# Patient Record
Sex: FEMALE | Race: BLACK OR AFRICAN AMERICAN | NOT HISPANIC OR LATINO | Employment: OTHER | URBAN - METROPOLITAN AREA
[De-identification: names, ages, dates, MRNs, and addresses within clinical notes are randomized per-mention and may not be internally consistent; named-entity substitution may affect disease eponyms.]

---

## 2023-02-23 LAB
ALANINE AMINOTRANSFERASE (SGPT) (U/L) IN SER/PLAS: 8 U/L (ref 7–45)
ALBUMIN (G/DL) IN SER/PLAS: 4 G/DL (ref 3.4–5)
ALKALINE PHOSPHATASE (U/L) IN SER/PLAS: 53 U/L (ref 33–136)
ANION GAP IN SER/PLAS: 14 MMOL/L (ref 10–20)
ASPARTATE AMINOTRANSFERASE (SGOT) (U/L) IN SER/PLAS: 11 U/L (ref 9–39)
BILIRUBIN TOTAL (MG/DL) IN SER/PLAS: 0.5 MG/DL (ref 0–1.2)
CALCIUM (MG/DL) IN SER/PLAS: 9.5 MG/DL (ref 8.6–10.6)
CARBON DIOXIDE, TOTAL (MMOL/L) IN SER/PLAS: 26 MMOL/L (ref 21–32)
CHLORIDE (MMOL/L) IN SER/PLAS: 105 MMOL/L (ref 98–107)
CREATININE (MG/DL) IN SER/PLAS: 0.97 MG/DL (ref 0.5–1.05)
ERYTHROCYTE DISTRIBUTION WIDTH (RATIO) BY AUTOMATED COUNT: 17.2 % (ref 11.5–14.5)
ERYTHROCYTE MEAN CORPUSCULAR HEMOGLOBIN CONCENTRATION (G/DL) BY AUTOMATED: 30.5 G/DL (ref 32–36)
ERYTHROCYTE MEAN CORPUSCULAR VOLUME (FL) BY AUTOMATED COUNT: 95 FL (ref 80–100)
ERYTHROCYTES (10*6/UL) IN BLOOD BY AUTOMATED COUNT: 3.13 X10E12/L (ref 4–5.2)
GFR FEMALE: 57 ML/MIN/1.73M2
GLUCOSE (MG/DL) IN SER/PLAS: 110 MG/DL (ref 74–99)
HEMATOCRIT (%) IN BLOOD BY AUTOMATED COUNT: 29.8 % (ref 36–46)
HEMOGLOBIN (G/DL) IN BLOOD: 9.1 G/DL (ref 12–16)
LEUKOCYTES (10*3/UL) IN BLOOD BY AUTOMATED COUNT: 5.1 X10E9/L (ref 4.4–11.3)
LIPASE (U/L) IN SER/PLAS: 6 U/L (ref 9–82)
NRBC (PER 100 WBCS) BY AUTOMATED COUNT: 0 /100 WBC (ref 0–0)
PLATELETS (10*3/UL) IN BLOOD AUTOMATED COUNT: 218 X10E9/L (ref 150–450)
POTASSIUM (MMOL/L) IN SER/PLAS: 4 MMOL/L (ref 3.5–5.3)
PROTEIN TOTAL: 6.3 G/DL (ref 6.4–8.2)
SODIUM (MMOL/L) IN SER/PLAS: 141 MMOL/L (ref 136–145)
UREA NITROGEN (MG/DL) IN SER/PLAS: 16 MG/DL (ref 6–23)

## 2023-04-20 ENCOUNTER — TELEPHONE (OUTPATIENT)
Dept: PRIMARY CARE | Facility: CLINIC | Age: 88
End: 2023-04-20
Payer: MEDICARE

## 2023-04-24 ENCOUNTER — OFFICE VISIT (OUTPATIENT)
Dept: PRIMARY CARE | Facility: CLINIC | Age: 88
End: 2023-04-24
Payer: MEDICARE

## 2023-04-24 VITALS
HEART RATE: 67 BPM | WEIGHT: 180 LBS | DIASTOLIC BLOOD PRESSURE: 70 MMHG | TEMPERATURE: 97.2 F | BODY MASS INDEX: 28.19 KG/M2 | SYSTOLIC BLOOD PRESSURE: 162 MMHG

## 2023-04-24 DIAGNOSIS — D64.9 ANEMIA, UNSPECIFIED TYPE: ICD-10-CM

## 2023-04-24 DIAGNOSIS — I34.0 MITRAL VALVE INSUFFICIENCY, UNSPECIFIED ETIOLOGY: ICD-10-CM

## 2023-04-24 DIAGNOSIS — R06.02 SOB (SHORTNESS OF BREATH): ICD-10-CM

## 2023-04-24 DIAGNOSIS — R60.0 LEG EDEMA, LEFT: ICD-10-CM

## 2023-04-24 DIAGNOSIS — I10 ESSENTIAL (PRIMARY) HYPERTENSION: ICD-10-CM

## 2023-04-24 DIAGNOSIS — M25.572 ACUTE LEFT ANKLE PAIN: ICD-10-CM

## 2023-04-24 DIAGNOSIS — J81.0 FLASH PULMONARY EDEMA (MULTI): ICD-10-CM

## 2023-04-24 DIAGNOSIS — N17.9 ACUTE KIDNEY INJURY (CMS-HCC): ICD-10-CM

## 2023-04-24 DIAGNOSIS — I35.0 AORTIC VALVE STENOSIS, ETIOLOGY OF CARDIAC VALVE DISEASE UNSPECIFIED: ICD-10-CM

## 2023-04-24 DIAGNOSIS — Z12.11 SCREEN FOR COLON CANCER: Primary | ICD-10-CM

## 2023-04-24 DIAGNOSIS — I07.1 TRICUSPID VALVE INSUFFICIENCY, UNSPECIFIED ETIOLOGY: ICD-10-CM

## 2023-04-24 PROCEDURE — 3077F SYST BP >= 140 MM HG: CPT | Performed by: FAMILY MEDICINE

## 2023-04-24 PROCEDURE — 99214 OFFICE O/P EST MOD 30 MIN: CPT | Performed by: FAMILY MEDICINE

## 2023-04-24 PROCEDURE — 99495 TRANSJ CARE MGMT MOD F2F 14D: CPT | Performed by: FAMILY MEDICINE

## 2023-04-24 PROCEDURE — 1036F TOBACCO NON-USER: CPT | Performed by: FAMILY MEDICINE

## 2023-04-24 PROCEDURE — 1159F MED LIST DOCD IN RCRD: CPT | Performed by: FAMILY MEDICINE

## 2023-04-24 PROCEDURE — 3078F DIAST BP <80 MM HG: CPT | Performed by: FAMILY MEDICINE

## 2023-04-24 RX ORDER — ACETAMINOPHEN 325 MG/1
TABLET ORAL EVERY 6 HOURS
COMMUNITY
Start: 2017-03-31 | End: 2024-01-18 | Stop reason: ALTCHOICE

## 2023-04-24 RX ORDER — BUDESONIDE AND FORMOTEROL FUMARATE DIHYDRATE 80; 4.5 UG/1; UG/1
AEROSOL RESPIRATORY (INHALATION)
COMMUNITY
Start: 2019-08-15 | End: 2023-11-02 | Stop reason: SDUPTHER

## 2023-04-24 RX ORDER — POTASSIUM CHLORIDE 20 MEQ/1
1 TABLET, EXTENDED RELEASE ORAL
COMMUNITY
Start: 2022-07-11 | End: 2023-11-02 | Stop reason: ALTCHOICE

## 2023-04-24 RX ORDER — DULOXETIN HYDROCHLORIDE 30 MG/1
90 CAPSULE, DELAYED RELEASE ORAL
COMMUNITY
End: 2023-06-08 | Stop reason: SDUPTHER

## 2023-04-24 RX ORDER — PREGABALIN 100 MG/1
CAPSULE ORAL
COMMUNITY
End: 2023-11-02 | Stop reason: ALTCHOICE

## 2023-04-24 RX ORDER — AMLODIPINE BESYLATE 10 MG/1
TABLET ORAL
COMMUNITY
Start: 2022-06-19 | End: 2023-11-02 | Stop reason: ALTCHOICE

## 2023-04-24 RX ORDER — SPIRONOLACTONE 25 MG/1
25 TABLET ORAL 2 TIMES DAILY
Qty: 180 TABLET | Refills: 1 | Status: SHIPPED | OUTPATIENT
Start: 2023-04-24 | End: 2023-08-01 | Stop reason: SDUPTHER

## 2023-04-24 RX ORDER — CARVEDILOL 25 MG/1
25 TABLET ORAL
COMMUNITY
Start: 2015-07-21 | End: 2023-06-08 | Stop reason: SDUPTHER

## 2023-04-24 RX ORDER — SPIRONOLACTONE 25 MG/1
25 TABLET ORAL
COMMUNITY
Start: 2022-07-19 | End: 2023-04-24 | Stop reason: SDUPTHER

## 2023-04-24 RX ORDER — SIMVASTATIN 10 MG/1
10 TABLET, FILM COATED ORAL
COMMUNITY
End: 2023-05-09

## 2023-04-24 RX ORDER — OMEPRAZOLE 40 MG/1
40 CAPSULE, DELAYED RELEASE ORAL
COMMUNITY
Start: 2023-02-23 | End: 2023-11-02 | Stop reason: ALTCHOICE

## 2023-04-24 RX ORDER — ALBUTEROL SULFATE 90 UG/1
AEROSOL, METERED RESPIRATORY (INHALATION)
COMMUNITY
End: 2023-04-24 | Stop reason: SDUPTHER

## 2023-04-24 RX ORDER — NAPROXEN SODIUM 220 MG/1
81 TABLET, FILM COATED ORAL
COMMUNITY
Start: 2022-06-23 | End: 2024-02-06 | Stop reason: ALTCHOICE

## 2023-04-24 RX ORDER — PANTOPRAZOLE SODIUM 40 MG/1
1 TABLET, DELAYED RELEASE ORAL DAILY
COMMUNITY
Start: 2016-10-14 | End: 2023-08-21 | Stop reason: SDUPTHER

## 2023-04-24 RX ORDER — ALBUTEROL SULFATE 90 UG/1
2 AEROSOL, METERED RESPIRATORY (INHALATION) EVERY 6 HOURS PRN
Qty: 18 G | Refills: 1 | Status: SHIPPED | OUTPATIENT
Start: 2023-04-24 | End: 2023-07-08

## 2023-04-24 RX ORDER — IBUPROFEN 200 MG
CAPSULE ORAL 2 TIMES DAILY
COMMUNITY
Start: 2022-07-11 | End: 2024-01-18 | Stop reason: WASHOUT

## 2023-04-24 RX ORDER — LOSARTAN POTASSIUM 100 MG/1
1 TABLET ORAL DAILY
COMMUNITY
Start: 2022-07-17 | End: 2023-11-02 | Stop reason: ALTCHOICE

## 2023-04-24 RX ORDER — BLOOD SUGAR DIAGNOSTIC
STRIP MISCELLANEOUS
COMMUNITY

## 2023-04-24 RX ORDER — METFORMIN HYDROCHLORIDE 500 MG/1
500 TABLET ORAL
COMMUNITY
Start: 2016-11-15 | End: 2023-05-26

## 2023-04-24 RX ORDER — EMPAGLIFLOZIN 10 MG/1
TABLET, FILM COATED ORAL
COMMUNITY
Start: 2022-07-19 | End: 2023-11-02 | Stop reason: ALTCHOICE

## 2023-04-24 RX ORDER — LYSINE HCL 500 MG
1 TABLET ORAL 2 TIMES DAILY
COMMUNITY
Start: 2017-11-16

## 2023-04-24 ASSESSMENT — ENCOUNTER SYMPTOMS
LOSS OF SENSATION IN FEET: 1
DEPRESSION: 1
OCCASIONAL FEELINGS OF UNSTEADINESS: 1

## 2023-04-24 NOTE — PROGRESS NOTES
Subjective   Patient ID: Raegan Ramos is a 87 y.o. female who presents for Follow-up.  HPI  The patient is here for  a post hospitalization follow-up visit.     DOA: 4/12/2023  DOD: 4/14/2023.   Diagnosis at admission: Acute SOB and edema of the extremities.  Diagnosis at discharge:   -Flash pulmonary edema in the setting of CHF and recent medication changes that resulted in non adherence.  -Acute hypoxic respiratory failure.  -acute anemia Hb: 9.2, decreased to 7.6.  -Aortic stenosis.  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  She responded well to diuresis and as discharged home on:  -PTA Coreg, Hydralazine, Imdur, Entresto,Spironolactone.  -ASA 81 mg every day   -Repeat TTE recommended in few months. started.  -repeat BMP in a week.    Since she was discharged home, her SOB is not resolved.   A review of system was completed.  All systems were reviewed and were normal, except for the ones that are listed in the HPI.    Objective   Physical Exam  Constitutional:       Appearance: Normal appearance.   HENT:      Head: Normocephalic and atraumatic.      Right Ear: Tympanic membrane, ear canal and external ear normal.      Left Ear: Tympanic membrane, ear canal and external ear normal.      Nose: Nose normal.      Mouth/Throat:      Mouth: Mucous membranes are moist.      Pharynx: Oropharynx is clear.   Eyes:      Extraocular Movements: Extraocular movements intact.      Conjunctiva/sclera: Conjunctivae normal.      Pupils: Pupils are equal, round, and reactive to light.   Cardiovascular:      Rate and Rhythm: Normal rate and regular rhythm.      Pulses: Normal pulses.   Pulmonary:      Effort: Pulmonary effort is normal.      Breath sounds: Normal breath sounds.   Abdominal:      General: Abdomen is flat. Bowel sounds are normal.      Palpations: Abdomen is soft.   Musculoskeletal:         General: Normal range of motion.      Cervical back: Normal range of motion and  neck supple.      Comments: Left calf edema.   Skin:     General: Skin is warm.   Neurological:      General: No focal deficit present.      Mental Status: She is alert and oriented to person, place, and time. Mental status is at baseline.   Psychiatric:         Mood and Affect: Mood normal.         Behavior: Behavior normal.         Thought Content: Thought content normal.         Judgment: Judgment normal.         Assessment/Plan   Problem List Items Addressed This Visit          Respiratory    Flash pulmonary edema (CMS/HCC)    SOB (shortness of breath)    Relevant Medications    albuterol 90 mcg/actuation inhaler       Circulatory    Aortic valve stenosis    Relevant Medications    amLODIPine (Norvasc) 10 mg tablet    carvedilol (Coreg) 25 mg tablet    Tricuspid valve insufficiency     -Consider TTE per hospital discharge recommendation. To discuss with cardiologist.           Relevant Medications    amLODIPine (Norvasc) 10 mg tablet    carvedilol (Coreg) 25 mg tablet    Mitral valve insufficiency    Relevant Medications    amLODIPine (Norvasc) 10 mg tablet    carvedilol (Coreg) 25 mg tablet    Essential (primary) hypertension     - increased spironolactone to 50 mg every day started today.          Relevant Medications    spironolactone (Aldactone) 25 mg tablet    Other Relevant Orders    Comprehensive Metabolic Panel       Genitourinary    Acute kidney injury (CMS/HCC)     -repeat blood test.         Relevant Orders    Comprehensive Metabolic Panel       Musculoskeletal    Acute left ankle pain     -Improving slowly.  Continue to monitor for now.   -Tylenol PRN.         Leg edema, left     -Venous US STAT ordered today.          Relevant Orders    Vascular US lower extremity venous duplex bilateral       Hematologic    Anemia     -Repeat blood test.         Relevant Orders    CBC       Other    Screen for colon cancer - Primary    Relevant Orders    Fecal Occult Blood Immunoassy

## 2023-05-03 ENCOUNTER — TELEMEDICINE (OUTPATIENT)
Dept: PRIMARY CARE | Facility: CLINIC | Age: 88
End: 2023-05-03
Payer: MEDICARE

## 2023-05-03 DIAGNOSIS — R60.0 LEG EDEMA, LEFT: Primary | ICD-10-CM

## 2023-05-03 DIAGNOSIS — R06.02 SOB (SHORTNESS OF BREATH): ICD-10-CM

## 2023-05-03 DIAGNOSIS — I82.432 ACUTE DEEP VEIN THROMBOSIS (DVT) OF POPLITEAL VEIN OF LEFT LOWER EXTREMITY (MULTI): ICD-10-CM

## 2023-05-03 DIAGNOSIS — J81.0 FLASH PULMONARY EDEMA (MULTI): ICD-10-CM

## 2023-05-03 PROCEDURE — 99214 OFFICE O/P EST MOD 30 MIN: CPT | Performed by: FAMILY MEDICINE

## 2023-05-03 RX ORDER — APIXABAN 5 MG (74)
KIT ORAL
Qty: 1 EACH | Refills: 0 | Status: SHIPPED | OUTPATIENT
Start: 2023-05-03 | End: 2023-08-01 | Stop reason: ALTCHOICE

## 2023-05-03 NOTE — PROGRESS NOTES
Patient is due for.  She Subjective   Patient ID: Raegan Ramos is a 87 y.o. female who presents for ER visit follow-up.  HPI  The patient is here for an ER visit follow-up.  She was treated for flash pulmonary edema in a hospital in [place] couple weeks ago.  During her office visit from April 24, 2020 made to discuss his hospitalization discharge instructions, the patient was diagnosed with a left leg edema.  An acute DVT of the left leg was diagnosed the same day, confirmed by a venous Doppler ultrasound of the affected leg.  Jomar same day, patient was recommended to go to the ER for a CT angio of the chest, in order to rule out a pulmonary embolism.  She was discharged home on Eliquis and has been unable to stop her prescription.  She is requesting it to be sent to a different pharmacy.      I had the pleasure the patient A review of system was completed.  All systems were reviewed and were normal, except for the ones that are listed in the HPI.    Objective   Physical Exam    Assessment/Plan   Problem List Items Addressed This Visit          Respiratory    Flash pulmonary edema (CMS/HCC)    Relevant Medications    apixaban (Eliquis DVT-PE Treat 30D Start) 5 mg (74 tabs) tablets,dose pack    Other Relevant Orders    CT angio chest for pulmonary embolism    Referral to Benign Hematology    SOB (shortness of breath)    Relevant Medications    apixaban (Eliquis DVT-PE Treat 30D Start) 5 mg (74 tabs) tablets,dose pack    Other Relevant Orders    CT angio chest for pulmonary embolism    Referral to Benign Hematology       Musculoskeletal    Leg edema, left - Primary    Relevant Medications    apixaban (Eliquis DVT-PE Treat 30D Start) 5 mg (74 tabs) tablets,dose pack    Other Relevant Orders    CT angio chest for pulmonary embolism    Referral to Benign Hematology     Other Visit Diagnoses       Acute deep vein thrombosis (DVT) of popliteal vein of left lower extremity (CMS/HCC)        Relevant Medications     apixaban (Eliquis DVT-PE Treat 30D Start) 5 mg (74 tabs) tablets,dose pack    Other Relevant Orders    CT angio chest for pulmonary embolism    Referral to Benign Hematology

## 2023-05-17 ENCOUNTER — LAB (OUTPATIENT)
Dept: LAB | Facility: LAB | Age: 88
End: 2023-05-17
Payer: MEDICARE

## 2023-05-17 DIAGNOSIS — N17.9 ACUTE KIDNEY INJURY (CMS-HCC): ICD-10-CM

## 2023-05-17 DIAGNOSIS — I10 ESSENTIAL (PRIMARY) HYPERTENSION: ICD-10-CM

## 2023-05-17 DIAGNOSIS — D64.9 ANEMIA, UNSPECIFIED TYPE: ICD-10-CM

## 2023-05-17 LAB
ALANINE AMINOTRANSFERASE (SGPT) (U/L) IN SER/PLAS: 9 U/L (ref 7–45)
ALBUMIN (G/DL) IN SER/PLAS: 3.7 G/DL (ref 3.4–5)
ALBUMIN (G/DL) IN SER/PLAS: 3.8 G/DL (ref 3.4–5)
ALKALINE PHOSPHATASE (U/L) IN SER/PLAS: 50 U/L (ref 33–136)
ANION GAP IN SER/PLAS: 13 MMOL/L (ref 10–20)
ANION GAP IN SER/PLAS: 8 MMOL/L (ref 10–20)
ASPARTATE AMINOTRANSFERASE (SGOT) (U/L) IN SER/PLAS: 11 U/L (ref 9–39)
BILIRUBIN TOTAL (MG/DL) IN SER/PLAS: 0.5 MG/DL (ref 0–1.2)
CALCIUM (MG/DL) IN SER/PLAS: 9.6 MG/DL (ref 8.6–10.6)
CALCIUM (MG/DL) IN SER/PLAS: 9.6 MG/DL (ref 8.6–10.6)
CARBON DIOXIDE, TOTAL (MMOL/L) IN SER/PLAS: 27 MMOL/L (ref 21–32)
CARBON DIOXIDE, TOTAL (MMOL/L) IN SER/PLAS: 31 MMOL/L (ref 21–32)
CHLORIDE (MMOL/L) IN SER/PLAS: 105 MMOL/L (ref 98–107)
CHLORIDE (MMOL/L) IN SER/PLAS: 105 MMOL/L (ref 98–107)
COBALAMIN (VITAMIN B12) (PG/ML) IN SER/PLAS: 747 PG/ML (ref 211–911)
CREATININE (MG/DL) IN SER/PLAS: 1 MG/DL (ref 0.5–1.05)
CREATININE (MG/DL) IN SER/PLAS: 1 MG/DL (ref 0.5–1.05)
ERYTHROCYTE DISTRIBUTION WIDTH (RATIO) BY AUTOMATED COUNT: 16.2 % (ref 11.5–14.5)
ERYTHROCYTE DISTRIBUTION WIDTH (RATIO) BY AUTOMATED COUNT: NORMAL
ERYTHROCYTE MEAN CORPUSCULAR HEMOGLOBIN CONCENTRATION (G/DL) BY AUTOMATED: 30.8 G/DL (ref 32–36)
ERYTHROCYTE MEAN CORPUSCULAR HEMOGLOBIN CONCENTRATION (G/DL) BY AUTOMATED: NORMAL
ERYTHROCYTE MEAN CORPUSCULAR VOLUME (FL) BY AUTOMATED COUNT: 91 FL (ref 80–100)
ERYTHROCYTE MEAN CORPUSCULAR VOLUME (FL) BY AUTOMATED COUNT: NORMAL
ERYTHROCYTES (10*6/UL) IN BLOOD BY AUTOMATED COUNT: 3.29 X10E12/L (ref 4–5.2)
ERYTHROCYTES (10*6/UL) IN BLOOD BY AUTOMATED COUNT: NORMAL
FERRITIN (UG/LL) IN SER/PLAS: 30 UG/L (ref 8–150)
FOLATE (NG/ML) IN SER/PLAS: 13.3 NG/ML
GFR FEMALE: 55 ML/MIN/1.73M2
GFR FEMALE: 55 ML/MIN/1.73M2
GLUCOSE (MG/DL) IN SER/PLAS: 139 MG/DL (ref 74–99)
GLUCOSE (MG/DL) IN SER/PLAS: 142 MG/DL (ref 74–99)
HEMATOCRIT (%) IN BLOOD BY AUTOMATED COUNT: 29.9 % (ref 36–46)
HEMATOCRIT (%) IN BLOOD BY AUTOMATED COUNT: NORMAL
HEMOGLOBIN (G/DL) IN BLOOD: 9.2 G/DL (ref 12–16)
HEMOGLOBIN (G/DL) IN BLOOD: NORMAL
IRON (UG/DL) IN SER/PLAS: 58 UG/DL (ref 35–150)
IRON BINDING CAPACITY (UG/DL) IN SER/PLAS: 373 UG/DL (ref 240–445)
IRON SATURATION (%) IN SER/PLAS: 16 % (ref 25–45)
LEUKOCYTES (10*3/UL) IN BLOOD BY AUTOMATED COUNT: 3.9 X10E9/L (ref 4.4–11.3)
LEUKOCYTES (10*3/UL) IN BLOOD BY AUTOMATED COUNT: NORMAL
NATRIURETIC PEPTIDE B (PG/ML) IN SER/PLAS: 1087 PG/ML (ref 0–99)
NRBC (PER 100 WBCS) BY AUTOMATED COUNT: 0 /100 WBC (ref 0–0)
NRBC (PER 100 WBCS) BY AUTOMATED COUNT: NORMAL
PHOSPHATE (MG/DL) IN SER/PLAS: 3.7 MG/DL (ref 2.5–4.9)
PLATELETS (10*3/UL) IN BLOOD AUTOMATED COUNT: 227 X10E9/L (ref 150–450)
PLATELETS (10*3/UL) IN BLOOD AUTOMATED COUNT: NORMAL
POTASSIUM (MMOL/L) IN SER/PLAS: 4.1 MMOL/L (ref 3.5–5.3)
POTASSIUM (MMOL/L) IN SER/PLAS: 4.1 MMOL/L (ref 3.5–5.3)
PROTEIN TOTAL: 6 G/DL (ref 6.4–8.2)
SODIUM (MMOL/L) IN SER/PLAS: 140 MMOL/L (ref 136–145)
SODIUM (MMOL/L) IN SER/PLAS: 141 MMOL/L (ref 136–145)
THYROTROPIN (MIU/L) IN SER/PLAS BY DETECTION LIMIT <= 0.05 MIU/L: 2.39 MIU/L (ref 0.44–3.98)
UREA NITROGEN (MG/DL) IN SER/PLAS: 19 MG/DL (ref 6–23)
UREA NITROGEN (MG/DL) IN SER/PLAS: 20 MG/DL (ref 6–23)

## 2023-05-17 PROCEDURE — 36415 COLL VENOUS BLD VENIPUNCTURE: CPT

## 2023-05-17 PROCEDURE — 80053 COMPREHEN METABOLIC PANEL: CPT

## 2023-05-17 PROCEDURE — 85027 COMPLETE CBC AUTOMATED: CPT

## 2023-05-22 LAB
ALANINE AMINOTRANSFERASE (SGPT) (U/L) IN SER/PLAS: 6 U/L (ref 7–45)
ALBUMIN (G/DL) IN SER/PLAS: 4 G/DL (ref 3.4–5)
ALKALINE PHOSPHATASE (U/L) IN SER/PLAS: 53 U/L (ref 33–136)
AMYLASE (U/L) IN SER/PLAS: 40 U/L (ref 29–103)
ANION GAP IN SER/PLAS: 11 MMOL/L (ref 10–20)
ASPARTATE AMINOTRANSFERASE (SGOT) (U/L) IN SER/PLAS: 11 U/L (ref 9–39)
BILIRUBIN TOTAL (MG/DL) IN SER/PLAS: 0.5 MG/DL (ref 0–1.2)
CALCIUM (MG/DL) IN SER/PLAS: 10.1 MG/DL (ref 8.6–10.6)
CARBON DIOXIDE, TOTAL (MMOL/L) IN SER/PLAS: 29 MMOL/L (ref 21–32)
CHLORIDE (MMOL/L) IN SER/PLAS: 106 MMOL/L (ref 98–107)
CREATININE (MG/DL) IN SER/PLAS: 1.13 MG/DL (ref 0.5–1.05)
ERYTHROCYTE DISTRIBUTION WIDTH (RATIO) BY AUTOMATED COUNT: 16.8 % (ref 11.5–14.5)
ERYTHROCYTE MEAN CORPUSCULAR HEMOGLOBIN CONCENTRATION (G/DL) BY AUTOMATED: 30.2 G/DL (ref 32–36)
ERYTHROCYTE MEAN CORPUSCULAR VOLUME (FL) BY AUTOMATED COUNT: 93 FL (ref 80–100)
ERYTHROCYTES (10*6/UL) IN BLOOD BY AUTOMATED COUNT: 3.33 X10E12/L (ref 4–5.2)
GFR FEMALE: 48 ML/MIN/1.73M2
GLUCOSE (MG/DL) IN SER/PLAS: 143 MG/DL (ref 74–99)
HEMATOCRIT (%) IN BLOOD BY AUTOMATED COUNT: 31.1 % (ref 36–46)
HEMOGLOBIN (G/DL) IN BLOOD: 9.4 G/DL (ref 12–16)
LEUKOCYTES (10*3/UL) IN BLOOD BY AUTOMATED COUNT: 5 X10E9/L (ref 4.4–11.3)
LIPASE (U/L) IN SER/PLAS: 9 U/L (ref 9–82)
NATRIURETIC PEPTIDE B (PG/ML) IN SER/PLAS: 1355 PG/ML (ref 0–99)
NRBC (PER 100 WBCS) BY AUTOMATED COUNT: 0 /100 WBC (ref 0–0)
PLATELETS (10*3/UL) IN BLOOD AUTOMATED COUNT: 226 X10E9/L (ref 150–450)
POTASSIUM (MMOL/L) IN SER/PLAS: 4.4 MMOL/L (ref 3.5–5.3)
PROTEIN TOTAL: 6.5 G/DL (ref 6.4–8.2)
SODIUM (MMOL/L) IN SER/PLAS: 142 MMOL/L (ref 136–145)
THYROTROPIN (MIU/L) IN SER/PLAS BY DETECTION LIMIT <= 0.05 MIU/L: 3.24 MIU/L (ref 0.44–3.98)
UREA NITROGEN (MG/DL) IN SER/PLAS: 21 MG/DL (ref 6–23)

## 2023-05-24 ENCOUNTER — APPOINTMENT (OUTPATIENT)
Dept: PRIMARY CARE | Facility: CLINIC | Age: 88
End: 2023-05-24
Payer: MEDICARE

## 2023-06-02 ENCOUNTER — TELEMEDICINE (OUTPATIENT)
Dept: PRIMARY CARE | Facility: CLINIC | Age: 88
End: 2023-06-02
Payer: MEDICARE

## 2023-06-02 DIAGNOSIS — R63.4 ABNORMAL WEIGHT LOSS: Primary | ICD-10-CM

## 2023-06-02 NOTE — PROGRESS NOTES
Change from 1 to when morning today okay let me look to see because today is June 2 Subjective   Patient ID: Raegan Ramos is a 87 y.o. female who presents for No chief complaint on file..  HPI    The patient was NOT SEEN TODAY.  She stated that she was reschedule for June 8 at 8:45 AM.      A review of system was completed.  All systems were reviewed and were normal, except for the ones that are listed in the HPI.    Objective   Physical Exam    Assessment/Plan   Problem List Items Addressed This Visit    None

## 2023-06-04 LAB — FECAL OCCULT BLD IMMUNOASSAY: NEGATIVE

## 2023-06-06 ENCOUNTER — TELEPHONE (OUTPATIENT)
Dept: PRIMARY CARE | Facility: CLINIC | Age: 88
End: 2023-06-06
Payer: MEDICARE

## 2023-06-06 DIAGNOSIS — R06.02 SOB (SHORTNESS OF BREATH): Primary | ICD-10-CM

## 2023-06-06 NOTE — TELEPHONE ENCOUNTER
----- Message from An Jones MD sent at 6/4/2023 10:27 AM EDT -----  Patient's blood test showed signs of an anemia that has slightly improved.  Her white cell count is decreased.  We recommend a referral to a hematologist or blood specialist.  Referral order was already placed in the system.  Please notify the patient if she has not seen one yet.

## 2023-06-06 NOTE — TELEPHONE ENCOUNTER
----- Message from An Jones MD sent at 6/5/2023  4:51 AM EDT -----  Patient CT scan of the chest showed signs of low blood counts.  The heart size was slightly increased with some signs of mild water buildup.  The patient should discuss her results with the heart doctor since she is already seeing.  She should make sure to take her heart pills as prescribed.

## 2023-06-08 ENCOUNTER — OFFICE VISIT (OUTPATIENT)
Dept: PRIMARY CARE | Facility: CLINIC | Age: 88
End: 2023-06-08
Payer: MEDICARE

## 2023-06-08 VITALS
TEMPERATURE: 97.3 F | WEIGHT: 164.4 LBS | HEART RATE: 60 BPM | BODY MASS INDEX: 25.75 KG/M2 | SYSTOLIC BLOOD PRESSURE: 179 MMHG | DIASTOLIC BLOOD PRESSURE: 68 MMHG

## 2023-06-08 DIAGNOSIS — M54.40 CHRONIC LOW BACK PAIN WITH SCIATICA, SCIATICA LATERALITY UNSPECIFIED, UNSPECIFIED BACK PAIN LATERALITY: ICD-10-CM

## 2023-06-08 DIAGNOSIS — R06.02 SOB (SHORTNESS OF BREATH): ICD-10-CM

## 2023-06-08 DIAGNOSIS — R63.4 ABNORMAL WEIGHT LOSS: Primary | ICD-10-CM

## 2023-06-08 DIAGNOSIS — D64.9 ANEMIA, UNSPECIFIED TYPE: ICD-10-CM

## 2023-06-08 DIAGNOSIS — I82.432 ACUTE DEEP VEIN THROMBOSIS (DVT) OF POPLITEAL VEIN OF LEFT LOWER EXTREMITY (MULTI): ICD-10-CM

## 2023-06-08 DIAGNOSIS — G89.29 CHRONIC LOW BACK PAIN WITH SCIATICA, SCIATICA LATERALITY UNSPECIFIED, UNSPECIFIED BACK PAIN LATERALITY: ICD-10-CM

## 2023-06-08 DIAGNOSIS — I50.9 CHRONIC CONGESTIVE HEART FAILURE, UNSPECIFIED HEART FAILURE TYPE (MULTI): ICD-10-CM

## 2023-06-08 DIAGNOSIS — R60.0 LEG EDEMA, LEFT: ICD-10-CM

## 2023-06-08 DIAGNOSIS — J81.0 FLASH PULMONARY EDEMA (MULTI): ICD-10-CM

## 2023-06-08 DIAGNOSIS — Z12.31 VISIT FOR SCREENING MAMMOGRAM: ICD-10-CM

## 2023-06-08 PROCEDURE — 1036F TOBACCO NON-USER: CPT | Performed by: FAMILY MEDICINE

## 2023-06-08 PROCEDURE — 3078F DIAST BP <80 MM HG: CPT | Performed by: FAMILY MEDICINE

## 2023-06-08 PROCEDURE — 3077F SYST BP >= 140 MM HG: CPT | Performed by: FAMILY MEDICINE

## 2023-06-08 PROCEDURE — 1159F MED LIST DOCD IN RCRD: CPT | Performed by: FAMILY MEDICINE

## 2023-06-08 PROCEDURE — 1125F AMNT PAIN NOTED PAIN PRSNT: CPT | Performed by: FAMILY MEDICINE

## 2023-06-08 PROCEDURE — 99215 OFFICE O/P EST HI 40 MIN: CPT | Performed by: FAMILY MEDICINE

## 2023-06-08 RX ORDER — DULOXETIN HYDROCHLORIDE 30 MG/1
90 CAPSULE, DELAYED RELEASE ORAL DAILY
Qty: 270 CAPSULE | Refills: 1 | Status: SHIPPED | OUTPATIENT
Start: 2023-06-08 | End: 2023-10-29

## 2023-06-08 RX ORDER — CARVEDILOL 25 MG/1
25 TABLET ORAL
Qty: 180 TABLET | Refills: 1 | Status: SHIPPED | OUTPATIENT
Start: 2023-06-08 | End: 2023-08-19

## 2023-06-08 NOTE — ASSESSMENT & PLAN NOTE
-Iron supplement 325 mg every day started today.  -follow-up with hematologist.  -GI follow-up for EGD and colonoscopy if indicated with her age.  - screening mammogram ordered.  -CT abdo/pelvis ordered.   -last FIT 5/2023: negative.    **  -CT chest non contributory 5/2023.  -EGD: chemical gastritis 2021.  -colonoscopy 2017: wnl.  -mammogram : never since 2016. Declined.

## 2023-06-08 NOTE — PROGRESS NOTES
Subjective   Patient ID: Raegan Ramos is a 87 y.o. female who presents for Follow-up.  HPI    The patient is here for:  1-  a post hospitalization follow-up visit for her newly diagnosed left calf DVT.  She is due for a refill of her Eliquis.  2- Abnormal weight loss for 20 lbs over the past year.  She has a chronic anemia that worsen in February of 2023.      A review of system was completed.  All systems were reviewed and were normal, except for the ones that are listed in the HPI.    Objective   Physical Exam  Constitutional:       Appearance: Normal appearance.   HENT:      Head: Normocephalic and atraumatic.      Right Ear: Tympanic membrane, ear canal and external ear normal.      Left Ear: Tympanic membrane, ear canal and external ear normal.      Nose: Nose normal.      Mouth/Throat:      Mouth: Mucous membranes are moist.      Pharynx: Oropharynx is clear.   Eyes:      Extraocular Movements: Extraocular movements intact.      Conjunctiva/sclera: Conjunctivae normal.      Pupils: Pupils are equal, round, and reactive to light.   Cardiovascular:      Rate and Rhythm: Normal rate and regular rhythm.      Pulses: Normal pulses.   Pulmonary:      Effort: Pulmonary effort is normal.      Breath sounds: Normal breath sounds.   Abdominal:      General: Abdomen is flat. Bowel sounds are normal.      Palpations: Abdomen is soft.   Musculoskeletal:         General: Normal range of motion.      Cervical back: Normal range of motion and neck supple.   Skin:     General: Skin is warm.   Neurological:      General: No focal deficit present.      Mental Status: She is alert and oriented to person, place, and time. Mental status is at baseline.   Psychiatric:         Mood and Affect: Mood normal.         Behavior: Behavior normal.         Thought Content: Thought content normal.         Judgment: Judgment normal.         Assessment/Plan   Problem List Items Addressed This Visit          Respiratory    Flash pulmonary  edema (CMS/HCC)    SOB (shortness of breath)       Circulatory    Acute deep vein thrombosis (DVT) of popliteal vein of left lower extremity (CMS/HCC)    Relevant Medications    apixaban (Eliquis) 5 mg tablet       Musculoskeletal    Leg edema, left       Endocrine/Metabolic    Abnormal weight loss - Primary     -Iron supplement 325 mg every day started today.  -follow-up with hematologist.  -GI follow-up for EGD and colonoscopy if indicated with her age.  -last FIT 5/2023: negative.           Relevant Orders    CT abdomen pelvis w IV contrast       Hematologic    Anemia     -Iron supplement 325 mg every day started today.  -follow-up with hematologist.  -GI follow-up for EGD and colonoscopy if indicated with her age.  -last FIT 5/2023: negative.            Other    Visit for screening mammogram    Relevant Orders    BI mammo bilateral screening tomosynthesis     Other Visit Diagnoses       Chronic low back pain with sciatica, sciatica laterality unspecified, unspecified back pain laterality        Relevant Medications    DULoxetine (Cymbalta) 30 mg DR capsule    Chronic congestive heart failure, unspecified heart failure type (CMS/HCC)        Relevant Medications    carvedilol (Coreg) 25 mg tablet

## 2023-06-08 NOTE — ASSESSMENT & PLAN NOTE
-Iron supplement 325 mg every day started today.  -follow-up with hematologist.  -GI follow-up for EGD and colonoscopy if indicated with her age.  -last FIT 5/2023: negative.

## 2023-06-30 ENCOUNTER — LAB (OUTPATIENT)
Dept: LAB | Facility: LAB | Age: 88
End: 2023-06-30
Payer: MEDICARE

## 2023-06-30 DIAGNOSIS — R06.02 SOB (SHORTNESS OF BREATH): ICD-10-CM

## 2023-06-30 LAB
ANION GAP IN SER/PLAS: 12 MMOL/L (ref 10–20)
CALCIUM (MG/DL) IN SER/PLAS: 9.5 MG/DL (ref 8.6–10.6)
CARBON DIOXIDE, TOTAL (MMOL/L) IN SER/PLAS: 26 MMOL/L (ref 21–32)
CHLORIDE (MMOL/L) IN SER/PLAS: 108 MMOL/L (ref 98–107)
CREATININE (MG/DL) IN SER/PLAS: 1.22 MG/DL (ref 0.5–1.05)
GFR FEMALE: 43 ML/MIN/1.73M2
GLUCOSE (MG/DL) IN SER/PLAS: 123 MG/DL (ref 74–99)
POTASSIUM (MMOL/L) IN SER/PLAS: 4.2 MMOL/L (ref 3.5–5.3)
SODIUM (MMOL/L) IN SER/PLAS: 142 MMOL/L (ref 136–145)
UREA NITROGEN (MG/DL) IN SER/PLAS: 24 MG/DL (ref 6–23)

## 2023-06-30 PROCEDURE — 36415 COLL VENOUS BLD VENIPUNCTURE: CPT

## 2023-06-30 PROCEDURE — 80048 BASIC METABOLIC PNL TOTAL CA: CPT

## 2023-07-30 DIAGNOSIS — E11.9 DIABETES MELLITUS TYPE 2 IN NONOBESE (MULTI): ICD-10-CM

## 2023-08-01 DIAGNOSIS — I82.432 ACUTE DEEP VEIN THROMBOSIS (DVT) OF POPLITEAL VEIN OF LEFT LOWER EXTREMITY (MULTI): ICD-10-CM

## 2023-08-01 DIAGNOSIS — E11.9 DIABETES MELLITUS TYPE 2 IN NONOBESE (MULTI): ICD-10-CM

## 2023-08-01 DIAGNOSIS — I10 ESSENTIAL (PRIMARY) HYPERTENSION: ICD-10-CM

## 2023-08-01 RX ORDER — SPIRONOLACTONE 25 MG/1
25 TABLET ORAL DAILY
Qty: 90 TABLET | Refills: 0 | Status: SHIPPED | OUTPATIENT
Start: 2023-08-01 | End: 2023-08-21 | Stop reason: ALTCHOICE

## 2023-08-01 RX ORDER — SPIRONOLACTONE 25 MG/1
25 TABLET ORAL 2 TIMES DAILY
Qty: 180 TABLET | Refills: 0 | Status: SHIPPED | OUTPATIENT
Start: 2023-08-01 | End: 2023-08-21 | Stop reason: SDUPTHER

## 2023-08-01 RX ORDER — METFORMIN HYDROCHLORIDE 500 MG/1
TABLET ORAL
Qty: 180 TABLET | Refills: 1 | Status: SHIPPED | OUTPATIENT
Start: 2023-08-01 | End: 2023-08-01 | Stop reason: SDUPTHER

## 2023-08-01 RX ORDER — METFORMIN HYDROCHLORIDE 500 MG/1
500 TABLET ORAL 2 TIMES DAILY
Qty: 180 TABLET | Refills: 1 | Status: SHIPPED | OUTPATIENT
Start: 2023-08-01 | End: 2023-08-21 | Stop reason: SDUPTHER

## 2023-08-08 ENCOUNTER — PATIENT OUTREACH (OUTPATIENT)
Dept: CARE COORDINATION | Facility: CLINIC | Age: 88
End: 2023-08-08
Payer: MEDICARE

## 2023-08-08 NOTE — PROGRESS NOTES
Discharge Facility:[facility]  Discharge Diagnosis: chest pain  Admission Date:08/05/23  Discharge Date: 08/07/23    PCP Appointment Date:08/21/23  Specialist Appointment Date:   Hospital Encounter and Summary: Linked  See discharge assessment below for further detals  Engagement  Call Start Time: 0854 (8/8/2023  8:54 AM)    Medications  Medications reviewed with patient/caregiver?: Yes (no new meds) (8/8/2023  8:54 AM)  Is the patient having any side effects they believe may be caused by any medication additions or changes?: No (8/8/2023  8:54 AM)  Does the patient have all medications ordered at discharge?: Not applicable (8/8/2023  8:54 AM)  Is the patient taking all medications as directed (includes completed medication regime)?: Yes (8/8/2023  8:54 AM)    Appointments  Does the patient have a primary care provider?: Yes (8/8/2023  8:54 AM)    Self Management  Has home health visited the patient within 72 hours of discharge?: Not applicable (8/8/2023  8:54 AM)  Has all Durable Medical Equipment (DME) been delivered?: No (8/8/2023  8:54 AM)    Patient Teaching  Does the patient have access to their discharge instructions?: Yes (8/8/2023  8:54 AM)  Care Management Interventions: Reviewed instructions with patient (8/8/2023  8:54 AM)  What is the patient's perception of their health status since discharge?: Improving (8/8/2023  8:54 AM)    Wrap Up  Call End Time: 0900 (8/8/2023  8:54 AM)

## 2023-08-17 DIAGNOSIS — I11.0 HYPERTENSIVE HEART DISEASE WITH HEART FAILURE (MULTI): Primary | ICD-10-CM

## 2023-08-21 ENCOUNTER — OFFICE VISIT (OUTPATIENT)
Dept: PRIMARY CARE | Facility: CLINIC | Age: 88
End: 2023-08-21
Payer: MEDICARE

## 2023-08-21 VITALS
TEMPERATURE: 98 F | SYSTOLIC BLOOD PRESSURE: 123 MMHG | WEIGHT: 158.6 LBS | HEART RATE: 100 BPM | DIASTOLIC BLOOD PRESSURE: 51 MMHG | BODY MASS INDEX: 24.84 KG/M2

## 2023-08-21 DIAGNOSIS — R07.9 CHEST PAIN, UNSPECIFIED TYPE: ICD-10-CM

## 2023-08-21 DIAGNOSIS — I10 ESSENTIAL (PRIMARY) HYPERTENSION: ICD-10-CM

## 2023-08-21 DIAGNOSIS — I48.20 CHRONIC ATRIAL FIBRILLATION (MULTI): ICD-10-CM

## 2023-08-21 DIAGNOSIS — K21.9 GASTROESOPHAGEAL REFLUX DISEASE WITHOUT ESOPHAGITIS: ICD-10-CM

## 2023-08-21 DIAGNOSIS — C50.912: ICD-10-CM

## 2023-08-21 DIAGNOSIS — Z09 HOSPITAL DISCHARGE FOLLOW-UP: Primary | ICD-10-CM

## 2023-08-21 DIAGNOSIS — E11.9 DIABETES MELLITUS TYPE 2 IN NONOBESE (MULTI): ICD-10-CM

## 2023-08-21 DIAGNOSIS — N20.0 LEFT NEPHROLITHIASIS: ICD-10-CM

## 2023-08-21 DIAGNOSIS — R63.4 ABNORMAL WEIGHT LOSS: ICD-10-CM

## 2023-08-21 DIAGNOSIS — I82.432 ACUTE DEEP VEIN THROMBOSIS (DVT) OF POPLITEAL VEIN OF LEFT LOWER EXTREMITY (MULTI): ICD-10-CM

## 2023-08-21 DIAGNOSIS — N94.9 ADNEXAL CYST: ICD-10-CM

## 2023-08-21 PROCEDURE — 99495 TRANSJ CARE MGMT MOD F2F 14D: CPT | Performed by: FAMILY MEDICINE

## 2023-08-21 PROCEDURE — 1036F TOBACCO NON-USER: CPT | Performed by: FAMILY MEDICINE

## 2023-08-21 PROCEDURE — 3078F DIAST BP <80 MM HG: CPT | Performed by: FAMILY MEDICINE

## 2023-08-21 PROCEDURE — 3074F SYST BP LT 130 MM HG: CPT | Performed by: FAMILY MEDICINE

## 2023-08-21 PROCEDURE — 1125F AMNT PAIN NOTED PAIN PRSNT: CPT | Performed by: FAMILY MEDICINE

## 2023-08-21 PROCEDURE — 1159F MED LIST DOCD IN RCRD: CPT | Performed by: FAMILY MEDICINE

## 2023-08-21 RX ORDER — SPIRONOLACTONE 25 MG/1
25 TABLET ORAL 2 TIMES DAILY
Qty: 180 TABLET | Refills: 1 | Status: SHIPPED | OUTPATIENT
Start: 2023-08-21 | End: 2023-11-10

## 2023-08-21 RX ORDER — PANTOPRAZOLE SODIUM 40 MG/1
40 TABLET, DELAYED RELEASE ORAL DAILY
Qty: 90 TABLET | Refills: 1 | Status: SHIPPED | OUTPATIENT
Start: 2023-08-21 | End: 2023-11-10

## 2023-08-21 RX ORDER — METFORMIN HYDROCHLORIDE 500 MG/1
500 TABLET ORAL 2 TIMES DAILY
Qty: 180 TABLET | Refills: 1 | Status: SHIPPED | OUTPATIENT
Start: 2023-08-21 | End: 2023-10-29

## 2023-08-21 NOTE — ASSESSMENT & PLAN NOTE
-secondary to her right-sided invasive ductal carcinoma, clinical stage IA (cT1c cN0 M0). The patient's breast cancer was diagnosed on July 7, 2023, and is estrogen receptor positive at >95%. Medically managed- no surgery.

## 2023-08-21 NOTE — ASSESSMENT & PLAN NOTE
-known CAD and abnormal CT angio coronary arteries.  -follow-up with cardiologist as scheduled.  -Cont ASA and statin.

## 2023-08-21 NOTE — ASSESSMENT & PLAN NOTE
-right-sided invasive ductal carcinoma, clinical stage IA (cT1c cN0 M0). The patient's breast cancer was diagnosed on July 7, 2023, and is estrogen receptor positive at >95%. Medically managed- no surgery.

## 2023-08-21 NOTE — PROGRESS NOTES
Subjective   Patient ID: Raegan Ramos is a 87 y.o. female who presents for Hospital Follow-up.  HPI  The patient is here today for:    1- [place] [facility] [place]  She went in for a chest pain workup.  She was assessed for:    *Chest pain, concern for angina   Elevated trop   HFpEF (EF 45-50%)   -euvolemic on exam. significant TTP over left chest area of concern   -HEART score 7/8   -EKG with nonspecific repolarization abnormalities, appears similar to   previous, not clearly ischemic   -trop 17, 16, 16   -, improved from previous   -continuous cardiac monitoring   -continue asa statin   -nitro PRN   -trial lidocaine patch over are of concern   -repeat EKG   -trend trop stable, 17->16-> 16   -cards consult appreciated: CTA coronaries today   -f/u with Dr. Garcia in 2-3 weeks       *A.fib on eliquis   -in NSR at this time   -continue home eliquis   -telemetry       *MATEUSZ   -creatinine trend reviewed, appears labile. Has been 1.4 -> 1.1   -bolus 250 cc's 1/hour with caution iso of HF   -stop nephrotoxic meds   -trend BMP       *HTN   -continue carvedilol, hydralazine, entresto , spironlactone       2-weight loss follow-up visit.; She was diagnosed with a right-sided invasive ductal carcinoma, clinical stage IA (cT1c cN0 M0). The patient's breast cancer was diagnosed on July 7, 2023, and is estrogen receptor positive at >95%    3-  test result follow-up visit.  -CT angio coronary test result.  -CT abdo/ pelvis result: left nephrolithiasis and right adnexal cyst or mass for which short term monitoring is recommended.     A review of system was completed.  All systems were reviewed and were normal, except for the ones that are listed in the HPI.    Objective   Physical Exam  Constitutional:       Appearance: Normal appearance.   HENT:      Head: Normocephalic and atraumatic.      Right Ear: Tympanic membrane, ear canal and external ear normal.      Left Ear: Tympanic  membrane, ear canal and external ear normal.      Nose: Nose normal.      Mouth/Throat:      Mouth: Mucous membranes are moist.      Pharynx: Oropharynx is clear.   Eyes:      Extraocular Movements: Extraocular movements intact.      Conjunctiva/sclera: Conjunctivae normal.      Pupils: Pupils are equal, round, and reactive to light.   Cardiovascular:      Rate and Rhythm: Normal rate and regular rhythm.      Pulses: Normal pulses.   Pulmonary:      Effort: Pulmonary effort is normal.      Breath sounds: Normal breath sounds.   Abdominal:      General: Abdomen is flat. Bowel sounds are normal.      Palpations: Abdomen is soft.   Musculoskeletal:         General: Normal range of motion.      Cervical back: Normal range of motion and neck supple.   Skin:     General: Skin is warm.   Neurological:      General: No focal deficit present.      Mental Status: She is alert and oriented to person, place, and time. Mental status is at baseline.   Psychiatric:         Mood and Affect: Mood normal.         Behavior: Behavior normal.         Thought Content: Thought content normal.         Judgment: Judgment normal.         Assessment/Plan   Problem List Items Addressed This Visit       Essential (primary) hypertension     -continue carvedilol, hydralazine, entresto , spironlactone.         Relevant Medications    spironolactone (Aldactone) 25 mg tablet    Acute deep vein thrombosis (DVT) of popliteal vein of left lower extremity (CMS/HCC)    Relevant Medications    apixaban (Eliquis) 5 mg tablet    Abnormal weight loss     -secondary to her right-sided invasive ductal carcinoma, clinical stage IA (cT1c cN0 M0). The patient's breast cancer was diagnosed on July 7, 2023, and is estrogen receptor positive at >95%. Medically managed- no surgery.         Hospital discharge follow-up - Primary    Chest pain     -known CAD and abnormal CT angio coronary arteries.  -follow-up with cardiologist as scheduled.  -Cont ASA and statin.          Adnexal cyst     -GYN referral done today for close monitoring.         Relevant Orders    Referral to Gynecology    Left nephrolithiasis     -non obstructing.         Invasive ductal carcinoma of breast, stage 1, left (CMS/HCC)     -right-sided invasive ductal carcinoma, clinical stage IA (cT1c cN0 M0). The patient's breast cancer was diagnosed on July 7, 2023, and is estrogen receptor positive at >95%. Medically managed- no surgery.         Chronic atrial fibrillation (CMS/HCC)     -in NSR at this time   -continue home eliquis          Diabetes mellitus type 2 in nonobese (CMS/HCC)    Relevant Medications    metFORMIN (Glucophage) 500 mg tablet    Gastroesophageal reflux disease without esophagitis    Relevant Medications    pantoprazole (ProtoNix) 40 mg EC tablet    Patient to return to office 3 months.

## 2023-08-22 ENCOUNTER — PATIENT OUTREACH (OUTPATIENT)
Dept: CARE COORDINATION | Facility: CLINIC | Age: 88
End: 2023-08-22
Payer: MEDICARE

## 2023-08-22 NOTE — PROGRESS NOTES
Unable to reach patient for call back after patient's follow up appointment with PCP.   DANYM with call back number for patient to call if needed   If no voicemail available call attempts x 2 were made to contact the patient to assist with any questions or concerns patient may have.

## 2023-09-05 ENCOUNTER — PATIENT OUTREACH (OUTPATIENT)
Dept: CARE COORDINATION | Facility: CLINIC | Age: 88
End: 2023-09-05
Payer: MEDICARE

## 2023-09-05 NOTE — PROGRESS NOTES
Call placed regarding one month post discharge follow up call.  At time of outreach call the patient feels as if their condition has (improved since initial visit with PCP or specialist.  Questions or concerns regarding recovery period addressed at this time. (Individualize as needed if questions arise)  Reviewed any PCP or specialists progress notes/labs/radiology reports if applicable and addressed any questions or concerns.

## 2023-09-14 ENCOUNTER — APPOINTMENT (OUTPATIENT)
Dept: PRIMARY CARE | Facility: CLINIC | Age: 88
End: 2023-09-14
Payer: MEDICARE

## 2023-09-29 DIAGNOSIS — N18.9 ANEMIA OF RENAL DISEASE: Primary | ICD-10-CM

## 2023-09-29 DIAGNOSIS — D63.1 ANEMIA OF RENAL DISEASE: Primary | ICD-10-CM

## 2023-09-29 DIAGNOSIS — D50.0 IRON DEFICIENCY ANEMIA DUE TO CHRONIC BLOOD LOSS: ICD-10-CM

## 2023-09-29 DIAGNOSIS — I50.32 CHRONIC DIASTOLIC CONGESTIVE HEART FAILURE (MULTI): ICD-10-CM

## 2023-09-29 RX ORDER — ALBUTEROL SULFATE 0.83 MG/ML
3 SOLUTION RESPIRATORY (INHALATION) AS NEEDED
Status: CANCELLED | OUTPATIENT
Start: 2023-10-12

## 2023-09-29 RX ORDER — FAMOTIDINE 10 MG/ML
20 INJECTION INTRAVENOUS ONCE AS NEEDED
Status: CANCELLED | OUTPATIENT
Start: 2023-10-12

## 2023-09-29 RX ORDER — ACETAMINOPHEN 325 MG/1
650 TABLET ORAL ONCE
Status: CANCELLED
Start: 2023-10-12 | End: 2023-10-04

## 2023-09-29 RX ORDER — DIPHENHYDRAMINE HYDROCHLORIDE 50 MG/ML
50 INJECTION INTRAMUSCULAR; INTRAVENOUS AS NEEDED
Status: CANCELLED | OUTPATIENT
Start: 2023-10-12

## 2023-09-29 RX ORDER — METHYLPREDNISOLONE SODIUM SUCCINATE 40 MG/ML
40 INJECTION INTRAMUSCULAR; INTRAVENOUS AS NEEDED
Status: CANCELLED | OUTPATIENT
Start: 2023-10-12

## 2023-09-29 RX ORDER — EPINEPHRINE 0.3 MG/.3ML
0.3 INJECTION SUBCUTANEOUS EVERY 5 MIN PRN
Status: CANCELLED | OUTPATIENT
Start: 2023-10-12

## 2023-09-29 RX ORDER — HEPARIN 100 UNIT/ML
500 SYRINGE INTRAVENOUS AS NEEDED
Status: CANCELLED | OUTPATIENT
Start: 2023-10-04

## 2023-09-29 RX ORDER — HEPARIN SODIUM,PORCINE/PF 10 UNIT/ML
50 SYRINGE (ML) INTRAVENOUS AS NEEDED
Status: CANCELLED | OUTPATIENT
Start: 2023-10-04

## 2023-10-02 DIAGNOSIS — D50.0 IRON DEFICIENCY ANEMIA DUE TO CHRONIC BLOOD LOSS: Primary | ICD-10-CM

## 2023-10-03 DIAGNOSIS — D50.0 IRON DEFICIENCY ANEMIA DUE TO CHRONIC BLOOD LOSS: Primary | ICD-10-CM

## 2023-10-11 PROBLEM — D64.9 CHRONIC ANEMIA: Status: ACTIVE | Noted: 2023-10-11

## 2023-10-11 PROBLEM — R92.8 ABNORMAL MAMMOGRAM: Status: ACTIVE | Noted: 2023-10-11

## 2023-10-11 PROBLEM — E61.1 IRON DEFICIENCY: Status: ACTIVE | Noted: 2023-10-11

## 2023-10-11 PROBLEM — M19.90 ARTHRITIS: Status: ACTIVE | Noted: 2023-10-11

## 2023-10-11 PROBLEM — F32.A DEPRESSION: Status: ACTIVE | Noted: 2023-10-11

## 2023-10-11 PROBLEM — I50.9 CONGESTIVE HEART FAILURE (MULTI): Status: ACTIVE | Noted: 2023-10-11

## 2023-10-11 PROBLEM — Z96.89 S/P INSERTION OF SPINAL CORD STIMULATOR: Status: ACTIVE | Noted: 2023-10-11

## 2023-10-11 PROBLEM — R35.0 INCREASED FREQUENCY OF URINATION: Status: ACTIVE | Noted: 2023-10-11

## 2023-10-11 PROBLEM — H43.813 PVD (POSTERIOR VITREOUS DETACHMENT), BOTH EYES: Status: ACTIVE | Noted: 2023-10-11

## 2023-10-11 PROBLEM — M85.80 OSTEOPENIA: Status: ACTIVE | Noted: 2023-10-11

## 2023-10-11 PROBLEM — G89.29 CHRONIC HEADACHES: Status: ACTIVE | Noted: 2023-10-11

## 2023-10-11 PROBLEM — H81.10 BENIGN PAROXYSMAL POSITIONAL VERTIGO: Status: ACTIVE | Noted: 2023-10-11

## 2023-10-11 PROBLEM — R20.2 PARESTHESIA OF BOTH FEET: Status: ACTIVE | Noted: 2023-10-11

## 2023-10-11 PROBLEM — R92.30 DENSE BREAST TISSUE: Status: ACTIVE | Noted: 2023-10-11

## 2023-10-11 PROBLEM — H52.01 HYPERMETROPIA OF RIGHT EYE: Status: ACTIVE | Noted: 2023-10-11

## 2023-10-11 PROBLEM — E78.2 MIXED HYPERLIPIDEMIA: Status: ACTIVE | Noted: 2023-10-11

## 2023-10-11 PROBLEM — H25.813 COMBINED FORM OF AGE-RELATED CATARACT, BOTH EYES: Status: ACTIVE | Noted: 2023-10-11

## 2023-10-11 PROBLEM — R94.31 ABNORMAL ECG: Status: ACTIVE | Noted: 2023-10-11

## 2023-10-11 PROBLEM — G56.01 CARPAL TUNNEL SYNDROME OF RIGHT WRIST: Status: ACTIVE | Noted: 2023-10-11

## 2023-10-11 PROBLEM — M96.1 POSTLAMINECTOMY SYNDROME OF LUMBAR REGION: Status: ACTIVE | Noted: 2023-10-11

## 2023-10-11 PROBLEM — C50.919 BREAST CANCER (MULTI): Status: ACTIVE | Noted: 2023-10-11

## 2023-10-11 PROBLEM — K11.8 PAROTID MASS: Status: ACTIVE | Noted: 2023-10-11

## 2023-10-11 PROBLEM — R07.89 ATYPICAL CHEST PAIN: Status: ACTIVE | Noted: 2023-10-11

## 2023-10-11 PROBLEM — R13.10 DYSPHAGIA: Status: ACTIVE | Noted: 2023-10-11

## 2023-10-11 PROBLEM — R01.1 HEART MURMUR: Status: ACTIVE | Noted: 2023-10-11

## 2023-10-11 PROBLEM — F45.42 PAIN DISORDER ASSOCIATED WITH PSYCHOLOGICAL AND PHYSICAL FACTORS: Status: ACTIVE | Noted: 2023-10-11

## 2023-10-11 PROBLEM — R42 DYSEQUILIBRIUM: Status: ACTIVE | Noted: 2023-10-11

## 2023-10-11 PROBLEM — N63.10 BREAST MASS, RIGHT: Status: ACTIVE | Noted: 2023-10-11

## 2023-10-11 PROBLEM — J45.901 MILD ASTHMA WITH ACUTE EXACERBATION (HHS-HCC): Status: ACTIVE | Noted: 2023-10-11

## 2023-10-11 PROBLEM — M25.50 ARTHRALGIA OF MULTIPLE JOINTS: Status: ACTIVE | Noted: 2023-10-11

## 2023-10-11 PROBLEM — E11.3292 TYPE 2 DIABETES MELLITUS WITH MILD NONPROLIFERATIVE DIABETIC RETINOPATHY WITHOUT MACULAR EDEMA, LEFT EYE (MULTI): Status: ACTIVE | Noted: 2023-10-11

## 2023-10-11 PROBLEM — R29.898 WEAKNESS OF UPPER EXTREMITY: Status: ACTIVE | Noted: 2023-10-11

## 2023-10-11 PROBLEM — C50.911: Status: ACTIVE | Noted: 2023-10-11

## 2023-10-11 PROBLEM — H52.203 ASTIGMATISM OF BOTH EYES: Status: ACTIVE | Noted: 2023-10-11

## 2023-10-11 PROBLEM — R26.81 UNSTEADINESS ON FEET: Status: ACTIVE | Noted: 2023-10-11

## 2023-10-11 PROBLEM — K11.6 PAROTID CYST: Status: ACTIVE | Noted: 2023-10-11

## 2023-10-11 PROBLEM — R79.89 LOW VITAMIN D LEVEL: Status: ACTIVE | Noted: 2023-10-11

## 2023-10-11 PROBLEM — I50.30 (HFPEF) HEART FAILURE WITH PRESERVED EJECTION FRACTION (MULTI): Status: ACTIVE | Noted: 2023-10-11

## 2023-10-11 PROBLEM — G44.009 CLUSTER HEADACHES: Status: ACTIVE | Noted: 2023-10-11

## 2023-10-11 PROBLEM — R51.9 CHRONIC HEADACHES: Status: ACTIVE | Noted: 2023-10-11

## 2023-10-11 PROBLEM — M54.16 LUMBAR RADICULOPATHY: Status: ACTIVE | Noted: 2023-10-11

## 2023-10-11 RX ORDER — CAPSAICIN 0 G/G
CREAM TOPICAL 2 TIMES DAILY PRN
COMMUNITY
End: 2023-11-02 | Stop reason: ALTCHOICE

## 2023-10-11 RX ORDER — ASPIRIN 325 MG
1 TABLET ORAL DAILY PRN
COMMUNITY
End: 2023-11-02 | Stop reason: ALTCHOICE

## 2023-10-11 RX ORDER — LOSARTAN POTASSIUM AND HYDROCHLOROTHIAZIDE 25; 100 MG/1; MG/1
1 TABLET ORAL DAILY
COMMUNITY
Start: 2016-05-21 | End: 2023-11-02 | Stop reason: ALTCHOICE

## 2023-10-11 RX ORDER — SACUBITRIL AND VALSARTAN 97; 103 MG/1; MG/1
1 TABLET, FILM COATED ORAL 2 TIMES DAILY
COMMUNITY
Start: 2022-10-11 | End: 2023-11-02 | Stop reason: SDUPTHER

## 2023-10-11 RX ORDER — HYDRALAZINE HYDROCHLORIDE 50 MG/1
1 TABLET, FILM COATED ORAL 2 TIMES DAILY
COMMUNITY
Start: 2022-06-24 | End: 2023-11-02 | Stop reason: ALTCHOICE

## 2023-10-11 RX ORDER — PRAVASTATIN SODIUM 40 MG/1
1 TABLET ORAL DAILY
COMMUNITY
Start: 2016-05-21 | End: 2024-02-06 | Stop reason: SDUPTHER

## 2023-10-11 RX ORDER — POTASSIUM CHLORIDE 20 MEQ/1
1 TABLET, EXTENDED RELEASE ORAL 2 TIMES DAILY
COMMUNITY
Start: 2016-05-21 | End: 2023-11-02 | Stop reason: ALTCHOICE

## 2023-10-11 RX ORDER — SACUBITRIL AND VALSARTAN 49; 51 MG/1; MG/1
1 TABLET, FILM COATED ORAL 2 TIMES DAILY
COMMUNITY
Start: 2022-09-13 | End: 2023-11-02 | Stop reason: DRUGHIGH

## 2023-10-11 RX ORDER — TORSEMIDE 10 MG/1
3 TABLET ORAL DAILY
COMMUNITY
Start: 2023-05-04 | End: 2023-11-02 | Stop reason: ALTCHOICE

## 2023-10-11 RX ORDER — BUDESONIDE AND FORMOTEROL FUMARATE DIHYDRATE 80; 4.5 UG/1; UG/1
2 AEROSOL RESPIRATORY (INHALATION) 2 TIMES DAILY
COMMUNITY
Start: 2023-04-28 | End: 2024-05-23 | Stop reason: ENTERED-IN-ERROR

## 2023-10-11 RX ORDER — FLUTICASONE PROPIONATE 50 MCG
1 SPRAY, SUSPENSION (ML) NASAL NIGHTLY
COMMUNITY
Start: 2016-05-21 | End: 2023-11-02 | Stop reason: ALTCHOICE

## 2023-10-11 RX ORDER — TOPIRAMATE SPINKLE 25 MG/1
CAPSULE ORAL
COMMUNITY
Start: 2022-11-18 | End: 2023-11-02 | Stop reason: ALTCHOICE

## 2023-10-11 RX ORDER — PAROXETINE HYDROCHLORIDE 40 MG/1
40 TABLET, FILM COATED ORAL DAILY
COMMUNITY
Start: 2016-05-21 | End: 2023-11-02 | Stop reason: ALTCHOICE

## 2023-10-11 RX ORDER — ASCORBIC ACID 500 MG
1 TABLET,CHEWABLE ORAL DAILY
COMMUNITY
Start: 2013-10-11 | End: 2023-11-02 | Stop reason: ALTCHOICE

## 2023-10-11 RX ORDER — IPRATROPIUM BROMIDE AND ALBUTEROL SULFATE 2.5; .5 MG/3ML; MG/3ML
3 SOLUTION RESPIRATORY (INHALATION) EVERY 4 HOURS PRN
COMMUNITY
Start: 2016-05-21 | End: 2023-11-02 | Stop reason: ALTCHOICE

## 2023-10-11 RX ORDER — ALBUTEROL SULFATE 90 UG/1
1-2 AEROSOL, METERED RESPIRATORY (INHALATION) EVERY 4 HOURS PRN
COMMUNITY
Start: 2016-05-21 | End: 2023-11-02 | Stop reason: ALTCHOICE

## 2023-10-11 RX ORDER — ANASTROZOLE 1 MG/1
TABLET ORAL
COMMUNITY
Start: 2023-09-08 | End: 2024-02-22 | Stop reason: SDUPTHER

## 2023-10-11 RX ORDER — DEXTROMETHORPHAN HYDROBROMIDE, GUAIFENESIN 5; 100 MG/5ML; MG/5ML
1300 LIQUID ORAL EVERY 8 HOURS PRN
COMMUNITY

## 2023-10-11 RX ORDER — HYDRALAZINE HYDROCHLORIDE 10 MG/1
1 TABLET, FILM COATED ORAL 3 TIMES DAILY
COMMUNITY
Start: 2023-02-06 | End: 2024-01-03

## 2023-10-11 RX ORDER — DULOXETIN HYDROCHLORIDE 60 MG/1
2 CAPSULE, DELAYED RELEASE ORAL DAILY
COMMUNITY
Start: 2017-08-29 | End: 2023-11-02 | Stop reason: SDUPTHER

## 2023-10-11 RX ORDER — SENNOSIDES 8.6 MG/1
2 TABLET ORAL DAILY
COMMUNITY
Start: 2022-06-23 | End: 2023-11-02 | Stop reason: ALTCHOICE

## 2023-10-11 RX ORDER — ISOSORBIDE MONONITRATE 30 MG/1
1 TABLET, EXTENDED RELEASE ORAL DAILY
COMMUNITY
Start: 2023-02-06 | End: 2023-10-17

## 2023-10-11 RX ORDER — DOCUSATE SODIUM 100 MG/1
1 CAPSULE, LIQUID FILLED ORAL 2 TIMES DAILY
COMMUNITY
Start: 2022-06-23 | End: 2024-01-18 | Stop reason: WASHOUT

## 2023-10-11 RX ORDER — AMLODIPINE BESYLATE 10 MG/1
10 TABLET ORAL DAILY
COMMUNITY
Start: 2016-05-21 | End: 2023-11-02 | Stop reason: ALTCHOICE

## 2023-10-12 ENCOUNTER — INFUSION (OUTPATIENT)
Dept: HEMATOLOGY/ONCOLOGY | Facility: CLINIC | Age: 88
End: 2023-10-12
Payer: MEDICARE

## 2023-10-12 VITALS
RESPIRATION RATE: 18 BRPM | WEIGHT: 163.58 LBS | HEART RATE: 56 BPM | SYSTOLIC BLOOD PRESSURE: 160 MMHG | OXYGEN SATURATION: 95 % | BODY MASS INDEX: 27.22 KG/M2 | TEMPERATURE: 98.6 F | DIASTOLIC BLOOD PRESSURE: 78 MMHG

## 2023-10-12 DIAGNOSIS — N18.9 ANEMIA OF RENAL DISEASE: ICD-10-CM

## 2023-10-12 DIAGNOSIS — D63.1 ANEMIA OF RENAL DISEASE: ICD-10-CM

## 2023-10-12 DIAGNOSIS — I50.32 CHRONIC DIASTOLIC CONGESTIVE HEART FAILURE (MULTI): ICD-10-CM

## 2023-10-12 DIAGNOSIS — D50.0 IRON DEFICIENCY ANEMIA DUE TO CHRONIC BLOOD LOSS: ICD-10-CM

## 2023-10-12 LAB
ERYTHROCYTE [DISTWIDTH] IN BLOOD BY AUTOMATED COUNT: 15.8 % (ref 11.5–14.5)
HCT VFR BLD AUTO: 32.2 % (ref 36–46)
HGB BLD-MCNC: 9.5 G/DL (ref 12–16)
MCH RBC QN AUTO: 30.4 PG (ref 26–34)
MCHC RBC AUTO-ENTMCNC: 29.5 G/DL (ref 32–36)
MCV RBC AUTO: 103 FL (ref 80–100)
NRBC BLD-RTO: ABNORMAL /100{WBCS}
PLATELET # BLD AUTO: 180 X10*3/UL (ref 150–450)
PMV BLD AUTO: 10.6 FL (ref 7.5–11.5)
RBC # BLD AUTO: 3.13 X10*6/UL (ref 4–5.2)
WBC # BLD AUTO: 5 X10*3/UL (ref 4.4–11.3)

## 2023-10-12 PROCEDURE — 2500000004 HC RX 250 GENERAL PHARMACY W/ HCPCS (ALT 636 FOR OP/ED): Mod: JZ,JG | Performed by: STUDENT IN AN ORGANIZED HEALTH CARE EDUCATION/TRAINING PROGRAM

## 2023-10-12 PROCEDURE — 36415 COLL VENOUS BLD VENIPUNCTURE: CPT

## 2023-10-12 PROCEDURE — 96365 THER/PROPH/DIAG IV INF INIT: CPT

## 2023-10-12 PROCEDURE — 85027 COMPLETE CBC AUTOMATED: CPT

## 2023-10-12 RX ORDER — EPINEPHRINE 0.3 MG/.3ML
0.3 INJECTION SUBCUTANEOUS EVERY 5 MIN PRN
Status: CANCELLED | OUTPATIENT
Start: 2023-10-12

## 2023-10-12 RX ORDER — DIPHENHYDRAMINE HYDROCHLORIDE 50 MG/ML
50 INJECTION INTRAMUSCULAR; INTRAVENOUS AS NEEDED
Status: CANCELLED | OUTPATIENT
Start: 2023-10-12

## 2023-10-12 RX ORDER — FAMOTIDINE 10 MG/ML
20 INJECTION INTRAVENOUS ONCE AS NEEDED
Status: CANCELLED | OUTPATIENT
Start: 2023-10-12

## 2023-10-12 RX ORDER — ALBUTEROL SULFATE 0.83 MG/ML
3 SOLUTION RESPIRATORY (INHALATION) AS NEEDED
Status: CANCELLED | OUTPATIENT
Start: 2023-10-12

## 2023-10-12 RX ORDER — ACETAMINOPHEN 325 MG/1
650 TABLET ORAL ONCE
Status: DISCONTINUED | OUTPATIENT
Start: 2023-10-12 | End: 2023-10-12 | Stop reason: HOSPADM

## 2023-10-12 RX ORDER — ACETAMINOPHEN 325 MG/1
650 TABLET ORAL ONCE
Status: CANCELLED
Start: 2023-10-12 | End: 2023-10-12

## 2023-10-12 RX ADMIN — FERUMOXYTOL 510 MG: 510 INJECTION INTRAVENOUS at 08:15

## 2023-10-12 ASSESSMENT — PAIN SCALES - GENERAL: PAINLEVEL: 6

## 2023-10-12 NOTE — PROGRESS NOTES
Patient was identified as a fall risk. Risk prevention instructions provided.Patient was identified as a fall risk. Risk prevention instructions provided.

## 2023-10-12 NOTE — PATIENT INSTRUCTIONS

## 2023-10-16 DIAGNOSIS — I50.32 CHRONIC DIASTOLIC CONGESTIVE HEART FAILURE (MULTI): Primary | ICD-10-CM

## 2023-10-17 RX ORDER — ISOSORBIDE MONONITRATE 30 MG/1
30 TABLET, EXTENDED RELEASE ORAL DAILY
Qty: 90 TABLET | Refills: 1 | Status: SHIPPED | OUTPATIENT
Start: 2023-10-17 | End: 2024-01-02

## 2023-10-29 DIAGNOSIS — I82.432 ACUTE DEEP VEIN THROMBOSIS (DVT) OF POPLITEAL VEIN OF LEFT LOWER EXTREMITY (MULTI): ICD-10-CM

## 2023-10-29 DIAGNOSIS — I10 ESSENTIAL (PRIMARY) HYPERTENSION: ICD-10-CM

## 2023-10-29 DIAGNOSIS — K21.9 GASTROESOPHAGEAL REFLUX DISEASE WITHOUT ESOPHAGITIS: ICD-10-CM

## 2023-10-29 DIAGNOSIS — I50.9 CHRONIC CONGESTIVE HEART FAILURE, UNSPECIFIED HEART FAILURE TYPE (MULTI): ICD-10-CM

## 2023-11-02 ENCOUNTER — OFFICE VISIT (OUTPATIENT)
Dept: CARDIOLOGY | Facility: CLINIC | Age: 88
End: 2023-11-02
Payer: MEDICARE

## 2023-11-02 VITALS
HEART RATE: 70 BPM | OXYGEN SATURATION: 98 % | DIASTOLIC BLOOD PRESSURE: 60 MMHG | WEIGHT: 150 LBS | BODY MASS INDEX: 24.99 KG/M2 | SYSTOLIC BLOOD PRESSURE: 150 MMHG | HEIGHT: 65 IN

## 2023-11-02 DIAGNOSIS — I50.30 HEART FAILURE WITH PRESERVED EJECTION FRACTION, UNSPECIFIED HF CHRONICITY (MULTI): Primary | ICD-10-CM

## 2023-11-02 PROCEDURE — 3078F DIAST BP <80 MM HG: CPT | Performed by: STUDENT IN AN ORGANIZED HEALTH CARE EDUCATION/TRAINING PROGRAM

## 2023-11-02 PROCEDURE — 3077F SYST BP >= 140 MM HG: CPT | Performed by: STUDENT IN AN ORGANIZED HEALTH CARE EDUCATION/TRAINING PROGRAM

## 2023-11-02 PROCEDURE — 1125F AMNT PAIN NOTED PAIN PRSNT: CPT | Performed by: STUDENT IN AN ORGANIZED HEALTH CARE EDUCATION/TRAINING PROGRAM

## 2023-11-02 PROCEDURE — 99215 OFFICE O/P EST HI 40 MIN: CPT | Performed by: STUDENT IN AN ORGANIZED HEALTH CARE EDUCATION/TRAINING PROGRAM

## 2023-11-02 PROCEDURE — 1159F MED LIST DOCD IN RCRD: CPT | Performed by: STUDENT IN AN ORGANIZED HEALTH CARE EDUCATION/TRAINING PROGRAM

## 2023-11-02 PROCEDURE — 1036F TOBACCO NON-USER: CPT | Performed by: STUDENT IN AN ORGANIZED HEALTH CARE EDUCATION/TRAINING PROGRAM

## 2023-11-02 RX ORDER — SACUBITRIL AND VALSARTAN 97; 103 MG/1; MG/1
1 TABLET, FILM COATED ORAL 2 TIMES DAILY
Qty: 60 TABLET | Refills: 11 | Status: SHIPPED | OUTPATIENT
Start: 2023-11-02 | End: 2024-02-06 | Stop reason: SDUPTHER

## 2023-11-02 RX ORDER — DAPAGLIFLOZIN 10 MG/1
10 TABLET, FILM COATED ORAL DAILY
Qty: 30 TABLET | Refills: 11 | Status: SHIPPED | OUTPATIENT
Start: 2023-11-02 | End: 2024-02-06 | Stop reason: SDUPTHER

## 2023-11-02 ASSESSMENT — ENCOUNTER SYMPTOMS
OCCASIONAL FEELINGS OF UNSTEADINESS: 0
DEPRESSION: 0
LOSS OF SENSATION IN FEET: 0

## 2023-11-02 ASSESSMENT — PAIN SCALES - GENERAL: PAINLEVEL: 3

## 2023-11-02 NOTE — PROGRESS NOTES
Chief Complaint    Ambulatory heart failure care      History of Present Illness    Referring Clinician: Dr. 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 She has a past medical history significant for  ductal breast cancer not for surgical intervention, hypertension, history of headaches,DM, carpal tunnel syndrome, spinal cord stimulator, adult onset asthma, moderate aortic stenosis (?LFLG) and on TTE 6/2020 2P/M 32/18 with DI 0.30.  On TTE 6/2022 LVEF 45-50% with left ventricular thickening LVIDD 4.9 cm, minor RWMA, normal right ventricular systolic function. She was hospitalized 6/2022 with symptoms concerning for stroke, stroke was ruled out. 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 She was initiated on some heart failure pharmacotherapy. Since her last visit she has completed nuclear pharmacological stress evaluation 8/2022 which was NEGATIVE for inducible ischemia.Technetium pyrophosphate scan 2/2023 was NEGATIVE for cardiac amyloidosis and amyloidosis labs are wnl.  She was admitted 8/2023 with chest pain and CTA cors showed CAC 1099.5 with NOCAD which is being medically managed.  She is unable to get cardiac MRI due to the presence of a neurostimulator in her back.    Ms. Ramos tells me that she has decided against chemotherapy, and was told that she is high risk for breast cancer surgery.    Surgically she has some mild dyspnea on weekend that rapidly improves.  She continues to have a 1 pillow orthopnea but denies any PND.  Her leg swelling is substantially less than before per her report.  She has not had syncope, or presyncope.    Has been out of ARNi- needs prescription refill.    Surgical Hx:  -Back surgery  -Spinal cord stimulator  -Knee surgery ( left)     Past Obstetric Hx:  - P 3  - No cardiac complications of pregnancy    Social Hx:  - Smoking- never   - ETOH- never   - Illicit drugs- never   -  "Lives with son, feels safe   -     Family Hx:  Specifically, there is no family history of ICD, PPM, LVAD, OHT, arrhythmias, CVA, or sudden cardiac death.    Mother - CHF, CVA   Sister and brother- heart disease ? diagnoses   Brother - MI  Daughter - heart disease ? diagnosis     Medication reconciliation completed, see below.     Investigations:    The electronic medical record has been reviewed by me for salient history. All cardiovascular imaging and testing available in the electronic medical record, and [employer] has been reviewed.      Review of Systems    ROS   Constitutional: feeling tired and recent weight gain, but not feeling poorly, no fever and no chills.   Eyes: no eyesight problems.   ENT: no hearing loss.   Cardiovascular: as noted in HPI.   Respiratory: as noted in HPI.   Gastrointestinal: no blood in stools.   Genitourinary: no hematuria.   Neurological: no frequent falls, no dizziness and no fainting.   Endocrine: no thyroid disorder and no diabetes mellitus.   Hematologic/Lymphatic: does not bleed easily and does not bruise easily.      Vitals    Visit Vitals  /60   Pulse 70   Ht 1.651 m (5' 5\")   Wt 68 kg (150 lb)   LMP  (LMP Unknown)   SpO2 98%   BMI 24.96 kg/m²   OB Status Postmenopausal   Smoking Status Never   BSA 1.77 m²      Vital Signs    Recorded: [id number] 10:11AM   Heart Rate 54   Systolic 128   Diastolic 50   Height 5 ft 7 in   Weight 160 lb    BMI Calculated 25.06 kg/m2   BSA Calculated 1.84   O2 Saturation 96     Physical Exam  On examination:    Very pleasant elderly AA woman in no apparent CP or painful distress. Looks younger than stated age   Immaculately groomed   Neck: No JVD or HJR  CVS: HS 1,2. Gd 3 harsh ESM to carotids, PSM at LLSE accentuated with expiration  Resp: CTA bilaterally. Percussion note resonant   Abdomen: SNT, BS wnl  Extremities: No pedal oedema today (previously 2-3+ earlier in May 2023)  Skin: warm and dry  CNS: AO x 4, no gross deficits       "   Results/Data  Comprehensive Metabolic Panel [id number] 11:10AM Sarahi Garcia     Test Name Result Flag Reference   Glucose, Serum 143 mg/dL H 74 - 99   Sodium, Serum 142 mmol/L  136 - 145   POTASSIUM 4.4 mmol/L  3.5 - 5.3   Chloride, Serum 106 mmol/L  98 - 107   Bicarbonate, Serum 29 mmol/L  21 - 32   Anion Gap, Serum 11 mmol/L  10 - 20   Blood Urea Nitrogen, Serum 21 mg/dL  6 - 23   CREATININE 1.13 mg/dL H See Below   Reference Range: 0.50 - 1.05   Calcium, Serum 10.1 mg/dL  8.6 - 10.6   Albumin, Serum 4.0 g/dL  3.4 - 5.0   ALKALINE PHOSPHATASE 53 U/L  33 - 136   Protein, Total Serum 6.5 g/dL  6.4 - 8.2   Bilirubin, Serum Total 0.5 mg/dL  0.0 - 1.2   ALT (SGPT), Serum 6 U/L L 7 - 45   Patients treated with Sulfasalazine may generate    falsely decreased results for ALT.   GFR FEMALE 48 mL/min/1.73m2 A >90   CALCULATIONS OF ESTIMATED GFR ARE PERFORMED   USING THE 2021 CKD-EPI STUDY REFIT EQUATION   WITHOUT THE RACE VARIABLE FOR THE IDMS-TRACEABLE   CREATININE METHODS.    https://jasn.asnjournals.org/content/early/2021/09/22/ASN.[email or web address]   AST 11 U/L  9 - 39   Glucose, Serum 143 mg/dL H 74 - 99   Sodium, Serum 142 mmol/L  136 - 145   POTASSIUM 4.4 mmol/L  3.5 - 5.3   Chloride, Serum 106 mmol/L  98 - 107   Bicarbonate, Serum 29 mmol/L  21 - 32   Anion Gap, Serum 11 mmol/L  10 - 20   Blood Urea Nitrogen, Serum 21 mg/dL  6 - 23   CREATININE 1.13 mg/dL H See Below   Reference Range: 0.50 - 1.05   Calcium, Serum 10.1 mg/dL  8.6 - 10.6   Albumin, Serum 4.0 g/dL  3.4 - 5.0   ALKALINE PHOSPHATASE 53 U/L  33 - 136   Protein, Total Serum 6.5 g/dL  6.4 - 8.2   Bilirubin, Serum Total 0.5 mg/dL  0.0 - 1.2   ALT (SGPT), Serum 6 U/L L 7 - 45   Patients treated with Sulfasalazine may generate    falsely decreased results for ALT.   GFR FEMALE 48 mL/min/1.73m2 A >90   CALCULATIONS OF ESTIMATED GFR ARE PERFORMED   USING THE 2021 CKD-EPI STUDY REFIT EQUATION   WITHOUT THE RACE VARIABLE FOR THE IDMS-TRACEABLE   CREATININE  METHODS.    https://jasn.asnjournals.org/content/early/2021/09/22/ASN.[email or web address]   AST 11 U/L  9 - 39           Brain Natriuretic Peptide BNP 22May2023 11:10AM Sarahi Garcia     Test Name Result Flag Reference   Brain Natriuretic Peptide 1355 pg/mL H 0 - 99   .  <100 pg/mL - Heart failure unlikely  100-299 pg/mL - Intermediate probability of acute heart  .               failure exacerbation. Correlate with clinical  .               context and patient history.    >=300 pg/mL - Heart Failure likely. Correlate with clinical  .               context and patient history.   Biotin interference may cause falsely decreased results.   Patients taking a Biotin dose of up to 5 mg/day should   refrain from taking Biotin for 24 hours before sample   collection. Providers may contact their local laboratory   for further information.                       TSH WITH REFLEX TO FREE T4 IF ABNORMAL 22May2023 11:10AM Sarahi Garcia     Test Name Result Flag Reference   Thyroid Stimulating Hormone, Serum 3.24 mIU/L  See Below   Reference Range: 0.44 - 3.98   TSH testing is performed using different testing    methodology at [facility] than at other    [facility]. Direct result comparisons should    only be made within the same method.                       Complete Blood Count 22May2023 11:10AM Sarahi Garcia     Test Name Result Flag Reference   White Blood Cell Count 5.0 x10E9/L  4.4 - 11.3   Red Blood Cell Count 3.33 x10E12/L L See Below   Reference Range: 4.00 - 5.20   Nucleated Erythrocyte Count 0.0 /100 WBC  0.0-0.0   Hemoglobin 9.4 g/dL L See Below   Reference Range: 12.0 - 16.0   HCT 31.1 % L See Below   Reference Range: 36.0 - 46.0   MCV 93 fL  80 - 100   MCHC 30.2 g/dL L See Below   Reference Range: 32.0 - 36.0   Platelet Count 226 x10E9/L  150 - 450   RDW-CV 16.8 % H See Below   Reference Range: 11.5 - 14.5   White Blood Cell Count 5.0 x10E9/L  4.4 - 11.3   Red Blood Cell Count 3.33 x10E12/L L  See Below   Reference Range: 4.00 - 5.20   Nucleated Erythrocyte Count 0.0 /100 WBC  0.0-0.0   Hemoglobin 9.4 g/dL L See Below   Reference Range: 12.0 - 16.0   HCT 31.1 % L See Below   Reference Range: 36.0 - 46.0   MCV 93 fL  80 - 100   MCHC 30.2 g/dL L See Below   Reference Range: 32.0 - 36.0   Platelet Count 226 x10E9/L  150 - 450   RDW-CV 16.8 % H See Below   Reference Range: 11.5 - 14.5           Amylase, Serum [id number] 11:10AM Sarahi Garcia     Test Name Result Flag Reference   Amylase, Serum 40 U/L  29 - 103                 Lipase, Serum [id number] 11:10AM Sarahi Garcia     Test Name Result Flag Reference   Lipase, Serum 9 U/L  9 - 82   Venipuncture immediately after or during the    administration of Metamizole may lead to falsely   low results. Testing should be performed immediately   prior to Metamizole dosing.                         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 She has a past medical history significant for ductal breast cancer diagnosed 2023 not for surgical intervention and she has declined chemotherapy, hypertension, history of headaches,DM, iron deficiency anemia, carpal tunnel syndrome, spinal cord stimulator, adult onset asthma, moderate aortic stenosis (?LFLG) and on TTE 6/2020 2P/M 32/18 with DI 0.30.  On TTE 6/2022 LVEF 45-50% with left ventricular thickening LVIDD 4.9 cm, minor RWMA, normal right ventricular systolic function. She was hospitalized 6/2022 with symptoms concerning for stroke, stroke was ruled out. 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 She was initiated on some heart failure pharmacotherapy. On nuclear pharmacological stress evaluation 8/2022 which was NEGATIVE for inducible ischemia.Technetium pyrophosphate scan 2/2023 was NEGATIVE for cardiac amyloidosis and amyloidosis labs are wnl.  She has been seen by the structural heart team no chronic ntervention indicated  for aortic stenosis.  Previously she was initiated on spironolactone, and low-dose empagliflozin were initiated. She is no longer taking empagliflozin which caused hematuria. Fortunately the hematuria completely resolved with the cessation of SGLT2 inhibition. She will not be rechallenged.  Previously, amlodipine was also discontinued and with the cessation of this drug, less leg swelling.  She has been initiated on ARNi and this was very well-tolerated.   She was admitted 8/2023 with chest pain and CTA cors showed CAC 1099.5 with NOCAD which is being medically managed.  The presence of a neurostimulator in her back precludes her being able to get cardiac MRI    NYHA Functional Class: 3  ACC/AHA Stage C heart failure  Volume status: Euvolemic  Perfusion status: warm to touch  Aetiology: ? valvular TBD    PLAN:    #HFmrEF    -Heart failure pharmacotherapy will be modified as below  -Labs: RFP, BNP before next visit  -Heart failure lifestyle modifications discussed and Qs answered.     -Medication optimisation, hypertensive today:  BB: Continue carvedilol 25 mg twice daily  RAASi: Continue sacubitril/valsartan 97/103 mg twice daily  AA: Continue spironolactone 25 mg once daily, and will continue to hold supplemental potassium  SGLT2i: Hematuria with this empagliflozin.  We discussed and will start dapagliflozin 10 mg once daily.  She will let me know if she has any adverse side effects  Hydralazine: 10 mg 3 times daily, adherence encouraged  Isosorbide mononitrate: Continue 30 mg once daily  CCB: Continue to hold amlodipine (no further leg swelling with its discontinuation)  Torsemide:  has not been using    #Moderate aortic stenosis (? LFLG)  -Has been reviewed by the heart valve team  - Annual TTE, next before next visit    #Mitral regurgitation  We will evaluate severity with next TTE     #Adult onset asthma  Previously referred to pulmonology for maintenance care    #weight loss  Determined to be due to her breast  cancer    This note was transcribed using the Dragon Dictation system. There may be grammatical, punctuation, or verbiage errors that can occur with voice recognition programs.

## 2023-11-02 NOTE — PATIENT INSTRUCTIONS
Thank you for coming in today. If you have any questions or concerns, you may call the Heart Failure Office at [phone] option [phone], or [phone]. You may also contact our heart failure nursing team via email on [email or web address]    Please bring all your pills/medications to your Cardiology appointments.    **  - START Farxiga 10 mg once a day.  Please let me know ( Tel [phone]) if you have any side effects with this pill as we discussed   You will get coupons for this new pill today     - We will renew all your heart medications today, including Entresto    - Please have the following tests done:   Blood tests ( BNP, comprehensive panel, amylase, lipase, CBC, TSH with auto reflex)    - Please make an appointment to be seen in 3 months

## 2023-11-03 ENCOUNTER — PATIENT OUTREACH (OUTPATIENT)
Dept: CARE COORDINATION | Facility: CLINIC | Age: 88
End: 2023-11-03
Payer: MEDICARE

## 2023-11-03 NOTE — PROGRESS NOTES
Patient has met target of no readmission for (90) days post (hospital, discharge and is graduated from Transitional Care Management program at this time.

## 2023-11-10 RX ORDER — SPIRONOLACTONE 25 MG/1
25 TABLET ORAL 2 TIMES DAILY
Qty: 90 TABLET | Refills: 0 | Status: SHIPPED | OUTPATIENT
Start: 2023-11-10 | End: 2024-01-05

## 2023-11-10 RX ORDER — PANTOPRAZOLE SODIUM 40 MG/1
40 TABLET, DELAYED RELEASE ORAL DAILY
Qty: 90 TABLET | Refills: 0 | Status: SHIPPED | OUTPATIENT
Start: 2023-11-10 | End: 2024-02-01

## 2023-11-10 RX ORDER — APIXABAN 5 MG/1
5 TABLET, FILM COATED ORAL 2 TIMES DAILY
Qty: 180 TABLET | Refills: 0 | Status: SHIPPED | OUTPATIENT
Start: 2023-11-10 | End: 2023-11-21 | Stop reason: SDUPTHER

## 2023-11-10 RX ORDER — CARVEDILOL 25 MG/1
25 TABLET ORAL
Qty: 90 TABLET | Refills: 0 | Status: SHIPPED | OUTPATIENT
Start: 2023-11-10 | End: 2023-11-21 | Stop reason: SDUPTHER

## 2023-11-21 ENCOUNTER — OFFICE VISIT (OUTPATIENT)
Dept: PRIMARY CARE | Facility: CLINIC | Age: 88
End: 2023-11-21
Payer: MEDICARE

## 2023-11-21 VITALS
DIASTOLIC BLOOD PRESSURE: 59 MMHG | WEIGHT: 159 LBS | BODY MASS INDEX: 26.46 KG/M2 | HEART RATE: 79 BPM | SYSTOLIC BLOOD PRESSURE: 127 MMHG | TEMPERATURE: 98 F

## 2023-11-21 DIAGNOSIS — G89.29 CHRONIC LOW BACK PAIN WITH SCIATICA, SCIATICA LATERALITY UNSPECIFIED, UNSPECIFIED BACK PAIN LATERALITY: ICD-10-CM

## 2023-11-21 DIAGNOSIS — E11.9 DIABETES MELLITUS TYPE 2 IN NONOBESE (MULTI): ICD-10-CM

## 2023-11-21 DIAGNOSIS — E11.3292 TYPE 2 DIABETES MELLITUS WITH LEFT EYE AFFECTED BY MILD NONPROLIFERATIVE RETINOPATHY WITHOUT MACULAR EDEMA, WITHOUT LONG-TERM CURRENT USE OF INSULIN (MULTI): Primary | ICD-10-CM

## 2023-11-21 DIAGNOSIS — M54.40 CHRONIC LOW BACK PAIN WITH SCIATICA, SCIATICA LATERALITY UNSPECIFIED, UNSPECIFIED BACK PAIN LATERALITY: ICD-10-CM

## 2023-11-21 DIAGNOSIS — H91.93 BILATERAL HEARING LOSS, UNSPECIFIED HEARING LOSS TYPE: ICD-10-CM

## 2023-11-21 DIAGNOSIS — Z00.00 ROUTINE GENERAL MEDICAL EXAMINATION AT HEALTH CARE FACILITY: ICD-10-CM

## 2023-11-21 DIAGNOSIS — I82.432 ACUTE DEEP VEIN THROMBOSIS (DVT) OF POPLITEAL VEIN OF LEFT LOWER EXTREMITY (MULTI): ICD-10-CM

## 2023-11-21 DIAGNOSIS — Z23 IMMUNIZATION DUE: ICD-10-CM

## 2023-11-21 DIAGNOSIS — I50.9 CHRONIC CONGESTIVE HEART FAILURE, UNSPECIFIED HEART FAILURE TYPE (MULTI): ICD-10-CM

## 2023-11-21 DIAGNOSIS — Z00.00 HEALTH CARE MAINTENANCE: ICD-10-CM

## 2023-11-21 PROCEDURE — 3074F SYST BP LT 130 MM HG: CPT | Performed by: FAMILY MEDICINE

## 2023-11-21 PROCEDURE — G0008 ADMIN INFLUENZA VIRUS VAC: HCPCS | Performed by: FAMILY MEDICINE

## 2023-11-21 PROCEDURE — 1170F FXNL STATUS ASSESSED: CPT | Performed by: FAMILY MEDICINE

## 2023-11-21 PROCEDURE — G0439 PPPS, SUBSEQ VISIT: HCPCS | Performed by: FAMILY MEDICINE

## 2023-11-21 PROCEDURE — 1125F AMNT PAIN NOTED PAIN PRSNT: CPT | Performed by: FAMILY MEDICINE

## 2023-11-21 PROCEDURE — 1159F MED LIST DOCD IN RCRD: CPT | Performed by: FAMILY MEDICINE

## 2023-11-21 PROCEDURE — 3078F DIAST BP <80 MM HG: CPT | Performed by: FAMILY MEDICINE

## 2023-11-21 PROCEDURE — 1036F TOBACCO NON-USER: CPT | Performed by: FAMILY MEDICINE

## 2023-11-21 PROCEDURE — 90662 IIV NO PRSV INCREASED AG IM: CPT | Performed by: FAMILY MEDICINE

## 2023-11-21 RX ORDER — DULOXETIN HYDROCHLORIDE 30 MG/1
90 CAPSULE, DELAYED RELEASE ORAL DAILY
Qty: 270 CAPSULE | Refills: 1 | Status: SHIPPED | OUTPATIENT
Start: 2023-11-21 | End: 2024-05-27

## 2023-11-21 RX ORDER — METFORMIN HYDROCHLORIDE 500 MG/1
500 TABLET ORAL 2 TIMES DAILY
Qty: 180 TABLET | Refills: 1 | Status: SHIPPED | OUTPATIENT
Start: 2023-11-21 | End: 2024-03-24

## 2023-11-21 RX ORDER — CARVEDILOL 25 MG/1
25 TABLET ORAL
Qty: 180 TABLET | Refills: 1 | Status: SHIPPED | OUTPATIENT
Start: 2023-11-21 | End: 2024-01-30

## 2023-11-21 ASSESSMENT — PATIENT HEALTH QUESTIONNAIRE - PHQ9
1. LITTLE INTEREST OR PLEASURE IN DOING THINGS: NOT AT ALL
2. FEELING DOWN, DEPRESSED OR HOPELESS: NOT AT ALL
SUM OF ALL RESPONSES TO PHQ9 QUESTIONS 1 AND 2: 0

## 2023-11-21 ASSESSMENT — ENCOUNTER SYMPTOMS
LOSS OF SENSATION IN FEET: 0
OCCASIONAL FEELINGS OF UNSTEADINESS: 1
DEPRESSION: 0

## 2023-11-21 ASSESSMENT — ACTIVITIES OF DAILY LIVING (ADL)
GROCERY_SHOPPING: INDEPENDENT
DOING_HOUSEWORK: INDEPENDENT
BATHING: INDEPENDENT
DRESSING: INDEPENDENT
TAKING_MEDICATION: INDEPENDENT
MANAGING_FINANCES: INDEPENDENT

## 2023-11-21 NOTE — PROGRESS NOTES
"Subjective   Patient ID: Raegan Ramos is a 87 y.o. female who presents for her MWV.  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 She is here for medication refills for:     1- MWV - Medicare Wellness.  - Blood test as above.  - mammogram 6/27/2023: right breast cancer.  - Dexa scan- Osteopenia- Repeat in 2y- pending.  Ordered by her oncologist.  - Ca w/ D recommended.   - Influenza vaccin given today.  - Hearing screening referral requested.  Referral done today.  - Eye exam was 3 year ago. Recommended.    2-  medication refills.     A review of system was completed.  All systems were reviewed and were normal, except for the ones that are listed in the HPI.    Objective   Physical Exam  Constitutional:       Appearance: Normal appearance.   HENT:      Head: Normocephalic and atraumatic.      Right Ear: Tympanic membrane, ear canal and external ear normal.      Left Ear: Tympanic membrane, ear canal and external ear normal.      Nose: Nose normal.      Mouth/Throat:      Mouth: Mucous membranes are moist.      Pharynx: Oropharynx is clear.   Eyes:      Extraocular Movements: Extraocular movements intact.      Conjunctiva/sclera: Conjunctivae normal.      Pupils: Pupils are equal, round, and reactive to light.   Cardiovascular:      Rate and Rhythm: Normal rate and regular rhythm.      Pulses: Normal pulses.   Pulmonary:      Effort: Pulmonary effort is normal.      Breath sounds: Normal breath sounds.   Abdominal:      General: Abdomen is flat. Bowel sounds are normal.      Palpations: Abdomen is soft.   Musculoskeletal:         General: Normal range of motion.      Cervical back: Normal " range of motion and neck supple.   Skin:     General: Skin is warm.   Neurological:      General: No focal deficit present.      Mental Status: She is alert and oriented to person, place, and time. Mental status is at baseline.   Psychiatric:         Mood and Affect: Mood normal.         Behavior: Behavior normal.         Thought Content: Thought content normal.         Judgment: Judgment normal.         Assessment/Plan   Problem List Items Addressed This Visit       Acute deep vein thrombosis (DVT) of popliteal vein of left lower extremity (CMS/[facility])    Relevant Medications    apixaban (Eliquis) 5 mg tablet    Diabetes mellitus type 2 in nonobese (CMS/[facility])    Relevant Medications    metFORMIN (Glucophage) 500 mg tablet    Congestive heart failure (CMS/[facility])    Relevant Medications    carvedilol (Coreg) 25 mg tablet    Lumbago    Relevant Medications    DULoxetine (Cymbalta) 30 mg DR capsule    Type 2 diabetes mellitus with mild nonproliferative diabetic retinopathy without macular edema, left eye (CMS/[facility]) - Primary    Relevant Medications    zoster vaccine-recombinant adjuvanted (Shingrix) 50 mcg/0.5 mL vaccine    Other Relevant Orders    Albumin , Urine Random    Hemoglobin A1C    Lipid Panel    Comprehensive Metabolic Panel    Referral to Audiology    Bilateral hearing loss    Immunization due    Relevant Medications    zoster vaccine-recombinant adjuvanted (Shingrix) 50 mcg/0.5 mL vaccine    diphth,pertus,acell,,tetanus (BoostRIX) 2.5-8-5 Lf-mcg-Lf/0.5mL injection    Health care maintenance     - Blood test as below.  Ordered.  - Mammogram 6/27/2023: right breast cancer.  - Dexa scan- Osteopenia- Repeat in 2y- pending.  Ordered by her oncologist.  - Ca w/ D recommended.   - Influenza vaccin given today.  - Hearing screening referral requested.  Referral done today.  - Eye exam was 3 year ago. Recommended.            Other Visit Diagnoses       Routine general medical examination at health care facility         Relevant Orders    1 Year Follow Up In Primary Care - Wellness Exam           Patient to return to office in 6 months.

## 2023-11-21 NOTE — ASSESSMENT & PLAN NOTE
- Blood test as below.  Ordered.  - Mammogram 6/27/2023: right breast cancer.  - Dexa scan- Osteopenia- Repeat in 2y- pending.  Ordered by her oncologist.  - Ca w/ D recommended.   - Influenza vaccin given today.  - Hearing screening referral requested.  Referral done today.  - Eye exam was 3 year ago. Recommended.

## 2023-11-26 DIAGNOSIS — E78.00 PURE HYPERCHOLESTEROLEMIA: ICD-10-CM

## 2023-11-27 ENCOUNTER — LAB (OUTPATIENT)
Dept: LAB | Facility: LAB | Age: 88
End: 2023-11-27
Payer: MEDICARE

## 2023-11-27 DIAGNOSIS — E11.3292 TYPE 2 DIABETES MELLITUS WITH LEFT EYE AFFECTED BY MILD NONPROLIFERATIVE RETINOPATHY WITHOUT MACULAR EDEMA, WITHOUT LONG-TERM CURRENT USE OF INSULIN (MULTI): ICD-10-CM

## 2023-11-27 LAB
ALBUMIN SERPL BCP-MCNC: 3.8 G/DL (ref 3.4–5)
ALP SERPL-CCNC: 56 U/L (ref 33–136)
ALT SERPL W P-5'-P-CCNC: 6 U/L (ref 7–45)
ANION GAP SERPL CALC-SCNC: 13 MMOL/L (ref 10–20)
AST SERPL W P-5'-P-CCNC: 10 U/L (ref 9–39)
BILIRUB SERPL-MCNC: 0.3 MG/DL (ref 0–1.2)
BUN SERPL-MCNC: 21 MG/DL (ref 6–23)
CALCIUM SERPL-MCNC: 9.8 MG/DL (ref 8.6–10.6)
CHLORIDE SERPL-SCNC: 104 MMOL/L (ref 98–107)
CHOLEST SERPL-MCNC: 169 MG/DL (ref 0–199)
CHOLESTEROL/HDL RATIO: 2.4
CO2 SERPL-SCNC: 29 MMOL/L (ref 21–32)
CREAT SERPL-MCNC: 1.1 MG/DL (ref 0.5–1.05)
EST. AVERAGE GLUCOSE BLD GHB EST-MCNC: 117 MG/DL
GFR SERPL CREATININE-BSD FRML MDRD: 49 ML/MIN/1.73M*2
GLUCOSE SERPL-MCNC: 127 MG/DL (ref 74–99)
HBA1C MFR BLD: 5.7 %
HDLC SERPL-MCNC: 69 MG/DL
LDLC SERPL CALC-MCNC: 80 MG/DL
NON HDL CHOLESTEROL: 100 MG/DL (ref 0–149)
POTASSIUM SERPL-SCNC: 4.3 MMOL/L (ref 3.5–5.3)
PROT SERPL-MCNC: 5.7 G/DL (ref 6.4–8.2)
SODIUM SERPL-SCNC: 142 MMOL/L (ref 136–145)
TRIGL SERPL-MCNC: 101 MG/DL (ref 0–149)
VLDL: 20 MG/DL (ref 0–40)

## 2023-11-27 PROCEDURE — 83036 HEMOGLOBIN GLYCOSYLATED A1C: CPT

## 2023-11-27 PROCEDURE — 36415 COLL VENOUS BLD VENIPUNCTURE: CPT

## 2023-11-27 PROCEDURE — 80061 LIPID PANEL: CPT

## 2023-11-27 PROCEDURE — 80053 COMPREHEN METABOLIC PANEL: CPT

## 2023-12-01 RX ORDER — SIMVASTATIN 10 MG/1
TABLET, FILM COATED ORAL
Qty: 90 TABLET | Refills: 1 | Status: SHIPPED | OUTPATIENT
Start: 2023-12-01 | End: 2024-01-18 | Stop reason: WASHOUT

## 2023-12-05 ENCOUNTER — OFFICE VISIT (OUTPATIENT)
Dept: OBSTETRICS AND GYNECOLOGY | Facility: CLINIC | Age: 88
End: 2023-12-05
Payer: MEDICARE

## 2023-12-05 DIAGNOSIS — R93.5 ABNORMAL CT SCAN, PELVIS: Primary | ICD-10-CM

## 2023-12-05 PROCEDURE — 99203 OFFICE O/P NEW LOW 30 MIN: CPT | Performed by: OBSTETRICS & GYNECOLOGY

## 2023-12-05 PROCEDURE — 1036F TOBACCO NON-USER: CPT | Performed by: OBSTETRICS & GYNECOLOGY

## 2023-12-05 PROCEDURE — 1125F AMNT PAIN NOTED PAIN PRSNT: CPT | Performed by: OBSTETRICS & GYNECOLOGY

## 2023-12-05 PROCEDURE — 1159F MED LIST DOCD IN RCRD: CPT | Performed by: OBSTETRICS & GYNECOLOGY

## 2023-12-05 NOTE — PROGRESS NOTES
Raegan Ramos is a 87 y.o. female who presents with a chief complaint of adnexal cyst (Adnexal cyst)  SUBJECTIVE  She had a history of a hysterectomy and unilateral oophorectomy due to benign fibroids.  She has a CT scan July 5, 2023 that was noted to have a change on the right adnexal c/w a cyst, but with increased attenuation along the retroperitoneum.  Could be ovarian veins,thrombosis or adenopathy.  She has no pain.  She does have a history of right breast cancer diagnosis July 2023.  She has a large right parotid tumor.  She has a neurostimulator device that prevents her proceeding with an MRI.    She denies any postmenopausal bleeding or dysuria.  There is urinary frequency.  She has a thin white discharge without itching or odor.  No fevers or chills.    Past Medical History:   Diagnosis Date    Anemia of renal disease 04/24/2023    Chronic diastolic congestive heart failure (CMS/HCC) 09/29/2023    Essential (primary) hypertension     Essential hypertension, benign    Long term (current) use of non-steroidal anti-inflammatories (nsaid)     NSAID long-term use    Melena     Melena    Other chronic pain     Chronic pain    Other forms of dyspnea     Dyspnea on exertion    Other specified soft tissue disorders     Soft tissue mass    Pain in right knee 11/19/2020    Chronic pain of right knee    Pain in unspecified knee     Pain in joint, lower leg    Personal history of other diseases of the musculoskeletal system and connective tissue     History of low back pain    Personal history of other diseases of the musculoskeletal system and connective tissue 10/11/2022    History of spinal stenosis    Personal history of other diseases of the respiratory system     History of allergic rhinitis    Personal history of other diseases of the respiratory system 02/15/2018    History of acute bronchitis    Personal history of other endocrine, nutritional and metabolic disease     History of type 2 diabetes mellitus     Personal history of other endocrine, nutritional and metabolic disease     History of hypokalemia    Type 2 diabetes mellitus without complications (CMS/[facility]) 08/15/2018    Diabetes mellitus type 2, controlled     Past Surgical History:   Procedure Laterality Date    BACK SURGERY  10/11/2022    Back Surgery    CT ANGIO CORONARY ART WITH HEARTFLOW IF SCORE >30%  8/7/2023    CT HEART CORONARY ANGIOGRAM 8/7/2023 [facility] CT    HYSTERECTOMY  11/16/2017    Hysterectomy    OTHER SURGICAL HISTORY  10/14/2016    Dental Surgery    TOTAL KNEE ARTHROPLASTY  10/11/2022    Knee Replacement     Social History     Socioeconomic History    Marital status:      Spouse name: Not on file    Number of children: Not on file    Years of education: Not on file    Highest education level: Not on file   Occupational History    Not on file   Tobacco Use    Smoking status: Never    Smokeless tobacco: Never   Vaping Use    Vaping Use: Never used   Substance and Sexual Activity    Alcohol use: Never    Drug use: Never    Sexual activity: Not on file   Other Topics Concern    Not on file   Social History Narrative    Not on file     Social Determinants of Health     Financial Resource Strain: Not on file   Food Insecurity: Not on file   Transportation Needs: Not on file   Physical Activity: Not on file   Stress: Not on file   Social Connections: Not on file   Intimate Partner Violence: Not on file   Housing Stability: Not on file     No family history on file.    OB History   No obstetric history on file.       OBJECTIVE  Allergies   Allergen Reactions    House Dust Other and Cough    Hydrocodone-Acetaminophen Nausea Only and Nausea/vomiting    Mold Unknown      (Not in a hospital admission)       Review of Systems  General ROS: positive for  - weight loss  Physical Exam the external genitalia appear normal, no lesions.  On speculum exam there are no lesions, no changes.  Bimanual exam is nontender, no palpable masses.  General Appearance: well  developed, well nourished    LMP  (LMP Unknown)    Problem List Items Addressed This Visit    None  Visit Diagnoses       Abnormal CT scan, pelvis    -  Primary    Relevant Orders    US pelvis        This is an 87-year-old female that has a CT scan in July 2023 that suggests right adnexal changes.  She has other sources of pathology. (Breast cancer, parotid mass) the patient is not exhibiting pain and is not particularly interested in aggressive management of any abnormalities.  I recommended a pelvic ultrasound to see if this may further elucidate the changes in the right adnexal area.  No Pap smear was needed.    I will call her with results and the plan.  She will follow-up as needed.

## 2023-12-11 ENCOUNTER — ANCILLARY PROCEDURE (OUTPATIENT)
Dept: RADIOLOGY | Facility: CLINIC | Age: 88
End: 2023-12-11
Payer: MEDICARE

## 2023-12-11 DIAGNOSIS — R93.5 ABNORMAL FINDINGS ON DIAGNOSTIC IMAGING OF OTHER ABDOMINAL REGIONS, INCLUDING RETROPERITONEUM: ICD-10-CM

## 2023-12-11 DIAGNOSIS — R93.5 ABNORMAL CT SCAN, PELVIS: ICD-10-CM

## 2023-12-11 PROCEDURE — 76856 US EXAM PELVIC COMPLETE: CPT | Performed by: STUDENT IN AN ORGANIZED HEALTH CARE EDUCATION/TRAINING PROGRAM

## 2023-12-11 PROCEDURE — 76830 TRANSVAGINAL US NON-OB: CPT

## 2023-12-11 PROCEDURE — 76830 TRANSVAGINAL US NON-OB: CPT | Performed by: STUDENT IN AN ORGANIZED HEALTH CARE EDUCATION/TRAINING PROGRAM

## 2023-12-11 PROCEDURE — 76856 US EXAM PELVIC COMPLETE: CPT

## 2023-12-13 NOTE — RESULT ENCOUNTER NOTE
The ultrasound is reassuring.   It notes a simple appearing right ovarian cyst of 2.1 cm.  No follow-up is needed unless there are new symptoms such as pain or bloating.

## 2023-12-30 DIAGNOSIS — I50.32 CHRONIC DIASTOLIC CONGESTIVE HEART FAILURE (MULTI): ICD-10-CM

## 2023-12-31 DIAGNOSIS — I10 ESSENTIAL (PRIMARY) HYPERTENSION: ICD-10-CM

## 2024-01-02 RX ORDER — ISOSORBIDE MONONITRATE 30 MG/1
30 TABLET, EXTENDED RELEASE ORAL DAILY
Qty: 30 TABLET | Refills: 0 | Status: SHIPPED | OUTPATIENT
Start: 2024-01-02 | End: 2024-01-19

## 2024-01-03 DIAGNOSIS — I50.30 HEART FAILURE WITH PRESERVED EJECTION FRACTION, UNSPECIFIED HF CHRONICITY (MULTI): Primary | ICD-10-CM

## 2024-01-03 RX ORDER — HYDRALAZINE HYDROCHLORIDE 10 MG/1
10 TABLET, FILM COATED ORAL 3 TIMES DAILY
Qty: 90 TABLET | Refills: 0 | Status: SHIPPED | OUTPATIENT
Start: 2024-01-03 | End: 2024-01-24

## 2024-01-05 ENCOUNTER — CLINICAL SUPPORT (OUTPATIENT)
Dept: AUDIOLOGY | Facility: CLINIC | Age: 89
End: 2024-01-05
Payer: MEDICARE

## 2024-01-05 DIAGNOSIS — H90.3 SENSORINEURAL HEARING LOSS, BILATERAL: Primary | ICD-10-CM

## 2024-01-05 DIAGNOSIS — E11.3292 TYPE 2 DIABETES MELLITUS WITH LEFT EYE AFFECTED BY MILD NONPROLIFERATIVE RETINOPATHY WITHOUT MACULAR EDEMA, WITHOUT LONG-TERM CURRENT USE OF INSULIN (MULTI): ICD-10-CM

## 2024-01-05 PROCEDURE — 92557 COMPREHENSIVE HEARING TEST: CPT

## 2024-01-05 PROCEDURE — 92550 TYMPANOMETRY & REFLEX THRESH: CPT

## 2024-01-05 RX ORDER — SPIRONOLACTONE 25 MG/1
25 TABLET ORAL 2 TIMES DAILY
Qty: 90 TABLET | Refills: 1 | Status: SHIPPED | OUTPATIENT
Start: 2024-01-05 | End: 2024-02-06 | Stop reason: SDUPTHER

## 2024-01-05 ASSESSMENT — PAIN SCALES - GENERAL: PAINLEVEL_OUTOF10: 0 - NO PAIN

## 2024-01-05 ASSESSMENT — ENCOUNTER SYMPTOMS: OCCASIONAL FEELINGS OF UNSTEADINESS: 1

## 2024-01-05 ASSESSMENT — PAIN - FUNCTIONAL ASSESSMENT: PAIN_FUNCTIONAL_ASSESSMENT: 0-10

## 2024-01-05 NOTE — PROGRESS NOTES
ADULT AUDIOLOGY AUDIOMETRIC EVALUATION    Name:  Raegan Ramos  :  1935  Age:  88 y.o.  Date of Evaluation:  2024    HISTORY  Raegan Ramos is seen today at the request of An Jones MD for an evaluation of hearing.  Patient arrives with the complaint of hearing loss and infrequent intermittent tinnitus. Patient denies ear pain, ear pressure, ear drainage, ear infections, ear surgeries, noise exposure, family history of hearing loss and dizziness/vertigo.      AUDIOLOGIC EVALUATION    OTOSCOPY  Otoscopic inspection revealed clear canals and visualization of the eardrum bilaterally.    IMMITTANCE  Normal tympanograms were obtained bilaterally, consistent with a normal moving eardrums and the likely absence of fluid.    Ipsilateral acoustic reflexes were tested and absent at 500Hz, 1000Hz, 2000Hz, and 4000Hz bilaterally.    AUDIOMETRIC TESTING  Pure tone audiometry conducted via insert headphones from 125 Hz - 8000 Hz with good reliability was consistent with primarily flat moderate sensorineural hearing loss.     SPEECH RECOGNITION TESTING (SRT)  SRT was in agreement with pure tone averages bilaterally ( Right: 40 dB HL, Left: 40 dB HL).    WORD RECOGNITION SCORE (WRS)  WRS was (100%) in the right ear and (96%) in the left ear using recorded ordered by difficulty NU6 word list.    IMPRESSIONS:  The results of today's assessment after consistent with normal tympanograms, absent acoustic reflexes, sensorineural hearing loss bilaterally. The patient was counseled with regard to the findings. She is a good hearing aid candidate but is not interested at this time. Should she become interested she should contact [facility] and determine in network provider and benefit.      RECOMMENDATIONS:  Treatment Plan:.  Follow up with ENT/PCP as recommended.  Annual hearing assessments as recommended. Follow up sooner if patient feels hearing or symptoms have changed.  Contact the clinic with  questions or concerns at [phone].     PATIENT EDUCATION:   Discussed results and recommendations with patient.  Questions were addressed and the patient was encouraged to contact our department should concerns arise.      Keeley Swenson, CCC-A, [facility]  Licensed Audiologist

## 2024-01-15 ENCOUNTER — APPOINTMENT (OUTPATIENT)
Dept: HEMATOLOGY/ONCOLOGY | Facility: HOSPITAL | Age: 89
End: 2024-01-15
Payer: MEDICARE

## 2024-01-18 ENCOUNTER — OFFICE VISIT (OUTPATIENT)
Dept: HEMATOLOGY/ONCOLOGY | Facility: HOSPITAL | Age: 89
End: 2024-01-18
Payer: MEDICARE

## 2024-01-18 ENCOUNTER — TELEPHONE (OUTPATIENT)
Dept: HEMATOLOGY/ONCOLOGY | Facility: HOSPITAL | Age: 89
End: 2024-01-18

## 2024-01-18 ENCOUNTER — LAB (OUTPATIENT)
Dept: LAB | Facility: HOSPITAL | Age: 89
End: 2024-01-18
Payer: MEDICARE

## 2024-01-18 VITALS
HEART RATE: 63 BPM | BODY MASS INDEX: 28.42 KG/M2 | DIASTOLIC BLOOD PRESSURE: 51 MMHG | OXYGEN SATURATION: 96 % | RESPIRATION RATE: 20 BRPM | TEMPERATURE: 97.9 F | HEIGHT: 64 IN | WEIGHT: 166.45 LBS | SYSTOLIC BLOOD PRESSURE: 133 MMHG

## 2024-01-18 DIAGNOSIS — I50.32 CHRONIC HEART FAILURE WITH PRESERVED EJECTION FRACTION (MULTI): Primary | ICD-10-CM

## 2024-01-18 DIAGNOSIS — E61.1 IRON DEFICIENCY: ICD-10-CM

## 2024-01-18 DIAGNOSIS — D50.0 IRON DEFICIENCY ANEMIA DUE TO CHRONIC BLOOD LOSS: ICD-10-CM

## 2024-01-18 DIAGNOSIS — I50.32 CHRONIC DIASTOLIC CONGESTIVE HEART FAILURE (MULTI): ICD-10-CM

## 2024-01-18 DIAGNOSIS — N18.9 ANEMIA OF RENAL DISEASE: ICD-10-CM

## 2024-01-18 DIAGNOSIS — D63.1 ANEMIA OF RENAL DISEASE: ICD-10-CM

## 2024-01-18 LAB
BASOPHILS # BLD AUTO: 0.02 X10*3/UL (ref 0–0.1)
BASOPHILS NFR BLD AUTO: 0.5 %
EOSINOPHIL # BLD AUTO: 0.1 X10*3/UL (ref 0–0.4)
EOSINOPHIL NFR BLD AUTO: 2.4 %
ERYTHROCYTE [DISTWIDTH] IN BLOOD BY AUTOMATED COUNT: 14.1 % (ref 11.5–14.5)
FERRITIN SERPL-MCNC: 92 NG/ML (ref 8–150)
HCT VFR BLD AUTO: 32.2 % (ref 36–46)
HGB BLD-MCNC: 10.1 G/DL (ref 12–16)
HGB RETIC QN: 31 PG (ref 28–38)
HOLD SPECIMEN: NORMAL
IMM GRANULOCYTES # BLD AUTO: 0.01 X10*3/UL (ref 0–0.5)
IMM GRANULOCYTES NFR BLD AUTO: 0.2 % (ref 0–0.9)
IMMATURE RETIC FRACTION: 14.5 %
IRON SATN MFR SERPL: 34 % (ref 25–45)
IRON SERPL-MCNC: 99 UG/DL (ref 35–150)
LYMPHOCYTES # BLD AUTO: 1.15 X10*3/UL (ref 0.8–3)
LYMPHOCYTES NFR BLD AUTO: 27.6 %
MCH RBC QN AUTO: 31 PG (ref 26–34)
MCHC RBC AUTO-ENTMCNC: 31.4 G/DL (ref 32–36)
MCV RBC AUTO: 99 FL (ref 80–100)
MONOCYTES # BLD AUTO: 0.53 X10*3/UL (ref 0.05–0.8)
MONOCYTES NFR BLD AUTO: 12.7 %
NEUTROPHILS # BLD AUTO: 2.35 X10*3/UL (ref 1.6–5.5)
NEUTROPHILS NFR BLD AUTO: 56.6 %
NRBC BLD-RTO: 0 /100 WBCS (ref 0–0)
PLATELET # BLD AUTO: 163 X10*3/UL (ref 150–450)
RBC # BLD AUTO: 3.26 X10*6/UL (ref 4–5.2)
RETICS #: 0.05 X10*6/UL (ref 0.02–0.11)
RETICS/RBC NFR AUTO: 1.6 % (ref 0.5–2)
TIBC SERPL-MCNC: 290 UG/DL (ref 240–445)
UIBC SERPL-MCNC: 191 UG/DL (ref 110–370)
WBC # BLD AUTO: 4.2 X10*3/UL (ref 4.4–11.3)

## 2024-01-18 PROCEDURE — 83540 ASSAY OF IRON: CPT

## 2024-01-18 PROCEDURE — 36415 COLL VENOUS BLD VENIPUNCTURE: CPT

## 2024-01-18 PROCEDURE — 85025 COMPLETE CBC W/AUTO DIFF WBC: CPT

## 2024-01-18 PROCEDURE — 1036F TOBACCO NON-USER: CPT | Performed by: STUDENT IN AN ORGANIZED HEALTH CARE EDUCATION/TRAINING PROGRAM

## 2024-01-18 PROCEDURE — 3078F DIAST BP <80 MM HG: CPT | Performed by: STUDENT IN AN ORGANIZED HEALTH CARE EDUCATION/TRAINING PROGRAM

## 2024-01-18 PROCEDURE — 1125F AMNT PAIN NOTED PAIN PRSNT: CPT | Performed by: STUDENT IN AN ORGANIZED HEALTH CARE EDUCATION/TRAINING PROGRAM

## 2024-01-18 PROCEDURE — 99214 OFFICE O/P EST MOD 30 MIN: CPT | Performed by: STUDENT IN AN ORGANIZED HEALTH CARE EDUCATION/TRAINING PROGRAM

## 2024-01-18 PROCEDURE — 3075F SYST BP GE 130 - 139MM HG: CPT | Performed by: STUDENT IN AN ORGANIZED HEALTH CARE EDUCATION/TRAINING PROGRAM

## 2024-01-18 PROCEDURE — 82728 ASSAY OF FERRITIN: CPT

## 2024-01-18 PROCEDURE — 1159F MED LIST DOCD IN RCRD: CPT | Performed by: STUDENT IN AN ORGANIZED HEALTH CARE EDUCATION/TRAINING PROGRAM

## 2024-01-18 PROCEDURE — 85045 AUTOMATED RETICULOCYTE COUNT: CPT

## 2024-01-18 RX ORDER — FAMOTIDINE 10 MG/ML
20 INJECTION INTRAVENOUS ONCE AS NEEDED
Status: CANCELLED | OUTPATIENT
Start: 2024-01-24

## 2024-01-18 RX ORDER — DIPHENHYDRAMINE HYDROCHLORIDE 50 MG/ML
50 INJECTION INTRAMUSCULAR; INTRAVENOUS AS NEEDED
Status: CANCELLED | OUTPATIENT
Start: 2024-01-24

## 2024-01-18 RX ORDER — EPINEPHRINE 0.3 MG/.3ML
0.3 INJECTION SUBCUTANEOUS EVERY 5 MIN PRN
Status: CANCELLED | OUTPATIENT
Start: 2024-01-24

## 2024-01-18 RX ORDER — ALBUTEROL SULFATE 0.83 MG/ML
3 SOLUTION RESPIRATORY (INHALATION) AS NEEDED
Status: CANCELLED | OUTPATIENT
Start: 2024-01-24

## 2024-01-18 ASSESSMENT — PAIN SCALES - GENERAL: PAINLEVEL: 6

## 2024-01-18 NOTE — TELEPHONE ENCOUNTER
RN attempted to call patient but there was no answer. RN left message on voicemail to call the office back.

## 2024-01-18 NOTE — PROGRESS NOTES
Patient ID: Raegan Ramos is a 88 y.o. female.  Referring Physician: Enmanuel Walton MD  [id number] [place] [address]  Primary Care Provider: An Jones MD  Visit Type: Follow Up  Diagnosis: iron deficiency anemia       Subjective    HPI  88 y.o. female with a PMH significant for hypertension, history of headaches, DM, carpal tunnel syndrome, spinal cord stimulator, adult onset asthma, moderate aortic stenosis, and breast cancer who is followed for anemia.      Chart review shows mild but progressive anemia since 2020, recently was evaluated in [place] for Hb 6. Has received intermittent PO iron for the anemia in the past, last many years back. May '23 had a stool occult EIA which was negative. s/p hysterectomy  in her 30s for fibroids. Iron labs show deficiency, and anemia of inflammation, SPEP was negative in May '23.   Last colo was 2yrs back was WNL. Reports no bleeding. She is currently on ASA 81mg and had DVT in soleal vein in April '23 has been on apixaban since then.   Has active breast cancer not planning surgery 2/2 heart disease and age.   Has difficulty swallowing and abdominal pain after eating since about 1yr. Had a barium study which showed mild transient esophageal spasms and no gastroparesis. Has no particular symptoms related to her anemia.  Age appropriate cancer screening: Last colo was 2yrs back was WNL  [facility]: No personal h/o cancer. [facility] sister had colon cancer in her 50s. No blood disease.   Social history: Never smoker, no current ETOH, no RDA. No exposure to CT/RT/toxins.   Was treated with ?2 doses of ferumoxytol last in Oct '23.   01/18/24: generally feeling better except for last 1-2 weeks, continue to walk 1-2 miles per day. Is on ASA from cardiology, has HF. Also on 2.5mg BID of apixaban.     Review of Systems - Oncology  10 point review of systems negative except as stated in HPI    Objective   Past Surgical History:   Procedure Laterality Date    BACK SURGERY  " 10/11/2022    Back Surgery    CT ANGIO CORONARY ART WITH HEARTFLOW IF SCORE >30%  8/7/2023    CT HEART CORONARY ANGIOGRAM 8/7/2023 U CT    HYSTERECTOMY  11/16/2017    Hysterectomy    OTHER SURGICAL HISTORY  10/14/2016    Dental Surgery    TOTAL KNEE ARTHROPLASTY  10/11/2022    Knee Replacement     Oncology History    No history exists.       Physical Exam  BSA: 1.85 meters squared  /51 (BP Location: Left arm, Patient Position: Sitting, BP Cuff Size: Adult)   Pulse 63   Temp 36.6 °C (97.9 °F) (Temporal)   Resp 20   Ht (S) 1.629 m (5' 4.13\")   Wt 75.5 kg (166 lb 7.2 oz)   LMP  (LMP Unknown)   SpO2 96%   BMI 28.45 kg/m²     Labs:  Lab Results   Component Value Date    WBC 5.0 10/12/2023    NEUTROABS 2.22 09/28/2023    LYMPHSABS 0.88 09/28/2023    MONOSABS 0.45 09/28/2023    EOSABS 0.10 09/28/2023    BASOSABS 0.02 09/28/2023    RBC 3.13 (L) 10/12/2023     (H) 10/12/2023    MCHC 29.5 (L) 10/12/2023    HGB 9.5 (L) 10/12/2023    HCT 32.2 (L) 10/12/2023     10/12/2023     Lab Results   Component Value Date    RETICCTPCT 2.6 (H) 09/28/2023      Lab Results   Component Value Date    CREATININE 1.10 (H) 11/27/2023    BUN 21 11/27/2023    EGFR 49 (L) 11/27/2023     11/27/2023    K 4.3 11/27/2023     11/27/2023    CO2 29 11/27/2023      Lab Results   Component Value Date    ALT 6 (L) 11/27/2023    AST 10 11/27/2023    ALKPHOS 56 11/27/2023    BILITOT 0.3 11/27/2023      Lab Results   Component Value Date    TSH CANCELED 08/05/2023     Lab Results   Component Value Date    TSH CANCELED 08/05/2023     Lab Results   Component Value Date    IRON 70 09/25/2023    TIBC 274 09/25/2023    FERRITIN 77 09/28/2023      Lab Results   Component Value Date    MQLBMPUG73 500 08/10/2023      Lab Results   Component Value Date    FOLATE 11.5 08/10/2023     Lab Results   Component Value Date    JOSELO NEGATIVE 07/25/2022    RF <10 12/04/2017    SEDRATE 37 (H) 12/04/2017      Lab Results   Component Value " "Date    CRP 0.38 12/04/2017      No results found for: \"FRIDA\"  Lab Results   Component Value Date     08/10/2023     Lab Results   Component Value Date    HAPTOGLOBIN 173 08/10/2023     Lab Results   Component Value Date    SPEP NORMAL 09/28/2023     Lab Results   Component Value Date     02/05/2020        No components found for: \"PT\"  aPTT   Date/Time Value Ref Range Status   06/20/2022 03:18 PM 35 26 - 39 sec Final     Comment:       THE APTT IS NO LONGER USED FOR MONITORING     UNFRACTIONATED HEPARIN THERAPY.    FOR MONITORING HEPARIN THERAPY,     USE THE HEPARIN ASSAY.         Assessment/Plan    88 y.o. female with a PMH significant for hypertension, history of headaches, DM, carpal tunnel syndrome, spinal cord stimulator, adult onset asthma, moderate  aortic stenosis/moderate AR, MR, TR and significantly increased pulmonary artery HTN since ECHO in '22, and breast cancer who is referred for evaluation of anemia.     # Anemia: had anemia for last 2-3 years, NCHC, not needing transfusion in the recent past. Has received PO iron last 5yrs back, no interventions since. Iron labs show deficiency, and  anemia of inflammation, SPEP was negative in May '23. Currently on ASA and Apixaban for a DVT. Has had age appropriate cancer screening and has untreated breast cancer, local disease at present. Work up in 8/23 showed CKD with relatively low EPO response for degree of anemia, LFTs, elevated Ca. Mild NCHC anemia with hypoproliferative retic response. Relatively low iron level, in relation to the expected degree of inflammation with comorbidities and heart failure. Normal  TSH, B12 and folate are WNL.  Hemolysis labs are negative. Fecal occult blood negative. Had myeloma labs in '22 which were negative, negative on repeat as well.    She received a course of IV ferumoxytol ending on Oct '23.   Plan:   - recheck labs today and will advise on follow up accordingly     Addendum: not had a significant " improvement in iron panel nor H/H, will dose 1 more ferumoxytol. And recheck labs in 2 months.     Enmanuel Oh MD

## 2024-01-19 RX ORDER — ISOSORBIDE MONONITRATE 30 MG/1
30 TABLET, EXTENDED RELEASE ORAL DAILY
Qty: 90 TABLET | Refills: 3 | Status: SHIPPED | OUTPATIENT
Start: 2024-01-19 | End: 2024-02-06 | Stop reason: SDUPTHER

## 2024-01-23 ENCOUNTER — LAB (OUTPATIENT)
Dept: LAB | Facility: LAB | Age: 89
End: 2024-01-23
Payer: MEDICARE

## 2024-01-23 DIAGNOSIS — I50.30 HEART FAILURE WITH PRESERVED EJECTION FRACTION, UNSPECIFIED HF CHRONICITY (MULTI): ICD-10-CM

## 2024-01-23 LAB
ALBUMIN SERPL BCP-MCNC: 3.8 G/DL (ref 3.4–5)
ALP SERPL-CCNC: 49 U/L (ref 33–136)
ALT SERPL W P-5'-P-CCNC: 6 U/L (ref 7–45)
AMYLASE SERPL-CCNC: 32 U/L (ref 29–103)
ANION GAP SERPL CALC-SCNC: 12 MMOL/L (ref 10–20)
AST SERPL W P-5'-P-CCNC: 10 U/L (ref 9–39)
BILIRUB SERPL-MCNC: 0.5 MG/DL (ref 0–1.2)
BUN SERPL-MCNC: 17 MG/DL (ref 6–23)
CALCIUM SERPL-MCNC: 9.7 MG/DL (ref 8.6–10.3)
CHLORIDE SERPL-SCNC: 102 MMOL/L (ref 98–107)
CO2 SERPL-SCNC: 29 MMOL/L (ref 21–32)
CREAT SERPL-MCNC: 1.13 MG/DL (ref 0.5–1.05)
EGFRCR SERPLBLD CKD-EPI 2021: 47 ML/MIN/1.73M*2
ERYTHROCYTE [DISTWIDTH] IN BLOOD BY AUTOMATED COUNT: 13.8 % (ref 11.5–14.5)
GLUCOSE SERPL-MCNC: 119 MG/DL (ref 74–99)
HCT VFR BLD AUTO: 34.9 % (ref 36–46)
HGB BLD-MCNC: 11 G/DL (ref 12–16)
LIPASE SERPL-CCNC: <3 U/L (ref 9–82)
MCH RBC QN AUTO: 31.3 PG (ref 26–34)
MCHC RBC AUTO-ENTMCNC: 31.5 G/DL (ref 32–36)
MCV RBC AUTO: 99 FL (ref 80–100)
NRBC BLD-RTO: 0 /100 WBCS (ref 0–0)
PLATELET # BLD AUTO: 181 X10*3/UL (ref 150–450)
POTASSIUM SERPL-SCNC: 4.2 MMOL/L (ref 3.5–5.3)
PROT SERPL-MCNC: 6.3 G/DL (ref 6.4–8.2)
RBC # BLD AUTO: 3.52 X10*6/UL (ref 4–5.2)
SODIUM SERPL-SCNC: 139 MMOL/L (ref 136–145)
TSH SERPL-ACNC: 3.03 MIU/L (ref 0.44–3.98)
WBC # BLD AUTO: 4 X10*3/UL (ref 4.4–11.3)

## 2024-01-23 PROCEDURE — 85027 COMPLETE CBC AUTOMATED: CPT

## 2024-01-23 PROCEDURE — 36415 COLL VENOUS BLD VENIPUNCTURE: CPT

## 2024-01-23 PROCEDURE — 82150 ASSAY OF AMYLASE: CPT

## 2024-01-23 PROCEDURE — 83690 ASSAY OF LIPASE: CPT

## 2024-01-23 PROCEDURE — 84443 ASSAY THYROID STIM HORMONE: CPT

## 2024-01-23 PROCEDURE — 80053 COMPREHEN METABOLIC PANEL: CPT

## 2024-01-24 DIAGNOSIS — I50.30 HEART FAILURE WITH PRESERVED EJECTION FRACTION, UNSPECIFIED HF CHRONICITY (MULTI): ICD-10-CM

## 2024-01-24 RX ORDER — HYDRALAZINE HYDROCHLORIDE 10 MG/1
10 TABLET, FILM COATED ORAL 3 TIMES DAILY
Qty: 270 TABLET | Refills: 3 | Status: SHIPPED | OUTPATIENT
Start: 2024-01-24 | End: 2024-02-06 | Stop reason: SDUPTHER

## 2024-01-25 ENCOUNTER — APPOINTMENT (OUTPATIENT)
Dept: RADIOLOGY | Facility: CLINIC | Age: 89
End: 2024-01-25
Payer: MEDICARE

## 2024-01-25 ENCOUNTER — APPOINTMENT (OUTPATIENT)
Dept: SURGICAL ONCOLOGY | Facility: CLINIC | Age: 89
End: 2024-01-25
Payer: MEDICARE

## 2024-01-29 DIAGNOSIS — I50.9 CHRONIC CONGESTIVE HEART FAILURE, UNSPECIFIED HEART FAILURE TYPE (MULTI): ICD-10-CM

## 2024-01-29 DIAGNOSIS — I82.432 ACUTE DEEP VEIN THROMBOSIS (DVT) OF POPLITEAL VEIN OF LEFT LOWER EXTREMITY (MULTI): ICD-10-CM

## 2024-01-30 RX ORDER — APIXABAN 5 MG/1
5 TABLET, FILM COATED ORAL 2 TIMES DAILY
Qty: 180 TABLET | Refills: 0 | Status: SHIPPED | OUTPATIENT
Start: 2024-01-30 | End: 2024-05-27

## 2024-01-30 RX ORDER — CARVEDILOL 25 MG/1
25 TABLET ORAL
Qty: 180 TABLET | Refills: 0 | Status: SHIPPED | OUTPATIENT
Start: 2024-01-30 | End: 2024-02-06 | Stop reason: SDUPTHER

## 2024-01-31 DIAGNOSIS — K21.9 GASTROESOPHAGEAL REFLUX DISEASE WITHOUT ESOPHAGITIS: ICD-10-CM

## 2024-02-01 RX ORDER — PANTOPRAZOLE SODIUM 40 MG/1
40 TABLET, DELAYED RELEASE ORAL DAILY
Qty: 90 TABLET | Refills: 0 | Status: SHIPPED | OUTPATIENT
Start: 2024-02-01 | End: 2024-05-27

## 2024-02-06 ENCOUNTER — OFFICE VISIT (OUTPATIENT)
Dept: CARDIOLOGY | Facility: CLINIC | Age: 89
End: 2024-02-06
Payer: MEDICARE

## 2024-02-06 VITALS
BODY MASS INDEX: 26.66 KG/M2 | OXYGEN SATURATION: 99 % | SYSTOLIC BLOOD PRESSURE: 118 MMHG | HEIGHT: 65 IN | DIASTOLIC BLOOD PRESSURE: 78 MMHG | HEART RATE: 83 BPM | WEIGHT: 160 LBS | RESPIRATION RATE: 16 BRPM

## 2024-02-06 DIAGNOSIS — I50.30 HEART FAILURE WITH PRESERVED EJECTION FRACTION, UNSPECIFIED HF CHRONICITY (MULTI): Primary | ICD-10-CM

## 2024-02-06 DIAGNOSIS — I50.22 ACC/AHA STAGE C CHRONIC SYSTOLIC HEART FAILURE (MULTI): ICD-10-CM

## 2024-02-06 DIAGNOSIS — R41.3 MEMORY LOSS: ICD-10-CM

## 2024-02-06 DIAGNOSIS — I50.32 CHRONIC DIASTOLIC CONGESTIVE HEART FAILURE (MULTI): ICD-10-CM

## 2024-02-06 DIAGNOSIS — I50.9 CHRONIC CONGESTIVE HEART FAILURE, UNSPECIFIED HEART FAILURE TYPE (MULTI): ICD-10-CM

## 2024-02-06 DIAGNOSIS — I10 ESSENTIAL (PRIMARY) HYPERTENSION: ICD-10-CM

## 2024-02-06 DIAGNOSIS — E78.2 MIXED HYPERLIPIDEMIA: ICD-10-CM

## 2024-02-06 PROCEDURE — 99215 OFFICE O/P EST HI 40 MIN: CPT | Performed by: STUDENT IN AN ORGANIZED HEALTH CARE EDUCATION/TRAINING PROGRAM

## 2024-02-06 PROCEDURE — 1126F AMNT PAIN NOTED NONE PRSNT: CPT | Performed by: STUDENT IN AN ORGANIZED HEALTH CARE EDUCATION/TRAINING PROGRAM

## 2024-02-06 PROCEDURE — 1157F ADVNC CARE PLAN IN RCRD: CPT | Performed by: STUDENT IN AN ORGANIZED HEALTH CARE EDUCATION/TRAINING PROGRAM

## 2024-02-06 PROCEDURE — 1036F TOBACCO NON-USER: CPT | Performed by: STUDENT IN AN ORGANIZED HEALTH CARE EDUCATION/TRAINING PROGRAM

## 2024-02-06 PROCEDURE — 3074F SYST BP LT 130 MM HG: CPT | Performed by: STUDENT IN AN ORGANIZED HEALTH CARE EDUCATION/TRAINING PROGRAM

## 2024-02-06 PROCEDURE — 1159F MED LIST DOCD IN RCRD: CPT | Performed by: STUDENT IN AN ORGANIZED HEALTH CARE EDUCATION/TRAINING PROGRAM

## 2024-02-06 PROCEDURE — 3078F DIAST BP <80 MM HG: CPT | Performed by: STUDENT IN AN ORGANIZED HEALTH CARE EDUCATION/TRAINING PROGRAM

## 2024-02-06 RX ORDER — ISOSORBIDE MONONITRATE 30 MG/1
30 TABLET, EXTENDED RELEASE ORAL DAILY
Qty: 90 TABLET | Refills: 3 | Status: SHIPPED | OUTPATIENT
Start: 2024-02-06 | End: 2024-05-27

## 2024-02-06 RX ORDER — HYDRALAZINE HYDROCHLORIDE 10 MG/1
10 TABLET, FILM COATED ORAL 3 TIMES DAILY
Qty: 270 TABLET | Refills: 3 | Status: SHIPPED | OUTPATIENT
Start: 2024-02-06 | End: 2024-05-27

## 2024-02-06 RX ORDER — PRAVASTATIN SODIUM 20 MG/1
20 TABLET ORAL NIGHTLY
Qty: 90 TABLET | Refills: 3 | Status: SHIPPED | OUTPATIENT
Start: 2024-02-06 | End: 2024-05-27

## 2024-02-06 RX ORDER — DAPAGLIFLOZIN 10 MG/1
10 TABLET, FILM COATED ORAL DAILY
Qty: 30 TABLET | Refills: 11 | Status: SHIPPED | OUTPATIENT
Start: 2024-02-06 | End: 2024-02-08 | Stop reason: ALTCHOICE

## 2024-02-06 RX ORDER — SPIRONOLACTONE 25 MG/1
25 TABLET ORAL 2 TIMES DAILY
Qty: 90 TABLET | Refills: 3 | Status: SHIPPED | OUTPATIENT
Start: 2024-02-06 | End: 2024-04-16

## 2024-02-06 RX ORDER — SACUBITRIL AND VALSARTAN 97; 103 MG/1; MG/1
1 TABLET, FILM COATED ORAL 2 TIMES DAILY
Qty: 60 TABLET | Refills: 11 | Status: SHIPPED | OUTPATIENT
Start: 2024-02-06 | End: 2024-05-27

## 2024-02-06 RX ORDER — CARVEDILOL 25 MG/1
25 TABLET ORAL 2 TIMES DAILY
Qty: 180 TABLET | Refills: 3 | Status: SHIPPED | OUTPATIENT
Start: 2024-02-06 | End: 2024-05-27

## 2024-02-06 ASSESSMENT — ENCOUNTER SYMPTOMS
DYSPNEA ON EXERTION: 1
WEIGHT LOSS: 0
COUGH: 0
HEMATEMESIS: 0
SYNCOPE: 0
FOCAL WEAKNESS: 0
HEMATOCHEZIA: 0
MEMORY LOSS: 1
DIZZINESS: 0
PND: 0
ALTERED MENTAL STATUS: 0
HEMATURIA: 0
ORTHOPNEA: 1
DEPRESSION: 1
OCCASIONAL FEELINGS OF UNSTEADINESS: 1
DECREASED APPETITE: 0
MYALGIAS: 1
WEIGHT GAIN: 0
LOSS OF SENSATION IN FEET: 1
WEAKNESS: 0
PALPITATIONS: 0

## 2024-02-06 ASSESSMENT — PAIN SCALES - GENERAL: PAINLEVEL: 0-NO PAIN

## 2024-02-06 ASSESSMENT — PATIENT HEALTH QUESTIONNAIRE - PHQ9
SUM OF ALL RESPONSES TO PHQ9 QUESTIONS 1 & 2: 2
2. FEELING DOWN, DEPRESSED OR HOPELESS: SEVERAL DAYS
10. IF YOU CHECKED OFF ANY PROBLEMS, HOW DIFFICULT HAVE THESE PROBLEMS MADE IT FOR YOU TO DO YOUR WORK, TAKE CARE OF THINGS AT HOME, OR GET ALONG WITH OTHER PEOPLE: NOT DIFFICULT AT ALL
1. LITTLE INTEREST OR PLEASURE IN DOING THINGS: SEVERAL DAYS

## 2024-02-06 ASSESSMENT — LIFESTYLE VARIABLES: SUBSTANCE_ABUSE: 0

## 2024-02-06 NOTE — PATIENT INSTRUCTIONS
Thank you for coming in today. If you have any questions or concerns, you may call the Heart Failure Office at [phone] option 6, or [phone].  You may also contact our heart failure nursing team via email on [email or web address].    For quicker results set-up your  [employer] account to receive results and other correspondence directly to your phone.    Please bring all your pills/medications to your Cardiology appointments.    **  -We will renew your heart medications today    - Please make the following medication changes:  1. DECREASE Pravastatin to 20 mg once a day at night      - Please have the following tests done:  1.Blood tests just before your next visit (RFP, BNP)    2.  Echocardiogram.  We can schedule this for you today before you leave, or you may CALL  Tel [phone]   OR    [phone] to schedule      You will be referred to the following teams, CALL  [phone] to schedule your appointments with:  1. Geriatrics ( neurocognitive assessment, to assess subjective memory loss)    - Please make an appointment to be seen in 6 months

## 2024-02-06 NOTE — PROGRESS NOTES
Chief Complaint    Ambulatory heart failure care      History of Present Illness    Referring Clinician: Dr. 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 She has a past medical history significant for  ductal breast cancer not for surgical intervention, hypertension, history of headaches,DM, carpal tunnel syndrome, spinal cord stimulator, adult onset asthma, moderate aortic stenosis (?LFLG) and on TTE 6/2020 2P/M 32/18 with DI 0.30.  On TTE 6/2022 LVEF 45-50% with left ventricular thickening LVIDD 4.9 cm, minor RWMA, normal right ventricular systolic function. She was hospitalized 6/2022 with symptoms concerning for stroke, stroke was ruled out. 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 She was initiated on some heart failure pharmacotherapy. Since her last visit she has completed nuclear pharmacological stress evaluation 8/2022 which was NEGATIVE for inducible ischemia.Technetium pyrophosphate scan 2/2023 was NEGATIVE for cardiac amyloidosis and amyloidosis labs are wnl.  She was admitted 8/2023 with chest pain and CTA cors showed CAC 1099.5 with NOCAD which is being medically managed.  She is unable to get cardiac MRI due to the presence of a neurostimulator in her back.    Ms. Raoms tells me that she has decided against chemotherapy, and was told that she is high risk for breast cancer surgery.    Today, she complains she has been having short-term memory loss for at least the past month and thinks it is progressive.    Symptomatically she denies chest pain or dyspnea at rest.  She does have mild exertional dyspnea when she walks 3 miles which she does most days (monitored by her Fitbit).  Every day, she walks at least 1 to 2 miles without exertional symptoms.  She is able to do all household chores.  She denies PND, has a stable 1 pillow orthopnea.  She does not have leg swelling often but when it occurs it is at the end  of the day and resolves by the morning.  She does complain today of diffuse myalgia, which revealed chronic symptoms per her report.    Surgical Hx:  -Back surgery  -Spinal cord stimulator  -Knee surgery ( left)     Past Obstetric Hx:  - P 3  - No cardiac complications of pregnancy    Social Hx:  - Smoking- never   - ETOH- never   - Illicit drugs- never   - Lives with son, feels safe   -     Family Hx:  Specifically, there is no family history of ICD, PPM, LVAD, OHT, arrhythmias, CVA, or sudden cardiac death.    Mother - CHF, CVA   Sister and brother- heart disease ? diagnoses   Brother - MI  Daughter - heart disease ? diagnosis     Medication reconciliation completed, see below.     Investigations:    The electronic medical record has been reviewed by me for salient history. All cardiovascular imaging and testing available in the electronic medical record, and [employer] has been reviewed.      Medication Documentation Review Audit       Reviewed by Radha Richmond RN (Registered Nurse) on 02/06/24 at 0932      Medication Order Taking? Sig Documenting Provider Last Dose Status   acetaminophen (Tylenol 8 HOUR) 650 mg ER tablet [id number] Yes Take 2 tablets (1,300 mg) by mouth once daily. Historical Provider, MD Taking Active   albuterol 90 mcg/actuation inhaler [id number] Yes INHALE 2 INHALATIONS BY MOUTH  EVERY 6 HOURS IF NEEDED FOR  WHEEZING An Jones MD Taking Active   anastrozole (Arimidex) 1 mg tablet [id number] Yes  Historical Provider, MD Taking Active   apixaban (Eliquis) 5 mg tablet [id number] Yes TAKE 1 TABLET BY MOUTH TWICE  DAILY An Jones MD Taking Active   Discontinued 02/06/24 0930   budesonide-formoteroL (Symbicort) 80-4.5 mcg/actuation inhaler [id number] Yes Inhale 2 puffs 2 times a day. RINSE MOUTH AFTER USE. As needed Historical Provider, MD Taking Active   calcium carbonate-vit D3-min 600 mg calcium- 400 unit tablet [id number] Yes Take 1 tablet by mouth in the morning and 1  tablet before bedtime. Historical Provider, MD Taking Active   carvedilol (Coreg) 25 mg tablet [id number] Yes TAKE 1 TABLET BY MOUTH TWICE  DAILY WITH MEALS An Jones MD Taking Active   dapagliflozin propanediol (Farxiga) 10 mg [id number] Yes Take 1 tablet (10 mg) by mouth once daily. Sarahi Garcia MD PhD Taking Active   DULoxetine (Cymbalta) 30 mg DR capsule [id number] Yes Take 3 capsules (90 mg) by mouth once daily. An Jones MD Taking Active   Entresto  mg tablet [id number] Yes Take 1 tablet by mouth 2 times a day. Sarahi Garcia MD PhD Taking Active   hydrALAZINE (Apresoline) 10 mg tablet [id number] Yes Take 1 tablet (10 mg) by mouth 3 times a day. Sarahi Garcia MD PhD Taking Active   isosorbide mononitrate ER (Imdur) 30 mg 24 hr tablet [id number] Yes Take 1 tablet (30 mg) by mouth once daily. Sarahi Garcia MD PhD Taking Active   metFORMIN (Glucophage) 500 mg tablet [id number] Yes Take 1 tablet (500 mg) by mouth 2 times a day. An Jones MD Taking Active   OneTouch Ultra Test strip [id number] Yes USE TO TEST TWICE DAILY AS DIRECTED Historical Provider, MD Taking Active     Discontinued 02/01/24 0405   pantoprazole (ProtoNix) 40 mg EC tablet [id number] Yes TAKE 1 TABLET BY MOUTH ONCE  DAILY An Jones MD Taking Active   pravastatin (Pravachol) 40 mg tablet [id number] Yes Take 1 tablet (40 mg) by mouth once daily. Historical Provider, MD Taking Active   spironolactone (Aldactone) 25 mg tablet [id number] Yes TAKE 1 TABLET BY MOUTH TWICE  DAILY An Jones MD Taking Active                   Review of Systems    Review of Systems   Constitutional: Negative for decreased appetite, weight gain and weight loss.   HENT:  Positive for hearing loss.    Eyes:  Negative for visual disturbance.   Cardiovascular:  Positive for dyspnea on exertion, leg swelling and orthopnea. Negative for chest pain, palpitations, paroxysmal nocturnal dyspnea and  "syncope.   Respiratory:  Negative for cough.    Skin:  Negative for rash.   Musculoskeletal:  Positive for myalgias.   Gastrointestinal:  Negative for hematemesis, hematochezia and melena.   Genitourinary:  Negative for hematuria.   Neurological:  Negative for dizziness, focal weakness and weakness.   Psychiatric/Behavioral:  Positive for memory loss (\" short term I forget things\"). Negative for altered mental status and substance abuse.          Visit Vitals  /78   Pulse 83   Resp 16   Ht 1.651 m (5' 5\")   Wt 72.6 kg (160 lb)   LMP  (LMP Unknown)   SpO2 99%   BMI 26.63 kg/m²   OB Status Postmenopausal   Smoking Status Never   BSA 1.82 m²        Physical Exam  On examination:    Very pleasant elderly AA woman in no apparent CP or painful distress. Looks younger than stated age   Immaculately groomed   Neck: No JVD or HJR  CVS: HS 1,2. Gd 3 harsh ESM to carotids, PSM at LLSE accentuated with expiration  Resp: CTA bilaterally. 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 She has a past medical history significant for ductal breast cancer diagnosed 2023 not for surgical intervention and she has declined chemotherapy, hypertension, history of headaches,DM, iron deficiency anemia, carpal tunnel syndrome, spinal cord stimulator, adult onset asthma, moderate aortic stenosis  and on TTE 6/2020 2P/M 32/18 with DI 0.30.  On TTE 6/2022 LVEF 45-50% " with left ventricular thickening LVIDD 4.9 cm, minor RWMA, normal right ventricular systolic function. She was hospitalized 6/2022 with symptoms concerning for stroke, stroke was ruled out. 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 She was initiated on some heart failure pharmacotherapy. On nuclear pharmacological stress evaluation 8/2022 which was NEGATIVE for inducible ischemia.Technetium pyrophosphate scan 2/2023 was NEGATIVE for cardiac amyloidosis and amyloidosis labs are wnl.  She is tolerating Hf GDMT  She was admitted 8/2023 with chest pain and CTA cors showed CAC 1099.5 with NOCAD which is being medically managed.  The presence of a neurostimulator in her back precludes her being able to get cardiac MRI    NYHA Functional Class: 3  ACC/AHA Stage C heart failure  Volume status: Euvolemic  Perfusion status: warm to touch  Aetiology: ? valvular TBD    PLAN:    #HFmrEF    -Heart failure pharmacotherapy will be modified as below  -Labs: RFP, BNP  and TTE before next visit  -Heart failure lifestyle modifications discussed and Qs answered.     -Medication optimisation, hypertensive today:  BB: Continue carvedilol 25 mg twice daily  RAASi: Continue sacubitril/valsartan 97/103 mg twice daily  AA: Continue spironolactone 25 mg once daily, and will continue to hold supplemental potassium  SGLT2i: Continue dapagliflozin 10 mg once daily.  ( Did not tolerate Empagliflozin)  Hydralazine: 10 mg 3 times daily, adherence encouraged  Isosorbide mononitrate: Continue 30 mg once daily  CCB: Continue to hold amlodipine (no further leg swelling with its discontinuation)  Torsemide:  has not been using    #Moderate aortic stenosis   -Has been reviewed by the heart valve team  - Annual TTE, next before next visit    #Mitral regurgitation  We will evaluate severity with next TTE     #Memory loss  Referred to geriatrics for neurocognitive testing    This note was transcribed using the Dragon Dictation  system. There may be grammatical, punctuation, or verbiage errors that can occur with voice recognition programs.     Sarahi Garcia MD PhD

## 2024-02-08 DIAGNOSIS — I50.32 CHRONIC HEART FAILURE WITH PRESERVED EJECTION FRACTION (MULTI): Primary | ICD-10-CM

## 2024-02-09 ENCOUNTER — INFUSION (OUTPATIENT)
Dept: HEMATOLOGY/ONCOLOGY | Facility: CLINIC | Age: 89
End: 2024-02-09
Payer: MEDICARE

## 2024-02-09 VITALS
BODY MASS INDEX: 26.45 KG/M2 | OXYGEN SATURATION: 97 % | HEART RATE: 68 BPM | SYSTOLIC BLOOD PRESSURE: 192 MMHG | DIASTOLIC BLOOD PRESSURE: 85 MMHG | RESPIRATION RATE: 16 BRPM | WEIGHT: 158.95 LBS | TEMPERATURE: 98.8 F

## 2024-02-09 DIAGNOSIS — D63.1 ANEMIA OF RENAL DISEASE: ICD-10-CM

## 2024-02-09 DIAGNOSIS — I50.32 CHRONIC HEART FAILURE WITH PRESERVED EJECTION FRACTION (MULTI): ICD-10-CM

## 2024-02-09 DIAGNOSIS — D50.0 IRON DEFICIENCY ANEMIA DUE TO CHRONIC BLOOD LOSS: ICD-10-CM

## 2024-02-09 DIAGNOSIS — N18.9 ANEMIA OF RENAL DISEASE: ICD-10-CM

## 2024-02-09 DIAGNOSIS — E61.1 IRON DEFICIENCY: ICD-10-CM

## 2024-02-09 DIAGNOSIS — I50.32 CHRONIC DIASTOLIC CONGESTIVE HEART FAILURE (MULTI): ICD-10-CM

## 2024-02-09 DIAGNOSIS — I50.30 HEART FAILURE WITH PRESERVED EJECTION FRACTION, UNSPECIFIED HF CHRONICITY (MULTI): ICD-10-CM

## 2024-02-09 PROCEDURE — 96365 THER/PROPH/DIAG IV INF INIT: CPT | Mod: INF

## 2024-02-09 PROCEDURE — 2500000004 HC RX 250 GENERAL PHARMACY W/ HCPCS (ALT 636 FOR OP/ED): Performed by: STUDENT IN AN ORGANIZED HEALTH CARE EDUCATION/TRAINING PROGRAM

## 2024-02-09 RX ORDER — ALBUTEROL SULFATE 0.83 MG/ML
3 SOLUTION RESPIRATORY (INHALATION) AS NEEDED
Status: DISCONTINUED | OUTPATIENT
Start: 2024-02-09 | End: 2024-02-09 | Stop reason: HOSPADM

## 2024-02-09 RX ORDER — EPINEPHRINE 0.3 MG/.3ML
0.3 INJECTION SUBCUTANEOUS EVERY 5 MIN PRN
Status: CANCELLED | OUTPATIENT
Start: 2024-02-09

## 2024-02-09 RX ORDER — DIPHENHYDRAMINE HYDROCHLORIDE 50 MG/ML
50 INJECTION INTRAMUSCULAR; INTRAVENOUS AS NEEDED
Status: CANCELLED | OUTPATIENT
Start: 2024-02-09

## 2024-02-09 RX ORDER — FAMOTIDINE 10 MG/ML
20 INJECTION INTRAVENOUS ONCE AS NEEDED
Status: DISCONTINUED | OUTPATIENT
Start: 2024-02-09 | End: 2024-02-09 | Stop reason: HOSPADM

## 2024-02-09 RX ORDER — FAMOTIDINE 10 MG/ML
20 INJECTION INTRAVENOUS ONCE AS NEEDED
Status: CANCELLED | OUTPATIENT
Start: 2024-02-09

## 2024-02-09 RX ORDER — HEPARIN 100 UNIT/ML
500 SYRINGE INTRAVENOUS AS NEEDED
Status: CANCELLED | OUTPATIENT
Start: 2024-02-09

## 2024-02-09 RX ORDER — HEPARIN SODIUM,PORCINE/PF 10 UNIT/ML
50 SYRINGE (ML) INTRAVENOUS AS NEEDED
Status: CANCELLED | OUTPATIENT
Start: 2024-02-09

## 2024-02-09 RX ORDER — ALBUTEROL SULFATE 0.83 MG/ML
3 SOLUTION RESPIRATORY (INHALATION) AS NEEDED
Status: CANCELLED | OUTPATIENT
Start: 2024-02-09

## 2024-02-09 RX ORDER — DIPHENHYDRAMINE HYDROCHLORIDE 50 MG/ML
50 INJECTION INTRAMUSCULAR; INTRAVENOUS AS NEEDED
Status: DISCONTINUED | OUTPATIENT
Start: 2024-02-09 | End: 2024-02-09 | Stop reason: HOSPADM

## 2024-02-09 RX ORDER — EPINEPHRINE 0.3 MG/.3ML
0.3 INJECTION SUBCUTANEOUS EVERY 5 MIN PRN
Status: DISCONTINUED | OUTPATIENT
Start: 2024-02-09 | End: 2024-02-09 | Stop reason: HOSPADM

## 2024-02-09 RX ADMIN — FERUMOXYTOL 510 MG: 510 INJECTION INTRAVENOUS at 08:19

## 2024-02-09 ASSESSMENT — PAIN SCALES - GENERAL: PAINLEVEL: 0-NO PAIN

## 2024-02-09 NOTE — PROGRESS NOTES
Dr. Walton contacted regarding PT's high BP on admission. Denies SOB, headache, dizziness, trouble with coordination/balance. PT states she forgot to take BP meds before attending appointment today. Dr. Walton gave instruction for PT to take BP meds as soon as she gets home today and to remember to take BP meds before next infusion appointment. PT discharged in stable condition.

## 2024-02-19 ENCOUNTER — HOSPITAL ENCOUNTER (OUTPATIENT)
Dept: RADIOLOGY | Facility: CLINIC | Age: 89
Discharge: HOME | End: 2024-02-19
Payer: MEDICARE

## 2024-02-19 DIAGNOSIS — C50.919 MALIGNANT NEOPLASM OF UNSPECIFIED SITE OF UNSPECIFIED FEMALE BREAST (MULTI): ICD-10-CM

## 2024-02-19 PROCEDURE — 77080 DXA BONE DENSITY AXIAL: CPT | Performed by: RADIOLOGY

## 2024-02-19 PROCEDURE — 77080 DXA BONE DENSITY AXIAL: CPT

## 2024-02-21 PROBLEM — N63.0 MASS OF BREAST: Status: ACTIVE | Noted: 2023-07-07

## 2024-02-21 PROBLEM — I35.0 MODERATE AORTIC VALVE STENOSIS: Status: ACTIVE | Noted: 2022-10-10

## 2024-02-21 PROBLEM — J45.909 ASTHMA (HHS-HCC): Status: ACTIVE | Noted: 2022-09-13

## 2024-02-21 PROBLEM — K21.9 GASTROESOPHAGEAL REFLUX DISEASE: Status: ACTIVE | Noted: 2023-08-21

## 2024-02-21 PROBLEM — K11.6 CYST OF PAROTID GLAND: Status: ACTIVE | Noted: 2023-10-11

## 2024-02-21 PROBLEM — R06.09 DYSPNEA ON EXERTION: Status: ACTIVE | Noted: 2024-02-21

## 2024-02-21 PROBLEM — I51.9 HEART DISEASE: Status: ACTIVE | Noted: 2024-02-21

## 2024-02-21 PROBLEM — R60.0 EDEMA OF LEFT LOWER EXTREMITY: Status: ACTIVE | Noted: 2023-04-24

## 2024-02-21 PROBLEM — H43.813 POSTERIOR VITREOUS DETACHMENT OF BOTH EYES: Status: ACTIVE | Noted: 2023-10-11

## 2024-02-21 PROBLEM — D64.9 ANEMIA: Status: ACTIVE | Noted: 2023-04-24

## 2024-02-21 PROBLEM — G89.29 CHRONIC HEADACHE DISORDER: Status: ACTIVE | Noted: 2023-10-11

## 2024-02-21 PROBLEM — M54.50 LOW BACK PAIN, UNSPECIFIED: Status: ACTIVE | Noted: 2022-07-19

## 2024-02-21 PROBLEM — H52.01 HYPEROPIA OF RIGHT EYE: Status: ACTIVE | Noted: 2024-02-21

## 2024-02-21 PROBLEM — M76.899 TENDINITIS OF HIP: Status: ACTIVE | Noted: 2024-02-21

## 2024-02-21 PROBLEM — R06.02 SHORTNESS OF BREATH: Status: ACTIVE | Noted: 2023-04-24

## 2024-02-21 PROBLEM — C50.919 MALIGNANT NEOPLASM OF BREAST (MULTI): Status: ACTIVE | Noted: 2023-07-07

## 2024-02-21 PROBLEM — G89.29 CHRONIC PAIN: Status: ACTIVE | Noted: 2022-07-19

## 2024-02-21 PROBLEM — R10.9 ABDOMINAL PAIN: Status: ACTIVE | Noted: 2024-02-21

## 2024-02-21 PROBLEM — M25.559 ARTHRALGIA OF HIP: Status: ACTIVE | Noted: 2024-02-21

## 2024-02-21 PROBLEM — N20.0 CALCULUS OF KIDNEY: Status: ACTIVE | Noted: 2023-07-05

## 2024-02-21 PROBLEM — I10 BENIGN HYPERTENSION: Status: ACTIVE | Noted: 2023-04-24

## 2024-02-21 PROBLEM — E78.5 HYPERLIPIDEMIA: Status: ACTIVE | Noted: 2023-10-11

## 2024-02-21 PROBLEM — M62.81 MUSCLE WEAKNESS OF UPPER EXTREMITY: Status: ACTIVE | Noted: 2023-10-11

## 2024-02-21 PROBLEM — R51.9 CHRONIC HEADACHE DISORDER: Status: ACTIVE | Noted: 2023-10-11

## 2024-02-21 PROBLEM — M79.606 PAIN OF LOWER EXTREMITY: Status: ACTIVE | Noted: 2024-02-21

## 2024-02-21 PROBLEM — Z98.890 HISTORY OF SPINAL SURGERY: Status: ACTIVE | Noted: 2023-10-11

## 2024-02-21 RX ORDER — METHYLPREDNISOLONE ACETATE 40 MG/ML
INJECTION, SUSPENSION INTRA-ARTICULAR; INTRALESIONAL; INTRAMUSCULAR; SOFT TISSUE
COMMUNITY
Start: 2023-02-02 | End: 2024-05-23 | Stop reason: ENTERED-IN-ERROR

## 2024-02-22 ENCOUNTER — HOSPITAL ENCOUNTER (OUTPATIENT)
Dept: RADIOLOGY | Facility: CLINIC | Age: 89
Discharge: HOME | End: 2024-02-22
Payer: MEDICARE

## 2024-02-22 ENCOUNTER — OFFICE VISIT (OUTPATIENT)
Dept: SURGICAL ONCOLOGY | Facility: CLINIC | Age: 89
End: 2024-02-22
Payer: MEDICARE

## 2024-02-22 VITALS
HEART RATE: 58 BPM | BODY MASS INDEX: 27.04 KG/M2 | SYSTOLIC BLOOD PRESSURE: 202 MMHG | WEIGHT: 162.48 LBS | DIASTOLIC BLOOD PRESSURE: 71 MMHG | TEMPERATURE: 97.3 F

## 2024-02-22 DIAGNOSIS — I50.30 HEART FAILURE WITH PRESERVED EJECTION FRACTION, UNSPECIFIED HF CHRONICITY (MULTI): ICD-10-CM

## 2024-02-22 DIAGNOSIS — N63.0 BREAST MASS IN FEMALE: ICD-10-CM

## 2024-02-22 DIAGNOSIS — D50.0 IRON DEFICIENCY ANEMIA DUE TO CHRONIC BLOOD LOSS: ICD-10-CM

## 2024-02-22 DIAGNOSIS — C50.912: Primary | ICD-10-CM

## 2024-02-22 DIAGNOSIS — C50.911 MALIGNANT NEOPLASM OF UNSPECIFIED SITE OF RIGHT FEMALE BREAST (MULTI): ICD-10-CM

## 2024-02-22 DIAGNOSIS — I10 ESSENTIAL (PRIMARY) HYPERTENSION: ICD-10-CM

## 2024-02-22 DIAGNOSIS — I50.32 CHRONIC DIASTOLIC CONGESTIVE HEART FAILURE (MULTI): ICD-10-CM

## 2024-02-22 DIAGNOSIS — I48.20 CHRONIC ATRIAL FIBRILLATION (MULTI): ICD-10-CM

## 2024-02-22 DIAGNOSIS — M85.80 OSTEOPENIA, UNSPECIFIED LOCATION: ICD-10-CM

## 2024-02-22 DIAGNOSIS — N20.0 LEFT NEPHROLITHIASIS: ICD-10-CM

## 2024-02-22 DIAGNOSIS — Z85.3 ENCOUNTER FOR FOLLOW-UP SURVEILLANCE OF BREAST CANCER: ICD-10-CM

## 2024-02-22 DIAGNOSIS — C50.611 MALIGNANT NEOPLASM OF AXILLARY TAIL OF RIGHT FEMALE BREAST (MULTI): ICD-10-CM

## 2024-02-22 DIAGNOSIS — E11.9 DIABETES MELLITUS TYPE 2 WITHOUT RETINOPATHY (MULTI): ICD-10-CM

## 2024-02-22 DIAGNOSIS — Z08 ENCOUNTER FOR FOLLOW-UP SURVEILLANCE OF BREAST CANCER: ICD-10-CM

## 2024-02-22 DIAGNOSIS — R06.09 DYSPNEA ON EXERTION: ICD-10-CM

## 2024-02-22 DIAGNOSIS — C50.912: ICD-10-CM

## 2024-02-22 DIAGNOSIS — I35.0 AORTIC VALVE STENOSIS, ETIOLOGY OF CARDIAC VALVE DISEASE UNSPECIFIED: ICD-10-CM

## 2024-02-22 DIAGNOSIS — R01.1 HEART MURMUR: ICD-10-CM

## 2024-02-22 DIAGNOSIS — J45.909 ASTHMA, UNSPECIFIED ASTHMA SEVERITY, UNSPECIFIED WHETHER COMPLICATED, UNSPECIFIED WHETHER PERSISTENT (HHS-HCC): ICD-10-CM

## 2024-02-22 DIAGNOSIS — I82.432 ACUTE DEEP VEIN THROMBOSIS (DVT) OF POPLITEAL VEIN OF LEFT LOWER EXTREMITY (MULTI): ICD-10-CM

## 2024-02-22 PROCEDURE — 1157F ADVNC CARE PLAN IN RCRD: CPT | Performed by: SURGERY

## 2024-02-22 PROCEDURE — 3077F SYST BP >= 140 MM HG: CPT | Performed by: SURGERY

## 2024-02-22 PROCEDURE — 1160F RVW MEDS BY RX/DR IN RCRD: CPT | Performed by: SURGERY

## 2024-02-22 PROCEDURE — 76982 USE 1ST TARGET LESION: CPT | Mod: RT

## 2024-02-22 PROCEDURE — 99215 OFFICE O/P EST HI 40 MIN: CPT | Performed by: SURGERY

## 2024-02-22 PROCEDURE — 76642 ULTRASOUND BREAST LIMITED: CPT | Mod: RT

## 2024-02-22 PROCEDURE — 76642 ULTRASOUND BREAST LIMITED: CPT | Mod: 50,59

## 2024-02-22 PROCEDURE — 1159F MED LIST DOCD IN RCRD: CPT | Performed by: SURGERY

## 2024-02-22 PROCEDURE — 1126F AMNT PAIN NOTED NONE PRSNT: CPT | Performed by: SURGERY

## 2024-02-22 PROCEDURE — 76642 ULTRASOUND BREAST LIMITED: CPT | Mod: RIGHT SIDE | Performed by: RADIOLOGY

## 2024-02-22 PROCEDURE — 3078F DIAST BP <80 MM HG: CPT | Performed by: SURGERY

## 2024-02-22 PROCEDURE — 1036F TOBACCO NON-USER: CPT | Performed by: SURGERY

## 2024-02-22 RX ORDER — ANASTROZOLE 1 MG/1
1 TABLET ORAL DAILY
Qty: 90 TABLET | Refills: 1 | Status: SHIPPED | OUTPATIENT
Start: 2024-02-22 | End: 2024-05-03 | Stop reason: SDUPTHER

## 2024-02-22 ASSESSMENT — ENCOUNTER SYMPTOMS
COUGH: 1
WHEEZING: 1
MYALGIAS: 1
BRUISES/BLEEDS EASILY: 1
NECK STIFFNESS: 1
PALPITATIONS: 1
UNEXPECTED WEIGHT CHANGE: 1
ROS GI COMMENTS: BURNING
ARTHRALGIAS: 1
SLEEP DISTURBANCE: 1
JOINT SWELLING: 1
CONFUSION: 1
BACK PAIN: 1
CONSTIPATION: 1
NECK PAIN: 1
EYE ITCHING: 1

## 2024-02-22 ASSESSMENT — PAIN SCALES - GENERAL: PAINLEVEL: 0-NO PAIN

## 2024-02-22 NOTE — PROGRESS NOTES
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      BREAST IMAGING:    On 6/27/2023 Bilateral diagnostic mammogram and right breast ultrasound indicates BI-RADS Category 4, right breast 5:00 6 cm from the nipple is an irregular hypoechoic mass measuring 1.3 x 0.9 x 2 cm, biopsy recommended. Scanning of the right axilla demonstrates 4 unremarkable right axillary lymph nodes.     On 07/07/2023: Patient underwent an US guided core needle biopsy of a right breast mass at 5:00, 6 cm from the nipple. Pathology was consistent with invasive ductal carcinoma, grade 1-2,   ER + (>95%), DE + (30-40%) , HER2 negative    She self palpated a mass in her right breast approximately 12 months prior to presentation. She has a remote history of a benign left breast biopsy. She developed a lower extremity DVT May 2023 while traveling and started Eliquis.     At our initial consultation July 2023 due to recent DVT and extensive comorbidities, as well as age,  she did not desire surgery and I recommended consideration of primary endocrine therapy. She met with Dr. Tena in medical oncology and started anastrozole. She is tolerating well though recently ran out of medication and does not yet have a refill. She feels the mass is slightly smaller in size and has no new breast symptoms.      Today 2/22/24 right breast US showed at the 5 o'clock position 6 cm from the nipple of the right breast, a known similarly appearing irregular hypoechoic mass has decreased in size as compared to prior ultrasound measuring 1.6 x 0.8 x 1.3 cm, previously measuring 2.0 x 0.9 x 1.3 cm. The mass remains hard on elastography with persistent internal vascularity.  Bilateral axillary US showed multiple bilateral normal lymph nodes.     FEMALE  HISTORY: menarche age 11, , first birth age 22, she did not breastfeed, no OCPs, menopause age unknown (hysterectomy with removal of one ovary in her 50s), HRT x 6 years, heterogenously dense tissue     FAMILY CANCER HISTORY:   Daughter: Breast cancer age 53  Maternal Niece: Breast cancer age 30s,  from disease  Sister: Colon cancer   Maternal Nephew (above sisters son): Colon cancer     Past Medical History:   Diagnosis Date    Anemia of renal disease 2023    Chronic diastolic congestive heart failure (CMS/formerly [facility]) 2023    Essential (primary) hypertension     Essential hypertension, benign    Long term (current) use of non-steroidal anti-inflammatories (nsaid)     NSAID long-term use    Melena     Melena    Other chronic pain     Chronic pain    Other forms of dyspnea     Dyspnea on exertion    Other specified soft tissue disorders     Soft tissue mass    Pain in right knee 2020    Chronic pain of right knee    Pain in unspecified knee     Pain in joint, lower leg    Personal history of other diseases of the musculoskeletal system and connective tissue     History of low back pain    Personal history of other diseases of the musculoskeletal system and connective tissue 10/11/2022    History of spinal stenosis    Personal history of other diseases of the respiratory system     History of allergic rhinitis    Personal history of other diseases of the respiratory system 02/15/2018    History of acute bronchitis    Personal history of other endocrine, nutritional and metabolic disease     History of type 2 diabetes mellitus    Personal history of other endocrine, nutritional and metabolic disease     History of hypokalemia    Type 2 diabetes mellitus without complications (CMS/formerly [facility]) 08/15/2018    Diabetes mellitus type 2, controlled     Current Outpatient Medications on File Prior to Visit   Medication Sig Dispense Refill    methylPREDNISolone acetate (DEPO-MedroL) 40 mg/mL injection Depo-Medrol  40 MG/ML Injection Suspension 0 SUSP Inj Quantity: 0 Refills: 0 Ordered: 2-Feb-2023 KIMI Parikh MD Start : 2-Feb-2023 Complete      acetaminophen (Tylenol 8 HOUR) 650 mg ER tablet Take 2 tablets (1,300 mg) by mouth once daily.      albuterol 90 mcg/actuation inhaler INHALE 2 INHALATIONS BY MOUTH  EVERY 6 HOURS IF NEEDED FOR  WHEEZING 46 g 1    apixaban (Eliquis) 5 mg tablet TAKE 1 TABLET BY MOUTH TWICE  DAILY 180 tablet 0    budesonide-formoteroL (Symbicort) 80-4.5 mcg/actuation inhaler Inhale 2 puffs 2 times a day. RINSE MOUTH AFTER USE. As needed      calcium carbonate-vit D3-min 600 mg calcium- 400 unit tablet Take 1 tablet by mouth in the morning and 1 tablet before bedtime.      carvedilol (Coreg) 25 mg tablet Take 1 tablet (25 mg) by mouth 2 times a day. 180 tablet 3    DULoxetine (Cymbalta) 30 mg DR capsule Take 3 capsules (90 mg) by mouth once daily. 270 capsule 1    empagliflozin (Jardiance) 10 mg Take 1 tablet (10 mg) by mouth once daily. 90 tablet 3    Entresto  mg tablet Take 1 tablet by mouth 2 times a day. 60 tablet 11    hydrALAZINE (Apresoline) 10 mg tablet Take 1 tablet (10 mg) by mouth 3 times a day. 270 tablet 3    isosorbide mononitrate ER (Imdur) 30 mg 24 hr tablet Take 1 tablet (30 mg) by mouth once daily. 90 tablet 3    metFORMIN (Glucophage) 500 mg tablet Take 1 tablet (500 mg) by mouth 2 times a day. 180 tablet 1    OneTouch Ultra Test strip USE TO TEST TWICE DAILY AS DIRECTED      pantoprazole (ProtoNix) 40 mg EC tablet TAKE 1 TABLET BY MOUTH ONCE  DAILY 90 tablet 0    pravastatin (Pravachol) 20 mg tablet Take 1 tablet (20 mg) by mouth once daily at bedtime. 90 tablet 3    spironolactone (Aldactone) 25 mg tablet Take 1 tablet (25 mg) by mouth 2 times a day. 90 tablet 3     No current facility-administered medications on file prior to visit.     Review of Systems   Constitutional:  Positive for unexpected weight change.        Wt loss   HENT:  Positive for hearing loss.    Eyes:   Positive for itching and visual disturbance.   Respiratory:  Positive for cough and wheezing.    Cardiovascular:  Positive for chest pain, palpitations and leg swelling.   Gastrointestinal:  Positive for constipation.        Burning   Musculoskeletal:  Positive for arthralgias, back pain, joint swelling, myalgias, neck pain and neck stiffness.   Skin:  Positive for rash.        Itching, skin lesions, breast mass   Hematological:  Bruises/bleeds easily.        Anemia    Psychiatric/Behavioral:  Positive for confusion and sleep disturbance.        Objective   Physical Exam  General: Otherwise healthy appearing. No acute distress.      HEENT: Conjunctiva well-colored, sclera non-icteric. Neck supple, trachea midline. No lymphadenopathy or thyromegaly.      Cardiovascular: Regular rate and rhythm.      Respiratory: Clear to auscultation bilaterally.      Breast: Exam performed in the sitting and supine positions. Right breast: 5:00, 4 cm FN, 1.5 cm x 1 cm mass, smaller than  previous.  No skin or nipple changes. Left breast: No obvious abnormalities palpable, no skin or nipple changes.      Abdomen: Soft, non-tender, non-distended.      Extremities: Atraumatic, no edema.      Neurologic: Motor and sensory grossly intact. Alert and oriented.       Lymphatic: Left 1-2 prominent mobile axillary lymph nodes. No supraclavicular, or infraclavicular lymphadenopathy bilaterally.     Assessment/Plan   We discussed your symptoms, relevant history, physical exam, imaging, pathology results, and treatment for breast cancer. You are taking anastrozole medication as an alternative to surgery. Your ultrasound today shows the right breast cancer is slightly smaller, and the axillary lymph nodes are normal on both sides.     We previously had long discussion regarding treatment, which would usually include surgery, radiation, and endocrine therapy. Sometimes we omit part of this plan due to age and/or other medical problems. I agree  with continuing endocrine therapy (medication) only and will contact Dr. Tena for anastrozole refills.     You expressed you are not interested in surgery and I agree that we can reconsider surgery in the future only if needed based on disease progression.      All questions were answered, and we are in agreement with the following plan:   1. Please continue to see Dr. Tena in medical oncology.  3. Please return to see me if needed.      If you have any questions, concerns, or changes in your breast exam, please call the office. I appreciate the opportunity to care for you, and our breast cancer team looks forward to seeing you again soon.      Debi Pina MD   Breast Surgical Oncology   Department of Surgery   [facility]   Office: [phone] (option #2)   Fax: [phone]     Diagnoses and all orders for this visit:  Invasive ductal carcinoma of breast, stage 1, left (CMS/HCC)  -     [facility] breast limited bilateral; Future  Encounter for follow-up surveillance of breast cancer  Iron deficiency anemia due to chronic blood loss  -     Clinic Appointment Request  Aortic valve stenosis, etiology of cardiac valve disease unspecified  Essential (primary) hypertension  Chronic atrial fibrillation (CMS/HCC)  Chronic diastolic congestive heart failure (CMS/HCC)  Heart failure with preserved ejection fraction, unspecified HF chronicity (CMS/HCC)  Heart murmur  Dyspnea on exertion  Acute deep vein thrombosis (DVT) of popliteal vein of left lower extremity (CMS/HCC)  Diabetes mellitus type 2 without retinopathy (CMS/HCC)  Left nephrolithiasis  Osteopenia, unspecified location  Asthma, unspecified asthma severity, unspecified whether complicated, unspecified whether persistent    Scribe Attestation  By signing my name below, I, Cristopher Arevalo, attest that this documentation has been prepared under the direction and in the presence of Debi Pina MD.

## 2024-02-23 ENCOUNTER — APPOINTMENT (OUTPATIENT)
Dept: HEMATOLOGY/ONCOLOGY | Facility: CLINIC | Age: 89
End: 2024-02-23
Payer: MEDICARE

## 2024-03-01 ENCOUNTER — OFFICE VISIT (OUTPATIENT)
Dept: HEMATOLOGY/ONCOLOGY | Facility: CLINIC | Age: 89
End: 2024-03-01
Payer: MEDICARE

## 2024-03-01 VITALS
WEIGHT: 162.92 LBS | BODY MASS INDEX: 27.11 KG/M2 | DIASTOLIC BLOOD PRESSURE: 62 MMHG | SYSTOLIC BLOOD PRESSURE: 157 MMHG | HEART RATE: 68 BPM

## 2024-03-01 DIAGNOSIS — D63.1 ANEMIA OF RENAL DISEASE: Primary | ICD-10-CM

## 2024-03-01 DIAGNOSIS — C50.912: ICD-10-CM

## 2024-03-01 DIAGNOSIS — N18.9 ANEMIA OF RENAL DISEASE: Primary | ICD-10-CM

## 2024-03-01 DIAGNOSIS — I50.32 CHRONIC DIASTOLIC CONGESTIVE HEART FAILURE (MULTI): ICD-10-CM

## 2024-03-01 DIAGNOSIS — M81.0 AGE-RELATED OSTEOPOROSIS WITHOUT CURRENT PATHOLOGICAL FRACTURE: ICD-10-CM

## 2024-03-01 DIAGNOSIS — D50.0 IRON DEFICIENCY ANEMIA DUE TO CHRONIC BLOOD LOSS: ICD-10-CM

## 2024-03-01 PROCEDURE — 99215 OFFICE O/P EST HI 40 MIN: CPT | Performed by: INTERNAL MEDICINE

## 2024-03-01 PROCEDURE — 3077F SYST BP >= 140 MM HG: CPT | Performed by: INTERNAL MEDICINE

## 2024-03-01 PROCEDURE — 1126F AMNT PAIN NOTED NONE PRSNT: CPT | Performed by: INTERNAL MEDICINE

## 2024-03-01 PROCEDURE — 1159F MED LIST DOCD IN RCRD: CPT | Performed by: INTERNAL MEDICINE

## 2024-03-01 PROCEDURE — 1036F TOBACCO NON-USER: CPT | Performed by: INTERNAL MEDICINE

## 2024-03-01 PROCEDURE — 3078F DIAST BP <80 MM HG: CPT | Performed by: INTERNAL MEDICINE

## 2024-03-01 PROCEDURE — 1157F ADVNC CARE PLAN IN RCRD: CPT | Performed by: INTERNAL MEDICINE

## 2024-03-01 RX ORDER — DIPHENHYDRAMINE HYDROCHLORIDE 50 MG/ML
50 INJECTION INTRAMUSCULAR; INTRAVENOUS AS NEEDED
Status: CANCELLED | OUTPATIENT
Start: 2024-03-29

## 2024-03-01 RX ORDER — ALBUTEROL SULFATE 0.83 MG/ML
3 SOLUTION RESPIRATORY (INHALATION) AS NEEDED
Status: CANCELLED | OUTPATIENT
Start: 2024-03-29

## 2024-03-01 RX ORDER — FAMOTIDINE 10 MG/ML
20 INJECTION INTRAVENOUS ONCE AS NEEDED
Status: CANCELLED | OUTPATIENT
Start: 2024-03-29

## 2024-03-01 RX ORDER — EPINEPHRINE 0.3 MG/.3ML
0.3 INJECTION SUBCUTANEOUS EVERY 5 MIN PRN
Status: CANCELLED | OUTPATIENT
Start: 2024-03-29

## 2024-03-01 ASSESSMENT — PAIN SCALES - GENERAL: PAINLEVEL: 0-NO PAIN

## 2024-03-01 NOTE — PROGRESS NOTES
Patient Visit Information:   Visit Type: New Visit      Cancer History:          Breast         AJCC Edition: 8th (AJCC), Diagnosis Date: 07-Jul-2023, IA, cT1c cN0 cM0 G2     Treatment Synopsis:    88-year-old postmenopausal AA female with right-sided invasive ductal carcinoma, clinical stage IA (cT1c cN0 M0). The patient's breast cancer was diagnosed on July 7, 2023, and is estrogen receptor positive at >95%, progesterone receptor positive at 30-40%, and HER-2/beverly equivocal and not amplified.     CURRENT THERAPY: Primary Hormonal Therapy with Arimidex 1 mg po daily     Details of her history are as follows:      Patient self pated a right breast mass x 6 moths and sought medical attention   06/27/2023: Patient underwent a bilateral diagnostic mammogram and right breast US. This revealed a mass in the right breast at 5:00, 6 cm from the nipple measuring 1/3 x 0.9 x 2 cm. Right axilla revealed 4 unremarkable LNs. There was no evidence of malignancy  in the left breast.    07/07/2023: Patient underwent an US guided core needle biopsy of a right breast mass at 5:00, 6 cm from the nipple. Pathology was consistent with invasive ductal carcinoma, grade 1-2, with receptors as above           History of Present Illness:      ID Statement:    JOSUE RAMOS is a 88 year old Female        Chief Complaint: 88 year old post-menopausal AA female  with newly diagnosed clinical stage IA right-sided breast cancer. Patient has been referred for treatment consideration.   Interval History:    Ms. Ramos is a pleasant 88-year-old postmenopausal AA female with right-sided invasive ductal carcinoma, clinical stage IA (cT1c cN0 M0). The patient's breast cancer  was diagnosed on July 7, 2023, and is estrogen receptor positive at >95%, progesterone receptor positive at 30-40%, and HER-2/beverly equivocal and not amplified.      At the last clinic visit she was started on primary hormonal therapy with Arimidex. She reports that she is  tolerating it well and has no complaints.    She completed a mammogram and US on 02/22/2024. This revealed a decrease in the size of the right breast mass from 2.0 x 0.9 x 1.3cm to 1.6 x 0.8 x 1.3 cm showing favorable response to treatment. DEXA scan was completed on 02/19/2024 and showed osteoporosis.    Review of Systems:   Review of Systems:    ROS:  A 14-point review of system was completed and was negative except for what is noted in HPI.           Allergies and Intolerances:       Intolerances:         Vicodin: Drug, Nausea/Vomiting, Active     Outpatient Medication Profile:      Current Outpatient Medications:     acetaminophen (Tylenol 8 HOUR) 650 mg ER tablet, Take 2 tablets (1,300 mg) by mouth once daily., Disp: , Rfl:     albuterol 90 mcg/actuation inhaler, INHALE 2 INHALATIONS BY MOUTH  EVERY 6 HOURS IF NEEDED FOR  WHEEZING, Disp: 46 g, Rfl: 1    anastrozole (Arimidex) 1 mg tablet, Take 1 tablet (1 mg total) by mouth once daily.  Swallow whole with a drink of water., Disp: 90 tablet, Rfl: 1    apixaban (Eliquis) 5 mg tablet, TAKE 1 TABLET BY MOUTH TWICE  DAILY, Disp: 180 tablet, Rfl: 0    budesonide-formoteroL (Symbicort) 80-4.5 mcg/actuation inhaler, Inhale 2 puffs 2 times a day. RINSE MOUTH AFTER USE. As needed, Disp: , Rfl:     calcium carbonate-vit D3-min 600 mg calcium- 400 unit tablet, Take 1 tablet by mouth in the morning and 1 tablet before bedtime., Disp: , Rfl:     carvedilol (Coreg) 25 mg tablet, Take 1 tablet (25 mg) by mouth 2 times a day., Disp: 180 tablet, Rfl: 3    DULoxetine (Cymbalta) 30 mg DR capsule, Take 3 capsules (90 mg) by mouth once daily., Disp: 270 capsule, Rfl: 1    empagliflozin (Jardiance) 10 mg, Take 1 tablet (10 mg) by mouth once daily., Disp: 90 tablet, Rfl: 3    Entresto  mg tablet, Take 1 tablet by mouth 2 times a day., Disp: 60 tablet, Rfl: 11    hydrALAZINE (Apresoline) 10 mg tablet, Take 1 tablet (10 mg) by mouth 3 times a day., Disp: 270 tablet, Rfl: 3     isosorbide mononitrate ER (Imdur) 30 mg 24 hr tablet, Take 1 tablet (30 mg) by mouth once daily., Disp: 90 tablet, Rfl: 3    metFORMIN (Glucophage) 500 mg tablet, Take 1 tablet (500 mg) by mouth 2 times a day., Disp: 180 tablet, Rfl: 1    methylPREDNISolone acetate (DEPO-MedroL) 40 mg/mL injection, Depo-Medrol 40 MG/ML Injection Suspension 0 SUSP Inj Quantity: 0 Refills: 0 Ordered: 2023 KIMI Parikh MD Start : 2023 Complete, Disp: , Rfl:     OneTouch Ultra Test strip, USE TO TEST TWICE DAILY AS DIRECTED, Disp: , Rfl:     pantoprazole (ProtoNix) 40 mg EC tablet, TAKE 1 TABLET BY MOUTH ONCE  DAILY, Disp: 90 tablet, Rfl: 0    pravastatin (Pravachol) 20 mg tablet, Take 1 tablet (20 mg) by mouth once daily at bedtime., Disp: 90 tablet, Rfl: 3    spironolactone (Aldactone) 25 mg tablet, Take 1 tablet (25 mg) by mouth 2 times a day., Disp: 90 tablet, Rfl: 3            Medical History:         Osteoarthritis: ICD-10: M19.90, Status: Active         HTN (hypertension): ICD-10: I10, Status: Active         DM (diabetes mellitus): ICD-10: E11.9, Status: Active         Breast cancer, stage 1, estrogen receptor positive: ICD-10:  C50.919, Status: Active       Surg History:         History of knee surgery: ICD-10: Z98.890, Status: Active         History of hysterectomy: ICD-10: Z90.710, Status: Active         History of appendectomy: ICD-10: Z90.49, Status: Active         Myocardial infarction: ICD-10: I21.3, Status: Active         Shortness of breath at rest: ICD-10: R06.02, Status: Active        Social History:   Social Substance History:  ·  Smoking Status never smoker   ·  Additional History           Family History:  Daughter had breast cancer at age 53 and is alive, Sister had colon cancer in her 50s, maternal neice had breast cancer in her 30s, and is  from breast cancer, maternal nephew has colon cancer     Breast Cancer Risk Factors:  , Had her menarche at age 11 years, 1st pregnancy at age 22  years, she did not breast feed, menopause age uknown, never used BCP, used HRT x 6years, MIHAI with removal of one ovary in her 50s.             Performance:   ECOG Performance Status: 1- Restricted from physically  stenuous work         Vitals and Measurements:   Visit Vitals  /62 (BP Location: Left arm, Patient Position: Sitting, BP Cuff Size: Adult)   Pulse 68   Wt 73.9 kg (162 lb 14.7 oz)   LMP  (LMP Unknown)   BMI 27.11 kg/m²   OB Status Postmenopausal   Smoking Status Never   BSA 1.84 m²      Physical Exam:      Constitutional: Well developed, awake/alert/oriented  x3, no distress, alert and cooperative   Eyes: PERRL, EOMI, clear sclera   ENMT: mucous membranes moist, no apparent injury,  no lesions seen   Head/Neck: Neck supple, no apparent injury, thyroid  without mass or tenderness, No JVD, trachea midline, no bruits   Respiratory/Thorax: Patent airways, CTAB, normal  breath sounds with good chest expansion, thorax symmetric   Cardiovascular: Regular, rate and rhythm, no murmurs,  2+ equal pulses of the extremities, normal S 1and S 2   Gastrointestinal: Nondistended, soft, non-tender,  no rebound tenderness or guarding, no masses palpable, no organomegaly, +BS, no bruits   Genitourinary: No Discharge, vesicles or other abnormalities   Musculoskeletal: ROM intact, no joint swelling, normal  strength   Extremities: normal extremities, no cyanosis edema,  contusions or wounds, no clubbing   Neurological: alert and oriented x3, intact senses,  motor, response and reflexes, normal strength   Breast: 2 cm mass palpable at 6:00 in the right breast  near inframammary fold. No palpable mass in the left breast. No palpable axillary or supraclavicular lymphadenopathies bilaterally. No swelling of either upper extremity which had free range of motion.   Lymphatic: No significant lymphadenopathy   Psychological: Appropriate mood and behavior   Skin: Warm and dry, no lesions, no rashes         Lab Results:     Lab  Results   Component Value Date    WBC 4.0 (L) 01/23/2024    HGB 11.0 (L) 01/23/2024    HCT 34.9 (L) 01/23/2024    MCV 99 01/23/2024     01/23/2024        Lab Results   Component Value Date    NEUTROABS 2.35 01/18/2024          Chemistry    Lab Results   Component Value Date/Time     01/23/2024 0915    K 4.2 01/23/2024 0915     01/23/2024 0915    CO2 29 01/23/2024 0915    BUN 17 01/23/2024 0915    CREATININE 1.13 (H) 01/23/2024 0915    Lab Results   Component Value Date/Time    CALCIUM 9.7 01/23/2024 0915    ALKPHOS 49 01/23/2024 0915    AST 10 01/23/2024 0915    ALT 6 (L) 01/23/2024 0915    BILITOT 0.5 01/23/2024 0915         Radiology Result:     ·  Results           Impression:     Status post ultrasound guided core needle biopsy of a right breast  mass followed by tissue marker placement. Pathology is pending.     POST PROCEDURE MAMMOGRAM FOR MARKER PLACEMENT.              ADDENDUM:  The final pathology for right breast mass 5 o'clock 6 cm from the  nipple demonstrates invasive ductal carcinoma, grade 1-2. This is  concordant with the imaging findings.  Surgical consultation is  recommended.     Estimated size of mass/extent of disease:  The mass measures 2.0 x  0.9 x 1.3 cm on ultrasound 06/27/2023..     Ipsilateral lymph nodes:  Were unremarkable on ultrasound of  06/27/2023.     MRI recommendation: At the discretion of the referring surgeon.            Mamm Metallic Clip During Breast Biopsy [Jul 21 2023 11:14AM]        Impression:     Status post ultrasound guided core needle biopsy of a right breast  mass followed by tissue marker placement. Pathology is pending.     POST PROCEDURE MAMMOGRAM FOR MARKER PLACEMENT.              ADDENDUM:  The final pathology for right breast mass 5 o'clock 6 cm from the  nipple demonstrates invasive ductal carcinoma, grade 1-2. This is  concordant with the imaging findings.  Surgical consultation is  recommended.     Estimated size of mass/extent of disease:   The mass measures 2.0 x  0.9 x 1.3 cm on ultrasound 06/27/2023..     Ipsilateral lymph nodes:  Were unremarkable on ultrasound of  06/27/2023.     MRI recommendation: At the discretion of the referring surgeon.            Mamm Ultrasound Guided Breast Biopsy [Jul 21 2023 11:14AM]        Assessment and Plan:      Assessment and Plan:   Assessment:    Ms. Ramos is a pleasant 87-year-old postmenopausal AA female with right-sided invasive ductal carcinoma, clinical stage IA (cT1c cN0 M0). The patient's breast cancer  was diagnosed on July 7, 2023, and is estrogen receptor positive at >95%, progesterone receptor positive at 30-40%, and HER-2/beverly equivocal and not amplified.     At the last clinic visit she was started on primary hormonal therapy with Arimidex. She reports that she is tolerating it well and has no complaints.    She completed a mammogram and US on 02/22/2024. This revealed a decrease in the size of the right breast mass from 2.0 x 0.9 x 1.3cm to 1.6 x 0.8 x 1.3 cm showing favorable response to treatment. DEXA scan was completed on 02/19/2024 and showed osteoporosis.     Plan:     Continue Primary systemic endocrine therapy with Anastrozole 1 mg by mouth daily   Vitamin D and Calcium supplements   Denusomab is recommended for osteoporosis in the setting of Aromatase inhibitor therapy. Scheduled for 4 week   RTC 3 months

## 2024-03-18 ENCOUNTER — OFFICE VISIT (OUTPATIENT)
Dept: HEMATOLOGY/ONCOLOGY | Facility: HOSPITAL | Age: 89
End: 2024-03-18
Payer: MEDICARE

## 2024-03-18 ENCOUNTER — LAB (OUTPATIENT)
Dept: LAB | Facility: HOSPITAL | Age: 89
End: 2024-03-18
Payer: MEDICARE

## 2024-03-18 VITALS
TEMPERATURE: 97 F | WEIGHT: 163.7 LBS | HEART RATE: 81 BPM | BODY MASS INDEX: 27.24 KG/M2 | DIASTOLIC BLOOD PRESSURE: 60 MMHG | SYSTOLIC BLOOD PRESSURE: 146 MMHG | RESPIRATION RATE: 16 BRPM | OXYGEN SATURATION: 99 %

## 2024-03-18 DIAGNOSIS — I50.32 CHRONIC DIASTOLIC CONGESTIVE HEART FAILURE (MULTI): ICD-10-CM

## 2024-03-18 DIAGNOSIS — D69.6 THROMBOCYTOPENIA (CMS-HCC): ICD-10-CM

## 2024-03-18 DIAGNOSIS — M81.0 AGE-RELATED OSTEOPOROSIS WITHOUT CURRENT PATHOLOGICAL FRACTURE: ICD-10-CM

## 2024-03-18 DIAGNOSIS — N18.9 ANEMIA OF RENAL DISEASE: ICD-10-CM

## 2024-03-18 DIAGNOSIS — D63.1 ANEMIA OF RENAL DISEASE: ICD-10-CM

## 2024-03-18 DIAGNOSIS — D50.0 IRON DEFICIENCY ANEMIA DUE TO CHRONIC BLOOD LOSS: Primary | ICD-10-CM

## 2024-03-18 DIAGNOSIS — D50.0 IRON DEFICIENCY ANEMIA DUE TO CHRONIC BLOOD LOSS: ICD-10-CM

## 2024-03-18 LAB
ALBUMIN SERPL BCP-MCNC: 3.8 G/DL (ref 3.4–5)
ALP SERPL-CCNC: 52 U/L (ref 33–136)
ALT SERPL W P-5'-P-CCNC: 5 U/L (ref 7–45)
ANION GAP SERPL CALC-SCNC: 9 MMOL/L (ref 10–20)
AST SERPL W P-5'-P-CCNC: 9 U/L (ref 9–39)
BASOPHILS # BLD AUTO: 0.02 X10*3/UL (ref 0–0.1)
BASOPHILS NFR BLD AUTO: 0.5 %
BILIRUB SERPL-MCNC: 0.4 MG/DL (ref 0–1.2)
BUN SERPL-MCNC: 26 MG/DL (ref 6–23)
CALCIUM SERPL-MCNC: 9.7 MG/DL (ref 8.6–10.3)
CHLORIDE SERPL-SCNC: 105 MMOL/L (ref 98–107)
CO2 SERPL-SCNC: 30 MMOL/L (ref 21–32)
CREAT SERPL-MCNC: 1.1 MG/DL (ref 0.5–1.05)
EGFRCR SERPLBLD CKD-EPI 2021: 49 ML/MIN/1.73M*2
EOSINOPHIL # BLD AUTO: 0.11 X10*3/UL (ref 0–0.4)
EOSINOPHIL NFR BLD AUTO: 2.6 %
ERYTHROCYTE [DISTWIDTH] IN BLOOD BY AUTOMATED COUNT: 13.7 % (ref 11.5–14.5)
FERRITIN SERPL-MCNC: 218 NG/ML (ref 8–150)
GLUCOSE SERPL-MCNC: 100 MG/DL (ref 74–99)
HCT VFR BLD AUTO: 33.5 % (ref 36–46)
HGB BLD-MCNC: 10.5 G/DL (ref 12–16)
IMM GRANULOCYTES # BLD AUTO: 0.01 X10*3/UL (ref 0–0.5)
IMM GRANULOCYTES NFR BLD AUTO: 0.2 % (ref 0–0.9)
IRON SATN MFR SERPL: 37 % (ref 25–45)
IRON SERPL-MCNC: 89 UG/DL (ref 35–150)
LYMPHOCYTES # BLD AUTO: 1.26 X10*3/UL (ref 0.8–3)
LYMPHOCYTES NFR BLD AUTO: 30.2 %
MCH RBC QN AUTO: 30.9 PG (ref 26–34)
MCHC RBC AUTO-ENTMCNC: 31.3 G/DL (ref 32–36)
MCV RBC AUTO: 99 FL (ref 80–100)
MONOCYTES # BLD AUTO: 0.55 X10*3/UL (ref 0.05–0.8)
MONOCYTES NFR BLD AUTO: 13.2 %
NEUTROPHILS # BLD AUTO: 2.22 X10*3/UL (ref 1.6–5.5)
NEUTROPHILS NFR BLD AUTO: 53.3 %
NRBC BLD-RTO: 0 /100 WBCS (ref 0–0)
PLATELET # BLD AUTO: 146 X10*3/UL (ref 150–450)
POTASSIUM SERPL-SCNC: 4.1 MMOL/L (ref 3.5–5.3)
PROT SERPL-MCNC: 6.4 G/DL (ref 6.4–8.2)
RBC # BLD AUTO: 3.4 X10*6/UL (ref 4–5.2)
SODIUM SERPL-SCNC: 140 MMOL/L (ref 136–145)
TIBC SERPL-MCNC: 243 UG/DL (ref 240–445)
UIBC SERPL-MCNC: 154 UG/DL (ref 110–370)
WBC # BLD AUTO: 4.2 X10*3/UL (ref 4.4–11.3)

## 2024-03-18 PROCEDURE — 85025 COMPLETE CBC W/AUTO DIFF WBC: CPT

## 2024-03-18 PROCEDURE — 1126F AMNT PAIN NOTED NONE PRSNT: CPT | Performed by: STUDENT IN AN ORGANIZED HEALTH CARE EDUCATION/TRAINING PROGRAM

## 2024-03-18 PROCEDURE — 83540 ASSAY OF IRON: CPT

## 2024-03-18 PROCEDURE — 3078F DIAST BP <80 MM HG: CPT | Performed by: STUDENT IN AN ORGANIZED HEALTH CARE EDUCATION/TRAINING PROGRAM

## 2024-03-18 PROCEDURE — 80053 COMPREHEN METABOLIC PANEL: CPT

## 2024-03-18 PROCEDURE — 1159F MED LIST DOCD IN RCRD: CPT | Performed by: STUDENT IN AN ORGANIZED HEALTH CARE EDUCATION/TRAINING PROGRAM

## 2024-03-18 PROCEDURE — 99214 OFFICE O/P EST MOD 30 MIN: CPT | Performed by: STUDENT IN AN ORGANIZED HEALTH CARE EDUCATION/TRAINING PROGRAM

## 2024-03-18 PROCEDURE — 82728 ASSAY OF FERRITIN: CPT

## 2024-03-18 PROCEDURE — 1160F RVW MEDS BY RX/DR IN RCRD: CPT | Performed by: STUDENT IN AN ORGANIZED HEALTH CARE EDUCATION/TRAINING PROGRAM

## 2024-03-18 PROCEDURE — 1157F ADVNC CARE PLAN IN RCRD: CPT | Performed by: STUDENT IN AN ORGANIZED HEALTH CARE EDUCATION/TRAINING PROGRAM

## 2024-03-18 PROCEDURE — 1036F TOBACCO NON-USER: CPT | Performed by: STUDENT IN AN ORGANIZED HEALTH CARE EDUCATION/TRAINING PROGRAM

## 2024-03-18 PROCEDURE — 3077F SYST BP >= 140 MM HG: CPT | Performed by: STUDENT IN AN ORGANIZED HEALTH CARE EDUCATION/TRAINING PROGRAM

## 2024-03-18 PROCEDURE — 36415 COLL VENOUS BLD VENIPUNCTURE: CPT

## 2024-03-18 ASSESSMENT — PAIN SCALES - GENERAL: PAINLEVEL_OUTOF10: 0-NO PAIN

## 2024-03-18 NOTE — PROGRESS NOTES
Patient ID: Raegan Ramos is a 88 y.o. female.  Referring Physician: Enmanuel Walton MD  [id number] [place] [address]  Primary Care Provider: An Jones MD  Visit Type: Follow Up  Diagnosis: iron deficiency anemia       Subjective    HPI  88 y.o. female with a PMH significant for hypertension, history of headaches, DM, carpal tunnel syndrome, spinal cord stimulator, adult onset asthma, moderate aortic stenosis, and breast cancer who is followed for anemia.      Chart review shows mild but progressive anemia since 2020, recently was evaluated in [place] for Hb 6. Has received intermittent PO iron for the anemia in the past, last many years back. May '23 had a stool occult EIA which was negative. s/p hysterectomy  in her 30s for fibroids. Iron labs show deficiency, and anemia of inflammation, SPEP was negative in May '23.   Last colo was 2yrs back was WNL. Reports no bleeding. She is currently on ASA 81mg and had DVT in soleal vein in April '23 has been on apixaban since then.   Has active breast cancer not planning surgery 2/2 heart disease and age.   Has difficulty swallowing and abdominal pain after eating since about 1yr. Had a barium study which showed mild transient esophageal spasms and no gastroparesis. Has no particular symptoms related to her anemia.  Age appropriate cancer screening: Last colo was 2yrs back was WNL  [facility]: No personal h/o cancer. [facility] sister had colon cancer in her 50s. No blood disease.   Social history: Never smoker, no current ETOH, no RDA. No exposure to CT/RT/toxins.   Was treated with ?2 doses of ferumoxytol last in Oct '23.   01/18/24: generally feeling better except for last 1-2 weeks, continue to walk 1-2 miles per day. Is on ASA from cardiology, has HF. Also on 2.5mg BID of apixaban.   03/18/24: has no particular complaints. Tolerated IV iron well.     Review of Systems - Oncology  10 point review of systems negative except as stated in HPI    Objective    Past Surgical History:   Procedure Laterality Date    BACK SURGERY  10/11/2022    Back Surgery    CT ANGIO CORONARY ART WITH HEARTFLOW IF SCORE >30%  8/7/2023    CT HEART CORONARY ANGIOGRAM 8/7/2023 [facility] CT    HYSTERECTOMY  11/16/2017    Hysterectomy    OTHER SURGICAL HISTORY  10/14/2016    Dental Surgery    TOTAL KNEE ARTHROPLASTY  10/11/2022    Knee Replacement     Oncology History    No history exists.       Physical Exam  Vitals reviewed.   Constitutional:       General: She is not in acute distress.     Appearance: She is not toxic-appearing.   HENT:      Head: Normocephalic and atraumatic.   Pulmonary:      Effort: Pulmonary effort is normal.   Neurological:      General: No focal deficit present.      Mental Status: She is oriented to person, place, and time.   Psychiatric:         Mood and Affect: Mood normal.       BSA: There is no height or weight on file to calculate BSA.  LMP  (LMP Unknown)     Labs:  Lab Results   Component Value Date    WBC 4.0 (L) 01/23/2024    NEUTROABS 2.35 01/18/2024    IGABSOL 0.01 01/18/2024    LYMPHSABS 1.15 01/18/2024    MONOSABS 0.53 01/18/2024    EOSABS 0.10 01/18/2024    BASOSABS 0.02 01/18/2024    RBC 3.52 (L) 01/23/2024    MCV 99 01/23/2024    MCHC 31.5 (L) 01/23/2024    HGB 11.0 (L) 01/23/2024    HCT 34.9 (L) 01/23/2024     01/23/2024     Lab Results   Component Value Date    RETICCTPCT 1.6 01/18/2024      Lab Results   Component Value Date    CREATININE 1.13 (H) 01/23/2024    BUN 17 01/23/2024    EGFR 47 (L) 01/23/2024     01/23/2024    K 4.2 01/23/2024     01/23/2024    CO2 29 01/23/2024      Lab Results   Component Value Date    ALT 6 (L) 01/23/2024    AST 10 01/23/2024    ALKPHOS 49 01/23/2024    BILITOT 0.5 01/23/2024      Lab Results   Component Value Date    TSH 3.03 01/23/2024     Lab Results   Component Value Date    TSH 3.03 01/23/2024     Lab Results   Component Value Date    IRON 99 01/18/2024    TIBC 290 01/18/2024    FERRITIN 92  "01/18/2024      Lab Results   Component Value Date    YVYEAWXQ98 500 08/10/2023      Lab Results   Component Value Date    FOLATE 11.5 08/10/2023     Lab Results   Component Value Date    JOSELO NEGATIVE 07/25/2022    RF <10 12/04/2017    SEDRATE 37 (H) 12/04/2017      Lab Results   Component Value Date    CRP 0.38 12/04/2017      No results found for: \"FRIDA\"  Lab Results   Component Value Date     08/10/2023     Lab Results   Component Value Date    HAPTOGLOBIN 173 08/10/2023     Lab Results   Component Value Date    SPEP NORMAL 09/28/2023     Lab Results   Component Value Date     02/05/2020        No components found for: \"PT\"  aPTT   Date/Time Value Ref Range Status   06/20/2022 03:18 PM 35 26 - 39 sec Final     Comment:       THE APTT IS NO LONGER USED FOR MONITORING     UNFRACTIONATED HEPARIN THERAPY.    FOR MONITORING HEPARIN THERAPY,     USE THE HEPARIN ASSAY.         Assessment/Plan    88 y.o. female with a PMH significant for hypertension, history of headaches, DM, carpal tunnel syndrome, spinal cord stimulator, adult onset asthma, moderate  aortic stenosis/moderate AR, MR, TR and significantly increased pulmonary artery HTN since ECHO in '22, and breast cancer who is followed for anemia.     # Anemia: had anemia for last 2-3 years, [facility], not needing transfusion in the recent past. Has received PO iron last 5yrs back, no interventions since. Iron labs show deficiency, and  anemia of inflammation, SPEP was negative in May '23. Currently on ASA and Apixaban for a DVT. Has had age appropriate cancer screening and has untreated breast cancer, local disease at present. Work up in 8/23 showed CKD with relatively low EPO response for degree of anemia, LFTs, elevated Ca. Mild [facility] anemia with hypoproliferative retic response. Relatively low iron level, in relation to the expected degree of inflammation with comorbidities including kidney disease and heart failure. Normal  TSH, B12 and folate are WNL.  " Hemolysis labs are negative. Fecal occult blood negative. Had myeloma labs in '22 which were negative, negative on repeat as well. She received a course of IV ferumoxytol ending on Oct '23 and another dose in Jan '24. Tolerated well. Appears to have had almost no response with respect to the Hb and continues to have mild intermittent thrombocytopenia, borderline macrocytosis not explained by nutritional deficiency, hypothyroidism, liver disease (imaging mid-'23) and reticulocytosis. Possible differentials would include kidney disease vs marrow disease, marrow biopsy would be indicated.     Plan:   - will call pt to discuss possibility of BMBx.   Follow up: 2 weeks after BMBx   Labs before follow up: CBC/diff, retic     Enmanuel Oh MD

## 2024-03-21 DIAGNOSIS — E11.9 DIABETES MELLITUS TYPE 2 IN NONOBESE (MULTI): ICD-10-CM

## 2024-03-24 RX ORDER — METFORMIN HYDROCHLORIDE 500 MG/1
500 TABLET ORAL 2 TIMES DAILY
Qty: 180 TABLET | Refills: 0 | Status: ON HOLD | OUTPATIENT
Start: 2024-03-24 | End: 2024-05-24

## 2024-03-29 ENCOUNTER — APPOINTMENT (OUTPATIENT)
Dept: HEMATOLOGY/ONCOLOGY | Facility: CLINIC | Age: 89
End: 2024-03-29
Payer: MEDICARE

## 2024-03-29 ENCOUNTER — TELEPHONE (OUTPATIENT)
Dept: HEMATOLOGY/ONCOLOGY | Facility: CLINIC | Age: 89
End: 2024-03-29

## 2024-04-01 ENCOUNTER — INFUSION (OUTPATIENT)
Dept: HEMATOLOGY/ONCOLOGY | Facility: CLINIC | Age: 89
End: 2024-04-01
Payer: MEDICARE

## 2024-04-01 VITALS
WEIGHT: 166.67 LBS | TEMPERATURE: 98.6 F | BODY MASS INDEX: 27.73 KG/M2 | RESPIRATION RATE: 18 BRPM | DIASTOLIC BLOOD PRESSURE: 67 MMHG | OXYGEN SATURATION: 95 % | HEART RATE: 57 BPM | SYSTOLIC BLOOD PRESSURE: 148 MMHG

## 2024-04-01 DIAGNOSIS — N18.9 ANEMIA OF RENAL DISEASE: ICD-10-CM

## 2024-04-01 DIAGNOSIS — M81.0 AGE-RELATED OSTEOPOROSIS WITHOUT CURRENT PATHOLOGICAL FRACTURE: ICD-10-CM

## 2024-04-01 DIAGNOSIS — D50.0 IRON DEFICIENCY ANEMIA DUE TO CHRONIC BLOOD LOSS: ICD-10-CM

## 2024-04-01 DIAGNOSIS — I50.32 CHRONIC DIASTOLIC CONGESTIVE HEART FAILURE (MULTI): ICD-10-CM

## 2024-04-01 DIAGNOSIS — D63.1 ANEMIA OF RENAL DISEASE: ICD-10-CM

## 2024-04-01 PROCEDURE — 96372 THER/PROPH/DIAG INJ SC/IM: CPT | Performed by: INTERNAL MEDICINE

## 2024-04-01 PROCEDURE — 2500000004 HC RX 250 GENERAL PHARMACY W/ HCPCS (ALT 636 FOR OP/ED): Mod: JZ,JG | Performed by: INTERNAL MEDICINE

## 2024-04-01 PROCEDURE — 96372 THER/PROPH/DIAG INJ SC/IM: CPT

## 2024-04-01 RX ORDER — EPINEPHRINE 0.3 MG/.3ML
0.3 INJECTION SUBCUTANEOUS EVERY 5 MIN PRN
Status: CANCELLED | OUTPATIENT
Start: 2024-09-14

## 2024-04-01 RX ORDER — DIPHENHYDRAMINE HYDROCHLORIDE 50 MG/ML
50 INJECTION INTRAMUSCULAR; INTRAVENOUS AS NEEDED
Status: DISCONTINUED | OUTPATIENT
Start: 2024-04-01 | End: 2024-04-01 | Stop reason: HOSPADM

## 2024-04-01 RX ORDER — ALBUTEROL SULFATE 0.83 MG/ML
3 SOLUTION RESPIRATORY (INHALATION) AS NEEDED
Status: CANCELLED | OUTPATIENT
Start: 2024-09-14

## 2024-04-01 RX ORDER — DIPHENHYDRAMINE HYDROCHLORIDE 50 MG/ML
50 INJECTION INTRAMUSCULAR; INTRAVENOUS AS NEEDED
Status: CANCELLED | OUTPATIENT
Start: 2024-09-14

## 2024-04-01 RX ORDER — FAMOTIDINE 10 MG/ML
20 INJECTION INTRAVENOUS ONCE AS NEEDED
Status: DISCONTINUED | OUTPATIENT
Start: 2024-04-01 | End: 2024-04-01 | Stop reason: HOSPADM

## 2024-04-01 RX ORDER — ALBUTEROL SULFATE 0.83 MG/ML
3 SOLUTION RESPIRATORY (INHALATION) AS NEEDED
Status: DISCONTINUED | OUTPATIENT
Start: 2024-04-01 | End: 2024-04-01 | Stop reason: HOSPADM

## 2024-04-01 RX ORDER — FAMOTIDINE 10 MG/ML
20 INJECTION INTRAVENOUS ONCE AS NEEDED
Status: CANCELLED | OUTPATIENT
Start: 2024-09-14

## 2024-04-01 RX ORDER — EPINEPHRINE 0.3 MG/.3ML
0.3 INJECTION SUBCUTANEOUS EVERY 5 MIN PRN
Status: DISCONTINUED | OUTPATIENT
Start: 2024-04-01 | End: 2024-04-01 | Stop reason: HOSPADM

## 2024-04-01 RX ADMIN — DENOSUMAB 60 MG: 60 INJECTION SUBCUTANEOUS at 08:57

## 2024-04-01 ASSESSMENT — PAIN SCALES - GENERAL: PAINLEVEL: 1

## 2024-04-15 DIAGNOSIS — I50.30 HEART FAILURE WITH PRESERVED EJECTION FRACTION, UNSPECIFIED HF CHRONICITY (MULTI): ICD-10-CM

## 2024-04-16 RX ORDER — SPIRONOLACTONE 25 MG/1
25 TABLET ORAL 2 TIMES DAILY
Qty: 180 TABLET | Refills: 3 | Status: SHIPPED | OUTPATIENT
Start: 2024-04-16 | End: 2024-05-27

## 2024-05-03 DIAGNOSIS — C50.912: ICD-10-CM

## 2024-05-03 RX ORDER — ANASTROZOLE 1 MG/1
1 TABLET ORAL DAILY
Qty: 90 TABLET | Refills: 1 | Status: SHIPPED | OUTPATIENT
Start: 2024-05-03 | End: 2024-05-27

## 2024-05-21 DIAGNOSIS — E11.9 DIABETES MELLITUS TYPE 2 IN NONOBESE (MULTI): ICD-10-CM

## 2024-05-23 ENCOUNTER — APPOINTMENT (OUTPATIENT)
Dept: RADIOLOGY | Facility: HOSPITAL | Age: 89
DRG: 853 | End: 2024-05-23
Payer: MEDICARE

## 2024-05-23 ENCOUNTER — HOSPITAL ENCOUNTER (INPATIENT)
Facility: HOSPITAL | Age: 89
LOS: 3 days | DRG: 853 | End: 2024-05-26
Attending: EMERGENCY MEDICINE | Admitting: HOSPITALIST
Payer: MEDICARE

## 2024-05-23 ENCOUNTER — ANESTHESIA EVENT (OUTPATIENT)
Dept: OPERATING ROOM | Facility: HOSPITAL | Age: 89
DRG: 853 | End: 2024-05-23
Payer: MEDICARE

## 2024-05-23 ENCOUNTER — ANESTHESIA (OUTPATIENT)
Dept: OPERATING ROOM | Facility: HOSPITAL | Age: 89
DRG: 853 | End: 2024-05-23
Payer: MEDICARE

## 2024-05-23 ENCOUNTER — APPOINTMENT (OUTPATIENT)
Dept: CARDIOLOGY | Facility: HOSPITAL | Age: 89
DRG: 853 | End: 2024-05-23
Payer: MEDICARE

## 2024-05-23 DIAGNOSIS — T68.XXXA HYPOTHERMIA, INITIAL ENCOUNTER: ICD-10-CM

## 2024-05-23 DIAGNOSIS — K56.609 SBO (SMALL BOWEL OBSTRUCTION) (MULTI): Primary | ICD-10-CM

## 2024-05-23 DIAGNOSIS — A41.9 SEPSIS, DUE TO UNSPECIFIED ORGANISM, UNSPECIFIED WHETHER ACUTE ORGAN DYSFUNCTION PRESENT (MULTI): ICD-10-CM

## 2024-05-23 LAB
ALBUMIN SERPL BCP-MCNC: 2.7 G/DL (ref 3.4–5)
ALBUMIN SERPL BCP-MCNC: 4.2 G/DL (ref 3.4–5)
ALP SERPL-CCNC: 52 U/L (ref 33–136)
ALT SERPL W P-5'-P-CCNC: 7 U/L (ref 7–45)
ANION GAP BLDA CALCULATED.4IONS-SCNC: 13 MMO/L (ref 10–25)
ANION GAP BLDA CALCULATED.4IONS-SCNC: 16 MMO/L (ref 10–25)
ANION GAP SERPL CALC-SCNC: 13 MMOL/L (ref 10–20)
ANION GAP SERPL CALC-SCNC: 16 MMOL/L (ref 10–20)
ANION GAP SERPL CALC-SCNC: 17 MMOL/L (ref 10–20)
APPEARANCE UR: CLEAR
AST SERPL W P-5'-P-CCNC: 17 U/L (ref 9–39)
BASE EXCESS BLDA CALC-SCNC: -12.1 MMOL/L (ref -2–3)
BASE EXCESS BLDA CALC-SCNC: -9.7 MMOL/L (ref -2–3)
BASOPHILS # BLD AUTO: 0.02 X10*3/UL (ref 0–0.1)
BASOPHILS NFR BLD AUTO: 0.2 %
BILIRUB SERPL-MCNC: 0.5 MG/DL (ref 0–1.2)
BILIRUB UR STRIP.AUTO-MCNC: NEGATIVE MG/DL
BODY TEMPERATURE: 37 DEGREES CELSIUS
BODY TEMPERATURE: 37 DEGREES CELSIUS
BUN SERPL-MCNC: 25 MG/DL (ref 6–23)
BUN SERPL-MCNC: 30 MG/DL (ref 6–23)
BUN SERPL-MCNC: 36 MG/DL (ref 6–23)
CA-I BLDA-SCNC: 1.27 MMOL/L (ref 1.1–1.33)
CA-I BLDA-SCNC: 1.54 MMOL/L (ref 1.1–1.33)
CALCIUM SERPL-MCNC: 10.4 MG/DL (ref 8.6–10.3)
CALCIUM SERPL-MCNC: 8 MG/DL (ref 8.6–10.3)
CALCIUM SERPL-MCNC: 8.5 MG/DL (ref 8.6–10.3)
CARDIAC TROPONIN I PNL SERPL HS: 13 NG/L (ref 0–13)
CHLORIDE BLDA-SCNC: 111 MMOL/L (ref 98–107)
CHLORIDE BLDA-SCNC: 111 MMOL/L (ref 98–107)
CHLORIDE SERPL-SCNC: 103 MMOL/L (ref 98–107)
CHLORIDE SERPL-SCNC: 103 MMOL/L (ref 98–107)
CHLORIDE SERPL-SCNC: 113 MMOL/L (ref 98–107)
CO2 SERPL-SCNC: 19 MMOL/L (ref 21–32)
CO2 SERPL-SCNC: 20 MMOL/L (ref 21–32)
CO2 SERPL-SCNC: 22 MMOL/L (ref 21–32)
COHGB MFR BLDA: 1.9 %
COLOR UR: COLORLESS
CREAT SERPL-MCNC: 1.06 MG/DL (ref 0.5–1.05)
CREAT SERPL-MCNC: 1.56 MG/DL (ref 0.5–1.05)
CREAT SERPL-MCNC: 1.87 MG/DL (ref 0.5–1.05)
DO-HGB MFR BLDA: 0.5 % (ref 0–5)
EGFRCR SERPLBLD CKD-EPI 2021: 26 ML/MIN/1.73M*2
EGFRCR SERPLBLD CKD-EPI 2021: 32 ML/MIN/1.73M*2
EGFRCR SERPLBLD CKD-EPI 2021: 51 ML/MIN/1.73M*2
EOSINOPHIL # BLD AUTO: 0.12 X10*3/UL (ref 0–0.4)
EOSINOPHIL NFR BLD AUTO: 1.1 %
ERYTHROCYTE [DISTWIDTH] IN BLOOD BY AUTOMATED COUNT: 13.6 % (ref 11.5–14.5)
ERYTHROCYTE [DISTWIDTH] IN BLOOD BY AUTOMATED COUNT: 14.4 % (ref 11.5–14.5)
EST. AVERAGE GLUCOSE BLD GHB EST-MCNC: 126 MG/DL
GLUCOSE BLD MANUAL STRIP-MCNC: 105 MG/DL (ref 74–99)
GLUCOSE BLD MANUAL STRIP-MCNC: 157 MG/DL (ref 74–99)
GLUCOSE BLD MANUAL STRIP-MCNC: 303 MG/DL (ref 74–99)
GLUCOSE BLD MANUAL STRIP-MCNC: 406 MG/DL (ref 74–99)
GLUCOSE BLD MANUAL STRIP-MCNC: 443 MG/DL (ref 74–99)
GLUCOSE BLDA-MCNC: 197 MG/DL (ref 74–99)
GLUCOSE BLDA-MCNC: 299 MG/DL (ref 74–99)
GLUCOSE SERPL-MCNC: 170 MG/DL (ref 74–99)
GLUCOSE SERPL-MCNC: 214 MG/DL (ref 74–99)
GLUCOSE SERPL-MCNC: 433 MG/DL (ref 74–99)
GLUCOSE UR STRIP.AUTO-MCNC: ABNORMAL MG/DL
HBA1C MFR BLD: 6 %
HCO3 BLDA-SCNC: 14.7 MMOL/L (ref 22–26)
HCO3 BLDA-SCNC: 17.2 MMOL/L (ref 22–26)
HCT VFR BLD AUTO: 33.3 % (ref 36–46)
HCT VFR BLD AUTO: 40.3 % (ref 36–46)
HCT VFR BLD EST: 30 % (ref 36–46)
HCT VFR BLD EST: 30 % (ref 36–46)
HGB BLD-MCNC: 10.2 G/DL (ref 12–16)
HGB BLD-MCNC: 13 G/DL (ref 12–16)
HGB BLDA-MCNC: 10 G/DL (ref 12–16)
HGB BLDA-MCNC: 10 G/DL (ref 12–16)
HGB BLDA-MCNC: 9.9 G/DL (ref 12–16)
HOLD SPECIMEN: NORMAL
IMM GRANULOCYTES # BLD AUTO: 0.04 X10*3/UL (ref 0–0.5)
IMM GRANULOCYTES NFR BLD AUTO: 0.4 % (ref 0–0.9)
INHALED O2 CONCENTRATION: 50 %
KETONES UR STRIP.AUTO-MCNC: ABNORMAL MG/DL
LACTATE BLDA-SCNC: 2.8 MMOL/L (ref 0.4–2)
LACTATE BLDA-SCNC: 4 MMOL/L (ref 0.4–2)
LACTATE SERPL-SCNC: 0.7 MMOL/L (ref 0.4–2)
LEUKOCYTE ESTERASE UR QL STRIP.AUTO: NEGATIVE
LIPASE SERPL-CCNC: 9 U/L (ref 9–82)
LYMPHOCYTES # BLD AUTO: 2.12 X10*3/UL (ref 0.8–3)
LYMPHOCYTES NFR BLD AUTO: 19.1 %
MAGNESIUM SERPL-MCNC: 2.2 MG/DL (ref 1.6–2.4)
MCH RBC QN AUTO: 30.6 PG (ref 26–34)
MCH RBC QN AUTO: 31.2 PG (ref 26–34)
MCHC RBC AUTO-ENTMCNC: 30.6 G/DL (ref 32–36)
MCHC RBC AUTO-ENTMCNC: 32.3 G/DL (ref 32–36)
MCV RBC AUTO: 102 FL (ref 80–100)
MCV RBC AUTO: 95 FL (ref 80–100)
METHGB MFR BLDA: 1.1 % (ref 0–1.5)
MONOCYTES # BLD AUTO: 0.78 X10*3/UL (ref 0.05–0.8)
MONOCYTES NFR BLD AUTO: 7 %
NEUTROPHILS # BLD AUTO: 8.01 X10*3/UL (ref 1.6–5.5)
NEUTROPHILS NFR BLD AUTO: 72.2 %
NITRITE UR QL STRIP.AUTO: NEGATIVE
NRBC BLD-RTO: 0 /100 WBCS (ref 0–0)
NRBC BLD-RTO: 0 /100 WBCS (ref 0–0)
OXYHGB MFR BLDA: 96.4 % (ref 94–98)
OXYHGB MFR BLDA: 96.4 % (ref 94–98)
OXYHGB MFR BLDA: 97.2 % (ref 94–98)
PCO2 BLDA: 36 MM HG (ref 38–42)
PCO2 BLDA: 41 MM HG (ref 38–42)
PH BLDA: 7.22 PH (ref 7.38–7.42)
PH BLDA: 7.23 PH (ref 7.38–7.42)
PH UR STRIP.AUTO: 8 [PH]
PHOSPHATE SERPL-MCNC: 5.3 MG/DL (ref 2.5–4.9)
PLATELET # BLD AUTO: 166 X10*3/UL (ref 150–450)
PLATELET # BLD AUTO: 217 X10*3/UL (ref 150–450)
PO2 BLDA: 190 MM HG (ref 85–95)
PO2 BLDA: 243 MM HG (ref 85–95)
POTASSIUM BLDA-SCNC: 4.3 MMOL/L (ref 3.5–5.3)
POTASSIUM BLDA-SCNC: 4.4 MMOL/L (ref 3.5–5.3)
POTASSIUM SERPL-SCNC: 4.1 MMOL/L (ref 3.5–5.3)
POTASSIUM SERPL-SCNC: 4.6 MMOL/L (ref 3.5–5.3)
POTASSIUM SERPL-SCNC: 5.7 MMOL/L (ref 3.5–5.3)
PROT SERPL-MCNC: 7.5 G/DL (ref 6.4–8.2)
PROT UR STRIP.AUTO-MCNC: ABNORMAL MG/DL
RBC # BLD AUTO: 3.27 X10*6/UL (ref 4–5.2)
RBC # BLD AUTO: 4.25 X10*6/UL (ref 4–5.2)
RBC # UR STRIP.AUTO: NEGATIVE /UL
RBC #/AREA URNS AUTO: NORMAL /HPF
SAO2 % BLDA: 100 % (ref 94–100)
SAO2 % BLDA: 100 % (ref 94–100)
SODIUM BLDA-SCNC: 137 MMOL/L (ref 136–145)
SODIUM BLDA-SCNC: 137 MMOL/L (ref 136–145)
SODIUM SERPL-SCNC: 133 MMOL/L (ref 136–145)
SODIUM SERPL-SCNC: 138 MMOL/L (ref 136–145)
SODIUM SERPL-SCNC: 140 MMOL/L (ref 136–145)
SP GR UR STRIP.AUTO: >1.05
SQUAMOUS #/AREA URNS AUTO: NORMAL /HPF
UROBILINOGEN UR STRIP.AUTO-MCNC: NORMAL MG/DL
WBC # BLD AUTO: 11.1 X10*3/UL (ref 4.4–11.3)
WBC # BLD AUTO: 13.4 X10*3/UL (ref 4.4–11.3)
WBC #/AREA URNS AUTO: NORMAL /HPF

## 2024-05-23 PROCEDURE — 82810 BLOOD GASES O2 SAT ONLY: CPT | Mod: 91

## 2024-05-23 PROCEDURE — 2500000004 HC RX 250 GENERAL PHARMACY W/ HCPCS (ALT 636 FOR OP/ED): Performed by: INTERNAL MEDICINE

## 2024-05-23 PROCEDURE — A4217 STERILE WATER/SALINE, 500 ML: HCPCS | Performed by: SURGERY

## 2024-05-23 PROCEDURE — 84484 ASSAY OF TROPONIN QUANT: CPT | Performed by: EMERGENCY MEDICINE

## 2024-05-23 PROCEDURE — 2500000004 HC RX 250 GENERAL PHARMACY W/ HCPCS (ALT 636 FOR OP/ED): Performed by: SURGERY

## 2024-05-23 PROCEDURE — 99223 1ST HOSP IP/OBS HIGH 75: CPT | Performed by: INTERNAL MEDICINE

## 2024-05-23 PROCEDURE — 36620 INSERTION CATHETER ARTERY: CPT | Performed by: ANESTHESIOLOGY

## 2024-05-23 PROCEDURE — 2500000004 HC RX 250 GENERAL PHARMACY W/ HCPCS (ALT 636 FOR OP/ED): Performed by: NURSE PRACTITIONER

## 2024-05-23 PROCEDURE — C9113 INJ PANTOPRAZOLE SODIUM, VIA: HCPCS | Performed by: NURSE PRACTITIONER

## 2024-05-23 PROCEDURE — 36415 COLL VENOUS BLD VENIPUNCTURE: CPT | Performed by: EMERGENCY MEDICINE

## 2024-05-23 PROCEDURE — 2500000004 HC RX 250 GENERAL PHARMACY W/ HCPCS (ALT 636 FOR OP/ED)

## 2024-05-23 PROCEDURE — 3600000003 HC OR TIME - INITIAL BASE CHARGE - PROCEDURE LEVEL THREE: Performed by: SURGERY

## 2024-05-23 PROCEDURE — 82810 BLOOD GASES O2 SAT ONLY: CPT | Mod: 91 | Performed by: NURSE PRACTITIONER

## 2024-05-23 PROCEDURE — 44120 REMOVAL OF SMALL INTESTINE: CPT | Performed by: SURGERY

## 2024-05-23 PROCEDURE — 2500000004 HC RX 250 GENERAL PHARMACY W/ HCPCS (ALT 636 FOR OP/ED): Mod: JW | Performed by: ANESTHESIOLOGIST ASSISTANT

## 2024-05-23 PROCEDURE — 85025 COMPLETE CBC W/AUTO DIFF WBC: CPT | Performed by: EMERGENCY MEDICINE

## 2024-05-23 PROCEDURE — 83605 ASSAY OF LACTIC ACID: CPT | Performed by: EMERGENCY MEDICINE

## 2024-05-23 PROCEDURE — 74018 RADEX ABDOMEN 1 VIEW: CPT

## 2024-05-23 PROCEDURE — 80053 COMPREHEN METABOLIC PANEL: CPT | Performed by: EMERGENCY MEDICINE

## 2024-05-23 PROCEDURE — 2500000005 HC RX 250 GENERAL PHARMACY W/O HCPCS: Performed by: ANESTHESIOLOGIST ASSISTANT

## 2024-05-23 PROCEDURE — 71045 X-RAY EXAM CHEST 1 VIEW: CPT

## 2024-05-23 PROCEDURE — A44120 PR RESECT SMALL INTEST,SINGL RESEC/ANAS: Performed by: ANESTHESIOLOGY

## 2024-05-23 PROCEDURE — 96361 HYDRATE IV INFUSION ADD-ON: CPT | Mod: 59

## 2024-05-23 PROCEDURE — 84132 ASSAY OF SERUM POTASSIUM: CPT | Mod: 91

## 2024-05-23 PROCEDURE — 2550000001 HC RX 255 CONTRASTS: Performed by: EMERGENCY MEDICINE

## 2024-05-23 PROCEDURE — 88307 TISSUE EXAM BY PATHOLOGIST: CPT | Mod: TC,AHULAB | Performed by: SURGERY

## 2024-05-23 PROCEDURE — 99100 ANES PT EXTEME AGE<1 YR&>70: CPT | Performed by: ANESTHESIOLOGY

## 2024-05-23 PROCEDURE — 2500000004 HC RX 250 GENERAL PHARMACY W/ HCPCS (ALT 636 FOR OP/ED): Performed by: ANESTHESIOLOGIST ASSISTANT

## 2024-05-23 PROCEDURE — 2500000002 HC RX 250 W HCPCS SELF ADMINISTERED DRUGS (ALT 637 FOR MEDICARE OP, ALT 636 FOR OP/ED): Performed by: ANESTHESIOLOGIST ASSISTANT

## 2024-05-23 PROCEDURE — 3700000002 HC GENERAL ANESTHESIA TIME - EACH INCREMENTAL 1 MINUTE: Performed by: SURGERY

## 2024-05-23 PROCEDURE — 81001 URINALYSIS AUTO W/SCOPE: CPT | Performed by: EMERGENCY MEDICINE

## 2024-05-23 PROCEDURE — 2500000004 HC RX 250 GENERAL PHARMACY W/ HCPCS (ALT 636 FOR OP/ED): Performed by: STUDENT IN AN ORGANIZED HEALTH CARE EDUCATION/TRAINING PROGRAM

## 2024-05-23 PROCEDURE — 80048 BASIC METABOLIC PNL TOTAL CA: CPT | Mod: CCI | Performed by: INTERNAL MEDICINE

## 2024-05-23 PROCEDURE — 74174 CTA ABD&PLVS W/CONTRAST: CPT

## 2024-05-23 PROCEDURE — 87040 BLOOD CULTURE FOR BACTERIA: CPT | Mod: AHULAB | Performed by: EMERGENCY MEDICINE

## 2024-05-23 PROCEDURE — 96375 TX/PRO/DX INJ NEW DRUG ADDON: CPT | Mod: 59

## 2024-05-23 PROCEDURE — 3E043XZ INTRODUCTION OF VASOPRESSOR INTO CENTRAL VEIN, PERCUTANEOUS APPROACH: ICD-10-PCS | Performed by: NURSE PRACTITIONER

## 2024-05-23 PROCEDURE — 74174 CTA ABD&PLVS W/CONTRAST: CPT | Performed by: SURGERY

## 2024-05-23 PROCEDURE — 0DJD4ZZ INSPECTION OF LOWER INTESTINAL TRACT, PERCUTANEOUS ENDOSCOPIC APPROACH: ICD-10-PCS | Performed by: SURGERY

## 2024-05-23 PROCEDURE — 0D9670Z DRAINAGE OF STOMACH WITH DRAINAGE DEVICE, VIA NATURAL OR ARTIFICIAL OPENING: ICD-10-PCS

## 2024-05-23 PROCEDURE — 96376 TX/PRO/DX INJ SAME DRUG ADON: CPT | Mod: 59

## 2024-05-23 PROCEDURE — 84132 ASSAY OF SERUM POTASSIUM: CPT | Mod: 91 | Performed by: NURSE PRACTITIONER

## 2024-05-23 PROCEDURE — 2500000002 HC RX 250 W HCPCS SELF ADMINISTERED DRUGS (ALT 637 FOR MEDICARE OP, ALT 636 FOR OP/ED): Performed by: NURSE PRACTITIONER

## 2024-05-23 PROCEDURE — 74018 RADEX ABDOMEN 1 VIEW: CPT | Performed by: SURGERY

## 2024-05-23 PROCEDURE — A44120 PR RESECT SMALL INTEST,SINGL RESEC/ANAS: Performed by: ANESTHESIOLOGIST ASSISTANT

## 2024-05-23 PROCEDURE — 2500000004 HC RX 250 GENERAL PHARMACY W/ HCPCS (ALT 636 FOR OP/ED): Performed by: PEDIATRICS

## 2024-05-23 PROCEDURE — 99221 1ST HOSP IP/OBS SF/LOW 40: CPT | Performed by: SURGERY

## 2024-05-23 PROCEDURE — 2500000004 HC RX 250 GENERAL PHARMACY W/ HCPCS (ALT 636 FOR OP/ED): Performed by: HOSPITALIST

## 2024-05-23 PROCEDURE — 2500000004 HC RX 250 GENERAL PHARMACY W/ HCPCS (ALT 636 FOR OP/ED): Performed by: EMERGENCY MEDICINE

## 2024-05-23 PROCEDURE — 2500000005 HC RX 250 GENERAL PHARMACY W/O HCPCS: Performed by: SURGERY

## 2024-05-23 PROCEDURE — 5A1945Z RESPIRATORY VENTILATION, 24-96 CONSECUTIVE HOURS: ICD-10-PCS | Performed by: EMERGENCY MEDICINE

## 2024-05-23 PROCEDURE — 99285 EMERGENCY DEPT VISIT HI MDM: CPT | Mod: 25

## 2024-05-23 PROCEDURE — 71045 X-RAY EXAM CHEST 1 VIEW: CPT | Performed by: STUDENT IN AN ORGANIZED HEALTH CARE EDUCATION/TRAINING PROGRAM

## 2024-05-23 PROCEDURE — 0DB80ZZ EXCISION OF SMALL INTESTINE, OPEN APPROACH: ICD-10-PCS | Performed by: SURGERY

## 2024-05-23 PROCEDURE — 71045 X-RAY EXAM CHEST 1 VIEW: CPT | Mod: MUE

## 2024-05-23 PROCEDURE — 3600000008 HC OR TIME - EACH INCREMENTAL 1 MINUTE - PROCEDURE LEVEL THREE: Performed by: SURGERY

## 2024-05-23 PROCEDURE — 82947 ASSAY GLUCOSE BLOOD QUANT: CPT | Mod: 91,MUE | Performed by: NURSE PRACTITIONER

## 2024-05-23 PROCEDURE — 2720000007 HC OR 272 NO HCPCS: Performed by: SURGERY

## 2024-05-23 PROCEDURE — 83036 HEMOGLOBIN GLYCOSYLATED A1C: CPT | Mod: AHULAB | Performed by: HOSPITALIST

## 2024-05-23 PROCEDURE — 82947 ASSAY GLUCOSE BLOOD QUANT: CPT | Mod: 91,MUE

## 2024-05-23 PROCEDURE — 83735 ASSAY OF MAGNESIUM: CPT | Performed by: EMERGENCY MEDICINE

## 2024-05-23 PROCEDURE — 71045 X-RAY EXAM CHEST 1 VIEW: CPT | Performed by: SURGERY

## 2024-05-23 PROCEDURE — 0DN80ZZ RELEASE SMALL INTESTINE, OPEN APPROACH: ICD-10-PCS | Performed by: SURGERY

## 2024-05-23 PROCEDURE — 71275 CT ANGIOGRAPHY CHEST: CPT | Performed by: SURGERY

## 2024-05-23 PROCEDURE — 36556 INSERT NON-TUNNEL CV CATH: CPT | Performed by: NURSE PRACTITIONER

## 2024-05-23 PROCEDURE — 93005 ELECTROCARDIOGRAM TRACING: CPT

## 2024-05-23 PROCEDURE — 71045 X-RAY EXAM CHEST 1 VIEW: CPT | Performed by: RADIOLOGY

## 2024-05-23 PROCEDURE — 2020000001 HC ICU ROOM DAILY

## 2024-05-23 PROCEDURE — 2500000001 HC RX 250 WO HCPCS SELF ADMINISTERED DRUGS (ALT 637 FOR MEDICARE OP): Performed by: STUDENT IN AN ORGANIZED HEALTH CARE EDUCATION/TRAINING PROGRAM

## 2024-05-23 PROCEDURE — 85027 COMPLETE CBC AUTOMATED: CPT | Performed by: NURSE PRACTITIONER

## 2024-05-23 PROCEDURE — 37799 UNLISTED PX VASCULAR SURGERY: CPT | Performed by: NURSE PRACTITIONER

## 2024-05-23 PROCEDURE — 88307 TISSUE EXAM BY PATHOLOGIST: CPT | Performed by: PATHOLOGY

## 2024-05-23 PROCEDURE — 82947 ASSAY GLUCOSE BLOOD QUANT: CPT | Mod: 91

## 2024-05-23 PROCEDURE — 02HV33Z INSERTION OF INFUSION DEVICE INTO SUPERIOR VENA CAVA, PERCUTANEOUS APPROACH: ICD-10-PCS | Performed by: NURSE PRACTITIONER

## 2024-05-23 PROCEDURE — 96365 THER/PROPH/DIAG IV INF INIT: CPT | Mod: 59

## 2024-05-23 PROCEDURE — 94002 VENT MGMT INPAT INIT DAY: CPT

## 2024-05-23 PROCEDURE — 2500000005 HC RX 250 GENERAL PHARMACY W/O HCPCS: Performed by: NURSE PRACTITIONER

## 2024-05-23 PROCEDURE — 3700000001 HC GENERAL ANESTHESIA TIME - INITIAL BASE CHARGE: Performed by: SURGERY

## 2024-05-23 PROCEDURE — 74018 RADEX ABDOMEN 1 VIEW: CPT | Performed by: STUDENT IN AN ORGANIZED HEALTH CARE EDUCATION/TRAINING PROGRAM

## 2024-05-23 PROCEDURE — 76937 US GUIDE VASCULAR ACCESS: CPT | Performed by: ANESTHESIOLOGY

## 2024-05-23 PROCEDURE — 83690 ASSAY OF LIPASE: CPT | Performed by: EMERGENCY MEDICINE

## 2024-05-23 PROCEDURE — 2500000002 HC RX 250 W HCPCS SELF ADMINISTERED DRUGS (ALT 637 FOR MEDICARE OP, ALT 636 FOR OP/ED): Performed by: INTERNAL MEDICINE

## 2024-05-23 PROCEDURE — 2500000005 HC RX 250 GENERAL PHARMACY W/O HCPCS: Performed by: EMERGENCY MEDICINE

## 2024-05-23 RX ORDER — NOREPINEPHRINE BITARTRATE/D5W 8 MG/250ML
0-3 PLASTIC BAG, INJECTION (ML) INTRAVENOUS CONTINUOUS
Status: DISCONTINUED | OUTPATIENT
Start: 2024-05-23 | End: 2024-05-24

## 2024-05-23 RX ORDER — ACETAMINOPHEN 325 MG/1
650 TABLET ORAL EVERY 4 HOURS PRN
Status: DISCONTINUED | OUTPATIENT
Start: 2024-05-23 | End: 2024-05-26 | Stop reason: HOSPADM

## 2024-05-23 RX ORDER — ONDANSETRON HYDROCHLORIDE 2 MG/ML
4 INJECTION, SOLUTION INTRAVENOUS EVERY 8 HOURS PRN
Status: DISCONTINUED | OUTPATIENT
Start: 2024-05-23 | End: 2024-05-26 | Stop reason: HOSPADM

## 2024-05-23 RX ORDER — PROPOFOL 10 MG/ML
INJECTION, EMULSION INTRAVENOUS CONTINUOUS PRN
Status: DISCONTINUED | OUTPATIENT
Start: 2024-05-23 | End: 2024-05-23

## 2024-05-23 RX ORDER — DULOXETIN HYDROCHLORIDE 30 MG/1
90 CAPSULE, DELAYED RELEASE ORAL DAILY
Status: DISCONTINUED | OUTPATIENT
Start: 2024-05-23 | End: 2024-05-26 | Stop reason: HOSPADM

## 2024-05-23 RX ORDER — LIDOCAINE HYDROCHLORIDE 20 MG/ML
INJECTION, SOLUTION EPIDURAL; INFILTRATION; INTRACAUDAL; PERINEURAL AS NEEDED
Status: DISCONTINUED | OUTPATIENT
Start: 2024-05-23 | End: 2024-05-23

## 2024-05-23 RX ORDER — PHENYLEPHRINE 10 MG/250 ML(40 MCG/ML)IN 0.9 % SOD.CHLORIDE INTRAVENOUS
CONTINUOUS PRN
Status: DISCONTINUED | OUTPATIENT
Start: 2024-05-23 | End: 2024-05-23

## 2024-05-23 RX ORDER — DEXTROSE 50 % IN WATER (D50W) INTRAVENOUS SYRINGE
12.5
Status: DISCONTINUED | OUTPATIENT
Start: 2024-05-23 | End: 2024-05-23

## 2024-05-23 RX ORDER — MORPHINE SULFATE 2 MG/ML
2 INJECTION, SOLUTION INTRAMUSCULAR; INTRAVENOUS EVERY 4 HOURS PRN
Status: DISCONTINUED | OUTPATIENT
Start: 2024-05-23 | End: 2024-05-23

## 2024-05-23 RX ORDER — PANTOPRAZOLE SODIUM 40 MG/10ML
40 INJECTION, POWDER, LYOPHILIZED, FOR SOLUTION INTRAVENOUS DAILY
Status: DISCONTINUED | OUTPATIENT
Start: 2024-05-23 | End: 2024-05-26 | Stop reason: HOSPADM

## 2024-05-23 RX ORDER — HYDROMORPHONE HYDROCHLORIDE 1 MG/ML
1 INJECTION, SOLUTION INTRAMUSCULAR; INTRAVENOUS; SUBCUTANEOUS ONCE
Status: COMPLETED | OUTPATIENT
Start: 2024-05-23 | End: 2024-05-23

## 2024-05-23 RX ORDER — NOREPINEPHRINE BITARTRATE 0.03 MG/ML
INJECTION, SOLUTION INTRAVENOUS CONTINUOUS PRN
Status: DISCONTINUED | OUTPATIENT
Start: 2024-05-23 | End: 2024-05-23

## 2024-05-23 RX ORDER — CARVEDILOL 25 MG/1
25 TABLET ORAL 2 TIMES DAILY
Status: DISCONTINUED | OUTPATIENT
Start: 2024-05-23 | End: 2024-05-23

## 2024-05-23 RX ORDER — PROPOFOL 10 MG/ML
5-50 INJECTION, EMULSION INTRAVENOUS CONTINUOUS
Status: DISCONTINUED | OUTPATIENT
Start: 2024-05-23 | End: 2024-05-26 | Stop reason: HOSPADM

## 2024-05-23 RX ORDER — SODIUM CHLORIDE 0.9 G/100ML
IRRIGANT IRRIGATION AS NEEDED
Status: DISCONTINUED | OUTPATIENT
Start: 2024-05-23 | End: 2024-05-23 | Stop reason: HOSPADM

## 2024-05-23 RX ORDER — SPIRONOLACTONE 25 MG/1
25 TABLET ORAL 2 TIMES DAILY
Status: DISCONTINUED | OUTPATIENT
Start: 2024-05-23 | End: 2024-05-23

## 2024-05-23 RX ORDER — VASOPRESSIN 20 U/ML
INJECTION PARENTERAL AS NEEDED
Status: DISCONTINUED | OUTPATIENT
Start: 2024-05-23 | End: 2024-05-23

## 2024-05-23 RX ORDER — LIDOCAINE HYDROCHLORIDE 20 MG/ML
1 JELLY TOPICAL ONCE
Status: COMPLETED | OUTPATIENT
Start: 2024-05-23 | End: 2024-05-23

## 2024-05-23 RX ORDER — POLYETHYLENE GLYCOL 3350 17 G/17G
17 POWDER, FOR SOLUTION ORAL DAILY
Status: DISCONTINUED | OUTPATIENT
Start: 2024-05-23 | End: 2024-05-26 | Stop reason: HOSPADM

## 2024-05-23 RX ORDER — INSULIN LISPRO 100 [IU]/ML
5 INJECTION, SOLUTION INTRAVENOUS; SUBCUTANEOUS ONCE
Status: COMPLETED | OUTPATIENT
Start: 2024-05-23 | End: 2024-05-23

## 2024-05-23 RX ORDER — ROCURONIUM BROMIDE 10 MG/ML
INJECTION, SOLUTION INTRAVENOUS AS NEEDED
Status: DISCONTINUED | OUTPATIENT
Start: 2024-05-23 | End: 2024-05-23

## 2024-05-23 RX ORDER — PHENYLEPHRINE HCL IN 0.9% NACL 1 MG/10 ML
SYRINGE (ML) INTRAVENOUS AS NEEDED
Status: DISCONTINUED | OUTPATIENT
Start: 2024-05-23 | End: 2024-05-23

## 2024-05-23 RX ORDER — INSULIN LISPRO 100 [IU]/ML
0-10 INJECTION, SOLUTION INTRAVENOUS; SUBCUTANEOUS EVERY 4 HOURS
Status: DISCONTINUED | OUTPATIENT
Start: 2024-05-23 | End: 2024-05-23

## 2024-05-23 RX ORDER — ONDANSETRON HYDROCHLORIDE 2 MG/ML
4 INJECTION, SOLUTION INTRAVENOUS ONCE
Status: COMPLETED | OUTPATIENT
Start: 2024-05-23 | End: 2024-05-23

## 2024-05-23 RX ORDER — SODIUM CHLORIDE 9 MG/ML
100 INJECTION, SOLUTION INTRAVENOUS CONTINUOUS
Status: DISCONTINUED | OUTPATIENT
Start: 2024-05-23 | End: 2024-05-26

## 2024-05-23 RX ORDER — LANOLIN ALCOHOL/MO/W.PET/CERES
50 CREAM (GRAM) TOPICAL DAILY
COMMUNITY

## 2024-05-23 RX ORDER — ONDANSETRON 4 MG/1
4 TABLET, ORALLY DISINTEGRATING ORAL EVERY 8 HOURS PRN
Status: DISCONTINUED | OUTPATIENT
Start: 2024-05-23 | End: 2024-05-26 | Stop reason: HOSPADM

## 2024-05-23 RX ORDER — SODIUM CHLORIDE, SODIUM LACTATE, POTASSIUM CHLORIDE, CALCIUM CHLORIDE 600; 310; 30; 20 MG/100ML; MG/100ML; MG/100ML; MG/100ML
INJECTION, SOLUTION INTRAVENOUS CONTINUOUS PRN
Status: DISCONTINUED | OUTPATIENT
Start: 2024-05-23 | End: 2024-05-23

## 2024-05-23 RX ORDER — CALCIUM CHLORIDE INJECTION 100 MG/ML
INJECTION, SOLUTION INTRAVENOUS AS NEEDED
Status: DISCONTINUED | OUTPATIENT
Start: 2024-05-23 | End: 2024-05-23

## 2024-05-23 RX ORDER — ACETAMINOPHEN 650 MG/1
650 SUPPOSITORY RECTAL EVERY 4 HOURS PRN
Status: DISCONTINUED | OUTPATIENT
Start: 2024-05-23 | End: 2024-05-26 | Stop reason: HOSPADM

## 2024-05-23 RX ORDER — NORETHINDRONE AND ETHINYL ESTRADIOL 0.5-0.035
KIT ORAL AS NEEDED
Status: DISCONTINUED | OUTPATIENT
Start: 2024-05-23 | End: 2024-05-23

## 2024-05-23 RX ORDER — MORPHINE SULFATE 4 MG/ML
4 INJECTION, SOLUTION INTRAMUSCULAR; INTRAVENOUS ONCE
Status: COMPLETED | OUTPATIENT
Start: 2024-05-23 | End: 2024-05-23

## 2024-05-23 RX ORDER — MORPHINE SULFATE 2 MG/ML
1 INJECTION, SOLUTION INTRAMUSCULAR; INTRAVENOUS EVERY 4 HOURS PRN
Status: DISCONTINUED | OUTPATIENT
Start: 2024-05-23 | End: 2024-05-23

## 2024-05-23 RX ORDER — FENTANYL CITRATE 50 UG/ML
INJECTION, SOLUTION INTRAMUSCULAR; INTRAVENOUS CONTINUOUS PRN
Status: DISCONTINUED | OUTPATIENT
Start: 2024-05-23 | End: 2024-05-23

## 2024-05-23 RX ORDER — MIDAZOLAM HYDROCHLORIDE 1 MG/ML
1 INJECTION, SOLUTION INTRAMUSCULAR; INTRAVENOUS AS NEEDED
Status: DISCONTINUED | OUTPATIENT
Start: 2024-05-23 | End: 2024-05-23

## 2024-05-23 RX ORDER — DEXTROSE 50 % IN WATER (D50W) INTRAVENOUS SYRINGE
12.5
Status: DISCONTINUED | OUTPATIENT
Start: 2024-05-23 | End: 2024-05-26 | Stop reason: HOSPADM

## 2024-05-23 RX ORDER — ALBUTEROL SULFATE 0.83 MG/ML
2.5 SOLUTION RESPIRATORY (INHALATION) EVERY 6 HOURS PRN
Status: DISCONTINUED | OUTPATIENT
Start: 2024-05-23 | End: 2024-05-23

## 2024-05-23 RX ORDER — FENTANYL CITRATE 50 UG/ML
25 INJECTION, SOLUTION INTRAMUSCULAR; INTRAVENOUS
Status: DISCONTINUED | OUTPATIENT
Start: 2024-05-23 | End: 2024-05-26 | Stop reason: HOSPADM

## 2024-05-23 RX ORDER — INSULIN LISPRO 100 [IU]/ML
0-5 INJECTION, SOLUTION INTRAVENOUS; SUBCUTANEOUS EVERY 4 HOURS
Status: DISCONTINUED | OUTPATIENT
Start: 2024-05-23 | End: 2024-05-26 | Stop reason: HOSPADM

## 2024-05-23 RX ORDER — ISOSORBIDE MONONITRATE 30 MG/1
30 TABLET, EXTENDED RELEASE ORAL DAILY
Status: DISCONTINUED | OUTPATIENT
Start: 2024-05-23 | End: 2024-05-23

## 2024-05-23 RX ORDER — ACETAMINOPHEN 160 MG/5ML
650 SOLUTION ORAL EVERY 4 HOURS PRN
Status: DISCONTINUED | OUTPATIENT
Start: 2024-05-23 | End: 2024-05-26 | Stop reason: HOSPADM

## 2024-05-23 RX ORDER — ALBUTEROL SULFATE 0.83 MG/ML
2.5 SOLUTION RESPIRATORY (INHALATION) EVERY 2 HOUR PRN
Status: DISCONTINUED | OUTPATIENT
Start: 2024-05-23 | End: 2024-05-26 | Stop reason: HOSPADM

## 2024-05-23 RX ORDER — ALBUTEROL SULFATE 90 UG/1
2 AEROSOL, METERED RESPIRATORY (INHALATION) EVERY 6 HOURS PRN
Status: DISCONTINUED | OUTPATIENT
Start: 2024-05-23 | End: 2024-05-23

## 2024-05-23 RX ORDER — PROPOFOL 10 MG/ML
INJECTION, EMULSION INTRAVENOUS AS NEEDED
Status: DISCONTINUED | OUTPATIENT
Start: 2024-05-23 | End: 2024-05-23

## 2024-05-23 RX ORDER — DEXTROSE 50 % IN WATER (D50W) INTRAVENOUS SYRINGE
25
Status: DISCONTINUED | OUTPATIENT
Start: 2024-05-23 | End: 2024-05-23

## 2024-05-23 RX ORDER — PRAVASTATIN SODIUM 20 MG/1
20 TABLET ORAL NIGHTLY
Status: DISCONTINUED | OUTPATIENT
Start: 2024-05-23 | End: 2024-05-26 | Stop reason: HOSPADM

## 2024-05-23 RX ADMIN — VASOPRESSIN 2 UNITS: 20 INJECTION INTRAVENOUS at 17:13

## 2024-05-23 RX ADMIN — Medication 300 MCG: at 16:53

## 2024-05-23 RX ADMIN — ROCURONIUM BROMIDE 50 MG: 10 INJECTION INTRAVENOUS at 17:26

## 2024-05-23 RX ADMIN — VASOPRESSIN 0.01 UNITS/MIN: 0.2 INJECTION INTRAVENOUS at 19:34

## 2024-05-23 RX ADMIN — INSULIN HUMAN 10 UNITS: 100 INJECTION, SOLUTION PARENTERAL at 17:50

## 2024-05-23 RX ADMIN — EPHEDRINE SULFATE 10 MG: 50 INJECTION, SOLUTION INTRAVENOUS at 17:11

## 2024-05-23 RX ADMIN — Medication 0.5 MCG/KG/MIN: at 16:38

## 2024-05-23 RX ADMIN — CALCIUM CHLORIDE 0.5 G: 100 INJECTION INTRAVENOUS; INTRAVENTRICULAR at 17:25

## 2024-05-23 RX ADMIN — PRAVASTATIN SODIUM 20 MG: 20 TABLET ORAL at 21:45

## 2024-05-23 RX ADMIN — HYDROMORPHONE HYDROCHLORIDE 1 MG: 1 INJECTION, SOLUTION INTRAMUSCULAR; INTRAVENOUS; SUBCUTANEOUS at 05:36

## 2024-05-23 RX ADMIN — PANTOPRAZOLE SODIUM 40 MG: 40 INJECTION, POWDER, FOR SOLUTION INTRAVENOUS at 21:40

## 2024-05-23 RX ADMIN — PIPERACILLIN SODIUM AND TAZOBACTAM SODIUM 3.38 G: 3; .375 INJECTION, SOLUTION INTRAVENOUS at 21:41

## 2024-05-23 RX ADMIN — SODIUM CHLORIDE 500 ML: 9 INJECTION, SOLUTION INTRAVENOUS at 20:54

## 2024-05-23 RX ADMIN — INSULIN LISPRO 1 UNITS: 100 INJECTION, SOLUTION INTRAVENOUS; SUBCUTANEOUS at 21:34

## 2024-05-23 RX ADMIN — PROPOFOL 25 MCG/KG/MIN: 10 INJECTION, EMULSION INTRAVENOUS at 20:22

## 2024-05-23 RX ADMIN — Medication 40 PERCENT: at 23:20

## 2024-05-23 RX ADMIN — VASOPRESSIN 1 UNITS: 20 INJECTION INTRAVENOUS at 18:08

## 2024-05-23 RX ADMIN — MORPHINE SULFATE 2 MG: 2 INJECTION, SOLUTION INTRAMUSCULAR; INTRAVENOUS at 13:35

## 2024-05-23 RX ADMIN — SODIUM CHLORIDE, POTASSIUM CHLORIDE, SODIUM LACTATE AND CALCIUM CHLORIDE 1500 ML: 600; 310; 30; 20 INJECTION, SOLUTION INTRAVENOUS at 05:36

## 2024-05-23 RX ADMIN — CALCIUM CHLORIDE 0.5 G: 100 INJECTION INTRAVENOUS; INTRAVENTRICULAR at 17:27

## 2024-05-23 RX ADMIN — HYDROMORPHONE HYDROCHLORIDE 1 MG: 1 INJECTION, SOLUTION INTRAMUSCULAR; INTRAVENOUS; SUBCUTANEOUS at 03:03

## 2024-05-23 RX ADMIN — PIPERACILLIN SODIUM AND TAZOBACTAM SODIUM 3.38 G: 3; .375 INJECTION, SOLUTION INTRAVENOUS at 13:35

## 2024-05-23 RX ADMIN — LIDOCAINE HYDROCHLORIDE 1 APPLICATION: 20 JELLY TOPICAL at 05:33

## 2024-05-23 RX ADMIN — SODIUM CHLORIDE: 9 INJECTION, SOLUTION INTRAVENOUS at 16:08

## 2024-05-23 RX ADMIN — MORPHINE SULFATE 4 MG: 4 INJECTION, SOLUTION INTRAMUSCULAR; INTRAVENOUS at 01:54

## 2024-05-23 RX ADMIN — VASOPRESSIN 1 UNITS: 20 INJECTION INTRAVENOUS at 17:54

## 2024-05-23 RX ADMIN — EPHEDRINE SULFATE 20 MG: 50 INJECTION, SOLUTION INTRAVENOUS at 16:57

## 2024-05-23 RX ADMIN — SODIUM CHLORIDE 1000 ML: 9 INJECTION, SOLUTION INTRAVENOUS at 01:58

## 2024-05-23 RX ADMIN — ROCURONIUM BROMIDE 50 MG: 10 INJECTION INTRAVENOUS at 17:03

## 2024-05-23 RX ADMIN — PROPOFOL 90 MG: 10 INJECTION, EMULSION INTRAVENOUS at 16:48

## 2024-05-23 RX ADMIN — VASOPRESSIN 1 UNITS: 20 INJECTION INTRAVENOUS at 17:39

## 2024-05-23 RX ADMIN — Medication 0.02 MCG/KG/MIN: at 18:06

## 2024-05-23 RX ADMIN — EPHEDRINE SULFATE 20 MG: 50 INJECTION, SOLUTION INTRAVENOUS at 17:08

## 2024-05-23 RX ADMIN — SODIUM CHLORIDE: 9 INJECTION, SOLUTION INTRAVENOUS at 17:37

## 2024-05-23 RX ADMIN — SODIUM CHLORIDE 100 ML/HR: 9 INJECTION, SOLUTION INTRAVENOUS at 08:14

## 2024-05-23 RX ADMIN — MORPHINE SULFATE 2 MG: 2 INJECTION, SOLUTION INTRAMUSCULAR; INTRAVENOUS at 07:33

## 2024-05-23 RX ADMIN — LIDOCAINE HYDROCHLORIDE 80 MG: 20 INJECTION, SOLUTION EPIDURAL; INFILTRATION; INTRACAUDAL; PERINEURAL at 16:48

## 2024-05-23 RX ADMIN — Medication 400 MCG: at 16:58

## 2024-05-23 RX ADMIN — INSULIN LISPRO 5 UNITS: 100 INJECTION, SOLUTION INTRAVENOUS; SUBCUTANEOUS at 13:34

## 2024-05-23 RX ADMIN — VANCOMYCIN HYDROCHLORIDE 1500 MG: 1.5 INJECTION, POWDER, LYOPHILIZED, FOR SOLUTION INTRAVENOUS at 05:59

## 2024-05-23 RX ADMIN — CARBOXYMETHYLCELLULOSE SODIUM 2 DROP: 5 SOLUTION/ DROPS OPHTHALMIC at 16:48

## 2024-05-23 RX ADMIN — ONDANSETRON 4 MG: 2 INJECTION INTRAMUSCULAR; INTRAVENOUS at 01:54

## 2024-05-23 RX ADMIN — PIPERACILLIN SODIUM AND TAZOBACTAM SODIUM 4.5 G: 4; .5 INJECTION, SOLUTION INTRAVENOUS at 05:08

## 2024-05-23 RX ADMIN — IOHEXOL 100 ML: 350 INJECTION, SOLUTION INTRAVENOUS at 03:23

## 2024-05-23 RX ADMIN — SODIUM CHLORIDE, POTASSIUM CHLORIDE, SODIUM LACTATE AND CALCIUM CHLORIDE: 600; 310; 30; 20 INJECTION, SOLUTION INTRAVENOUS at 16:02

## 2024-05-23 RX ADMIN — Medication 300 MCG: at 16:48

## 2024-05-23 RX ADMIN — PROPOFOL 25 MCG/KG/MIN: 10 INJECTION, EMULSION INTRAVENOUS at 18:17

## 2024-05-23 RX ADMIN — INSULIN HUMAN 10 UNITS: 100 INJECTION, SOLUTION PARENTERAL at 17:25

## 2024-05-23 RX ADMIN — SODIUM CHLORIDE: 9 INJECTION, SOLUTION INTRAVENOUS at 17:04

## 2024-05-23 ASSESSMENT — ENCOUNTER SYMPTOMS
BACK PAIN: 0
NUMBNESS: 0
NAUSEA: 1
CHEST TIGHTNESS: 0
DYSURIA: 0
AGITATION: 0
VOMITING: 1
ACTIVITY CHANGE: 0
APPETITE CHANGE: 0
MYALGIAS: 0
CHILLS: 0
EYE DISCHARGE: 0
CONFUSION: 0
SHORTNESS OF BREATH: 0
SEIZURES: 0
SPEECH DIFFICULTY: 0
NECK PAIN: 0
FEVER: 0
DIZZINESS: 0
COLOR CHANGE: 0
DIFFICULTY URINATING: 0
COUGH: 0
EYE PAIN: 0
PALPITATIONS: 0
ABDOMINAL PAIN: 1
HEADACHES: 0

## 2024-05-23 ASSESSMENT — PAIN DESCRIPTION - LOCATION
LOCATION: ABDOMEN
LOCATION: ABDOMEN

## 2024-05-23 ASSESSMENT — PAIN DESCRIPTION - PROGRESSION
CLINICAL_PROGRESSION: NOT CHANGED
CLINICAL_PROGRESSION: NOT CHANGED
CLINICAL_PROGRESSION: GRADUALLY IMPROVING

## 2024-05-23 ASSESSMENT — COGNITIVE AND FUNCTIONAL STATUS - GENERAL
MOBILITY SCORE: 6
MOVING FROM LYING ON BACK TO SITTING ON SIDE OF FLAT BED WITH BEDRAILS: TOTAL
STANDING UP FROM CHAIR USING ARMS: TOTAL
EATING MEALS: TOTAL
WALKING IN HOSPITAL ROOM: TOTAL
DAILY ACTIVITIY SCORE: 6
CLIMB 3 TO 5 STEPS WITH RAILING: TOTAL
DRESSING REGULAR UPPER BODY CLOTHING: TOTAL
HELP NEEDED FOR BATHING: TOTAL
DRESSING REGULAR LOWER BODY CLOTHING: TOTAL
PERSONAL GROOMING: TOTAL
MOVING TO AND FROM BED TO CHAIR: TOTAL
TURNING FROM BACK TO SIDE WHILE IN FLAT BAD: TOTAL
TOILETING: TOTAL

## 2024-05-23 ASSESSMENT — PAIN - FUNCTIONAL ASSESSMENT
PAIN_FUNCTIONAL_ASSESSMENT: 0-10
PAIN_FUNCTIONAL_ASSESSMENT: 0-10
PAIN_FUNCTIONAL_ASSESSMENT: CPOT (CRITICAL CARE PAIN OBSERVATION TOOL)
PAIN_FUNCTIONAL_ASSESSMENT: 0-10
PAIN_FUNCTIONAL_ASSESSMENT: 0-10

## 2024-05-23 ASSESSMENT — COLUMBIA-SUICIDE SEVERITY RATING SCALE - C-SSRS
6. HAVE YOU EVER DONE ANYTHING, STARTED TO DO ANYTHING, OR PREPARED TO DO ANYTHING TO END YOUR LIFE?: NO
2. HAVE YOU ACTUALLY HAD ANY THOUGHTS OF KILLING YOURSELF?: NO
1. IN THE PAST MONTH, HAVE YOU WISHED YOU WERE DEAD OR WISHED YOU COULD GO TO SLEEP AND NOT WAKE UP?: NO
2. HAVE YOU ACTUALLY HAD ANY THOUGHTS OF KILLING YOURSELF?: NO
6. HAVE YOU EVER DONE ANYTHING, STARTED TO DO ANYTHING, OR PREPARED TO DO ANYTHING TO END YOUR LIFE?: NO
1. IN THE PAST MONTH, HAVE YOU WISHED YOU WERE DEAD OR WISHED YOU COULD GO TO SLEEP AND NOT WAKE UP?: NO

## 2024-05-23 ASSESSMENT — PAIN SCALES - GENERAL
PAINLEVEL_OUTOF10: 10 - WORST POSSIBLE PAIN
PAINLEVEL_OUTOF10: 9
PAINLEVEL_OUTOF10: 7
PAINLEVEL_OUTOF10: 8

## 2024-05-23 ASSESSMENT — PAIN DESCRIPTION - ONSET: ONSET: GRADUAL

## 2024-05-23 ASSESSMENT — PAIN DESCRIPTION - PAIN TYPE: TYPE: ACUTE PAIN

## 2024-05-23 ASSESSMENT — ACTIVITIES OF DAILY LIVING (ADL): LACK_OF_TRANSPORTATION: NO

## 2024-05-23 ASSESSMENT — PAIN DESCRIPTION - FREQUENCY: FREQUENCY: CONSTANT/CONTINUOUS

## 2024-05-23 NOTE — H&P
"History Of Present Illness  Raegan Ramos is a 88 y.o. 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 Today it Became Severe diffuse abd pain , associated with nausea and vomiting, reports constipation, no BM since Monday.     CT in the ED showed high-grade small bowel obstruction with suggestion of internal hernia. Arterial hypoenhancement of thedilated small bowel loops, with more normal mural enhancement on more delayed imaging suggesting possible early ischemia.  Blood work showed BG was 433, K was 5.7 Bicarb was 20 Lactic was initially 0.7 BUN 30 Scr 1.56 ( baseline 1.06-1.4)    She was Admitted to Hospital medicine, Given ATB, Pain control IV fluids and Antiemeric , A Nasogastric tube has been placed for SBO. With Gen Surgery Cx. She was then Taken to the OR for Diagnostic Laparoscopy - Converted to Open; Mini-Laparotomy; Small Bowel Resection, Op Note: Approximately 290 cm of ischemic small bowel was resected, small bowel anastomosis between the proximal and distal pink/healthy small bowel segments. ( See OP note)      Arrived here in the ICU post proceducre, on the Ventilator, sedated, Vasoppresor: Levophed @ 0.1 mcg/kg/min      Past Medical History  Past Medical History:   Diagnosis Date    Anemia of renal disease 04/24/2023    Chronic diastolic congestive heart failure (Multi) 09/29/2023    Essential (primary) hypertension     Essential hypertension, benign    Long term (current) use of " non-steroidal anti-inflammatories (nsaid)     NSAID long-term use    Melena     Melena    Other chronic pain     Chronic pain    Other forms of dyspnea     Dyspnea on exertion    Other specified soft tissue disorders     Soft tissue mass    Pain in right knee 11/19/2020    Chronic pain of right knee    Pain in unspecified knee     Pain in joint, lower leg    Personal history of other diseases of the musculoskeletal system and connective tissue     History of low back pain    Personal history of other diseases of the musculoskeletal system and connective tissue 10/11/2022    History of spinal stenosis    Personal history of other diseases of the respiratory system     History of allergic rhinitis    Personal history of other diseases of the respiratory system 02/15/2018    History of acute bronchitis    Personal history of other endocrine, nutritional and metabolic disease     History of type 2 diabetes mellitus    Personal history of other endocrine, nutritional and metabolic disease     History of hypokalemia    Type 2 diabetes mellitus without complications (Multi) 08/15/2018    Diabetes mellitus type 2, controlled       Surgical History  Past Surgical History:   Procedure Laterality Date    BACK SURGERY  10/11/2022    Back Surgery    CT ANGIO CORONARY ART WITH HEARTFLOW IF SCORE >30%  8/7/2023    CT HEART CORONARY ANGIOGRAM 8/7/2023 U CT    HYSTERECTOMY  11/16/2017    Hysterectomy    OTHER SURGICAL HISTORY  10/14/2016    Dental Surgery    TOTAL KNEE ARTHROPLASTY  10/11/2022    Knee Replacement        Social History  She reports that she has never smoked. She has never used smokeless tobacco. She reports that she does not drink alcohol and does not use drugs.    Family History  No family history on file.     Allergies  House dust, Hydrocodone, and Mold    Review of Systems   Reason unable to perform ROS: unable post op: Intubated and sedated.        Physical Exam  Constitutional:       Appearance: She is  "ill-appearing.      Interventions: She is sedated and intubated.      Comments: Post op   HENT:      Mouth/Throat:      Mouth: Mucous membranes are dry.   Eyes:      Pupils: Pupils are equal, round, and reactive to light.   Cardiovascular:      Rate and Rhythm: Normal rate. Rhythm irregular.      Heart sounds: Normal heart sounds.   Pulmonary:      Effort: She is intubated.      Comments: Dimished   Abdominal:      General: Bowel sounds are decreased.      Comments: Midline staples    Skin:     General: Skin is cool.          Last Recorded Vitals  Blood pressure 95/50, pulse 63, temperature 35.2 °C (95.4 °F), temperature source Esophageal, resp. rate 14, height 1.651 m (5' 5\"), weight 75.3 kg (166 lb), SpO2 100%.    Relevant Results    Scheduled medications  [Held by provider] apixaban, 5 mg, oral, BID  DULoxetine, 90 mg, oral, Daily  insulin regular, 0-10 Units, intravenous, Once  pantoprazole, 40 mg, intravenous, Daily  piperacillin-tazobactam, 3.375 g, intravenous, q6h  polyethylene glycol, 17 g, oral, Daily  pravastatin, 20 mg, oral, Nightly  sodium chloride, 500 mL, intravenous, Once      Continuous medications  insulin regular infusion, 0-20 Units/hr  norepinephrine, 0-3 mcg/kg/min  propofol, 5-50 mcg/kg/min  sodium chloride 0.9%, 100 mL/hr, Last Rate: 300 mL/hr (05/23/24 1818)  vasopressin, 0.01-0.03 Units/min, Last Rate: 0.01 Units/min (05/23/24 1934)      PRN medications  PRN medications: acetaminophen **OR** acetaminophen **OR** acetaminophen, albuterol, dextrose, glucagon, glucagon, insulin regular, ondansetron ODT **OR** ondansetron, oxygen     Results for orders placed or performed during the hospital encounter of 05/23/24 (from the past 24 hour(s))   CBC and Auto Differential   Result Value Ref Range    WBC 11.1 4.4 - 11.3 x10*3/uL    nRBC 0.0 0.0 - 0.0 /100 WBCs    RBC 4.25 4.00 - 5.20 x10*6/uL    Hemoglobin 13.0 12.0 - 16.0 g/dL    Hematocrit 40.3 36.0 - 46.0 %    MCV 95 80 - 100 fL    MCH 30.6 26.0 " - 34.0 pg    MCHC 32.3 32.0 - 36.0 g/dL    RDW 13.6 11.5 - 14.5 %    Platelets 217 150 - 450 x10*3/uL    Neutrophils % 72.2 40.0 - 80.0 %    Immature Granulocytes %, Automated 0.4 0.0 - 0.9 %    Lymphocytes % 19.1 13.0 - 44.0 %    Monocytes % 7.0 2.0 - 10.0 %    Eosinophils % 1.1 0.0 - 6.0 %    Basophils % 0.2 0.0 - 2.0 %    Neutrophils Absolute 8.01 (H) 1.60 - 5.50 x10*3/uL    Immature Granulocytes Absolute, Automated 0.04 0.00 - 0.50 x10*3/uL    Lymphocytes Absolute 2.12 0.80 - 3.00 x10*3/uL    Monocytes Absolute 0.78 0.05 - 0.80 x10*3/uL    Eosinophils Absolute 0.12 0.00 - 0.40 x10*3/uL    Basophils Absolute 0.02 0.00 - 0.10 x10*3/uL   Comprehensive metabolic panel   Result Value Ref Range    Glucose 214 (H) 74 - 99 mg/dL    Sodium 138 136 - 145 mmol/L    Potassium 4.1 3.5 - 5.3 mmol/L    Chloride 103 98 - 107 mmol/L    Bicarbonate 22 21 - 32 mmol/L    Anion Gap 17 10 - 20 mmol/L    Urea Nitrogen 25 (H) 6 - 23 mg/dL    Creatinine 1.06 (H) 0.50 - 1.05 mg/dL    eGFR 51 (L) >60 mL/min/1.73m*2    Calcium 10.4 (H) 8.6 - 10.3 mg/dL    Albumin 4.2 3.4 - 5.0 g/dL    Alkaline Phosphatase 52 33 - 136 U/L    Total Protein 7.5 6.4 - 8.2 g/dL    AST 17 9 - 39 U/L    Bilirubin, Total 0.5 0.0 - 1.2 mg/dL    ALT 7 7 - 45 U/L   Magnesium   Result Value Ref Range    Magnesium 2.20 1.60 - 2.40 mg/dL   Lipase   Result Value Ref Range    Lipase 9 9 - 82 U/L   Troponin I, High Sensitivity   Result Value Ref Range    Troponin I, High Sensitivity 13 0 - 13 ng/L   Lactate   Result Value Ref Range    Lactate 0.7 0.4 - 2.0 mmol/L   Blood Culture    Specimen: Peripheral Venipuncture; Blood culture   Result Value Ref Range    Blood Culture Loaded on Instrument - Culture in progress    Blood Culture    Specimen: Peripheral Venipuncture; Blood culture   Result Value Ref Range    Blood Culture Loaded on Instrument - Culture in progress    POCT GLUCOSE   Result Value Ref Range    POCT Glucose 105 (H) 74 - 99 mg/dL   Hemoglobin A1C   Result Value  Ref Range    Hemoglobin A1C 6.0 (H) see below %    Estimated Average Glucose 126 Not Established mg/dL   Urinalysis with Reflex Culture and Microscopic   Result Value Ref Range    Color, Urine Colorless (N) Light-Yellow, Yellow, Dark-Yellow    Appearance, Urine Clear Clear    Specific Gravity, Urine >1.050 (N) 1.005 - 1.035    pH, Urine 8.0 5.0, 5.5, 6.0, 6.5, 7.0, 7.5, 8.0    Protein, Urine 30 (1+) (A) NEGATIVE, 10 (TRACE), 20 (TRACE) mg/dL    Glucose, Urine OVER (4+) (A) Normal mg/dL    Blood, Urine NEGATIVE NEGATIVE    Ketones, Urine 10 (1+) (A) NEGATIVE mg/dL    Bilirubin, Urine NEGATIVE NEGATIVE    Urobilinogen, Urine Normal Normal mg/dL    Nitrite, Urine NEGATIVE NEGATIVE    Leukocyte Esterase, Urine NEGATIVE NEGATIVE   Urinalysis Microscopic   Result Value Ref Range    WBC, Urine NONE 1-5, NONE /HPF    RBC, Urine NONE NONE, 1-2, 3-5 /HPF    Squamous Epithelial Cells, Urine 26-50 (1+) Reference range not established. /HPF   POCT GLUCOSE   Result Value Ref Range    POCT Glucose 406 (H) 74 - 99 mg/dL   POCT GLUCOSE   Result Value Ref Range    POCT Glucose 443 (H) 74 - 99 mg/dL   Basic metabolic panel   Result Value Ref Range    Glucose 433 (H) 74 - 99 mg/dL    Sodium 133 (L) 136 - 145 mmol/L    Potassium 5.7 (H) 3.5 - 5.3 mmol/L    Chloride 103 98 - 107 mmol/L    Bicarbonate 20 (L) 21 - 32 mmol/L    Anion Gap 16 10 - 20 mmol/L    Urea Nitrogen 30 (H) 6 - 23 mg/dL    Creatinine 1.56 (H) 0.50 - 1.05 mg/dL    eGFR 32 (L) >60 mL/min/1.73m*2    Calcium 8.5 (L) 8.6 - 10.3 mg/dL   POCT GLUCOSE   Result Value Ref Range    POCT Glucose 303 (H) 74 - 99 mg/dL   Blood Gas Arterial Full Panel Unsolicited   Result Value Ref Range    POCT pH, Arterial 7.22 (LL) 7.38 - 7.42 pH    POCT pCO2, Arterial 36 (L) 38 - 42 mm Hg    POCT pO2, Arterial 243 (H) 85 - 95 mm Hg    POCT SO2, Arterial 100 94 - 100 %    POCT Oxy Hemoglobin, Arterial 96.4 94.0 - 98.0 %    POCT Hematocrit Calculated, Arterial 30.0 (L) 36.0 - 46.0 %    POCT Sodium,  Arterial 137 136 - 145 mmol/L    POCT Potassium, Arterial 4.4 3.5 - 5.3 mmol/L    POCT Chloride, Arterial 111 (H) 98 - 107 mmol/L    POCT Ionized Calcium, Arterial 1.54 (H) 1.10 - 1.33 mmol/L    POCT Glucose, Arterial 299 (H) 74 - 99 mg/dL    POCT Lactate, Arterial 4.0 (HH) 0.4 - 2.0 mmol/L    POCT Base Excess, Arterial -12.1 (L) -2.0 - 3.0 mmol/L    POCT HCO3 Calculated, Arterial 14.7 (L) 22.0 - 26.0 mmol/L    POCT Hemoglobin, Arterial 10.0 (L) 12.0 - 16.0 g/dL    POCT Anion Gap, Arterial 16 10 - 25 mmo/L    Patient Temperature 37.0 degrees Celsius   Coox Panel, Arterial Unsolicited   Result Value Ref Range    POCT Hemoglobin, Arterial 10.0 (L) 12.0 - 16.0 g/dL    POCT Oxy Hemoglobin, Arterial 96.4 94.0 - 98.0 %    POCT Carboxyhemoglobin, Arterial 1.9 %    POCT Methemoglobin, Arterial 1.1 0.0 - 1.5 %    POCT Deoxy Hemoglobin, Arterial 0.5 0.0 - 5.0 %   Blood Gas Arterial Full Panel   Result Value Ref Range    POCT pH, Arterial 7.23 (LL) 7.38 - 7.42 pH    POCT pCO2, Arterial 41 38 - 42 mm Hg    POCT pO2, Arterial 190 (H) 85 - 95 mm Hg    POCT SO2, Arterial 100 94 - 100 %    POCT Oxy Hemoglobin, Arterial 97.2 94.0 - 98.0 %    POCT Hematocrit Calculated, Arterial 30.0 (L) 36.0 - 46.0 %    POCT Sodium, Arterial 137 136 - 145 mmol/L    POCT Potassium, Arterial 4.3 3.5 - 5.3 mmol/L    POCT Chloride, Arterial 111 (H) 98 - 107 mmol/L    POCT Ionized Calcium, Arterial 1.27 1.10 - 1.33 mmol/L    POCT Glucose, Arterial 197 (H) 74 - 99 mg/dL    POCT Lactate, Arterial 2.8 (H) 0.4 - 2.0 mmol/L    POCT Base Excess, Arterial -9.7 (L) -2.0 - 3.0 mmol/L    POCT HCO3 Calculated, Arterial 17.2 (L) 22.0 - 26.0 mmol/L    POCT Hemoglobin, Arterial 9.9 (L) 12.0 - 16.0 g/dL    POCT Anion Gap, Arterial 13 10 - 25 mmo/L    Patient Temperature 37.0 degrees Celsius    FiO2 50 %      XR chest 1 view    Result Date: 5/23/2024  Interpreted By:  Camden Romeo, STUDY: XR CHEST 1 VIEW;  5/23/2024 11:30 am   INDICATION: Signs/Symptoms:ng  placement.   COMPARISON: Chest radiograph 05/23/2024   ACCESSION NUMBER(S): [id number]   ORDERING CLINICIAN: PEDRO NOLAN   FINDINGS: AP radiograph of the chest was provided.   DEVICES: Interval readjustment previously noted feeding tube with the side port now below the diaphragm and the tip in the proximal gastric body.   CARDIOMEDIASTINAL SILHOUETTE: Cardiomediastinal silhouette is normal in size and configuration.No significant atherosclerotic calcification.   LUNGS: No focal consolidation. No pneumothorax. No pleural effusion.   BONES: Postoperative changes thoracolumbar fusion. Partially imaged stimulation device leads overlying the midline.       1.  Interval readjustment feeding tube, now with normal position.     Signed by: Camden Romeo 5/23/2024 11:35 AM Dictation workstation:   [id number]    XR chest 1 view    Result Date: 5/23/2024  Interpreted By:  Kelvin Wolf, STUDY: XR CHEST 1 VIEW;  5/23/2024 7:13 am   INDICATION: Signs/Symptoms:NGT placement.   COMPARISON: 05/23/2024 chest x-ray and CT chest   ACCESSION NUMBER(S): [id number]   ORDERING CLINICIAN: EJ COFFEY   FINDINGS: G-tube tip at the level of the left hemidiaphragm. Proximal port is in the esophagus. Recommend repositioning.       CARDIOMEDIASTINAL SILHOUETTE: Cardiomegaly stable. Tortuous aorta. Aortic size measurement is not reliable on this examination.   LUNGS: Lungs are clear.   ABDOMEN: No remarkable upper abdominal findings.   BONES: No acute osseous changes.       1.  NG tube tip at the level of the left hemidiaphragm with proximal port in the mid esophagus. Recommend advancement. Heart lungs are otherwise similar.     MACRO: None   Signed by: Kelvin Wolf 5/23/2024 8:13 AM Dictation workstation:   [id number]    XR chest abdomen for OG NG placement    Result Date: 5/23/2024  Interpreted By:  Rajesh Godoy, STUDY: XR CHEST ABDOMEN FOR OG NG PLACEMENT; ;  5/23/2024 6:31 am   INDICATION: Signs/Symptoms:NGT  placement attempt #2.   COMPARISON: Chest radiography of 05/20/2024. Correlation also made to CT chest, abdomen and pelvis of 05/20/2024.   ACCESSION NUMBER(S): [id number]   ORDERING CLINICIAN: EJ COFFEY   FINDINGS: 2 AP views of the chest and abdomen.   Enteric tube approximates the tortuous course of the esophagus with tip in the region of the diaphragmatic hiatus in a sliding hiatal hernia sac, based on correlation with the prior CT. The tube is kinked at the site of its side-port in a focal region of left lateral excursion and kinking of the tortuous esophagus in the inferior mediastinum. Advancement is needed.   Stable cardiomegaly and aortic tortuosity and atherosclerotic calcification.   Minor patchy airspace opacities in the lung bases probably relate to atelectasis. Lungs otherwise appear clear. No pleural effusion or pneumothorax.       Please see above.     MACRO: None   Signed by: Rajesh Godoy 5/23/2024 6:54 AM Dictation workstation:   [id number]    XR chest abdomen for OG NG placement    Result Date: 5/23/2024  Interpreted By:  Rajesh Godoy, STUDY: XR CHEST ABDOMEN FOR OG NG PLACEMENT; ;  5/23/2024 6:12 am   INDICATION: Signs/Symptoms:ng.   COMPARISON: 08/05/2023.   ACCESSION NUMBER(S): [id number]   ORDERING CLINICIAN: EJ COFFEY   FINDINGS: AP semi upright view of the chest.   Enteric tube approximates the proximal esophagus and is looped upon itself in the region of the distal esophagus with its distal end oriented cephalad with tip just proximal to the thoracic inlet, and side-port in the mid-esophagus. Replacement is needed.   Stably enlarged cardiac silhouette and aortic tortuosity with athero sclerotic calcification of the arch.   Patchy bibasilar airspace opacities may relate to atelectasis or pneumonia. No pleural effusion or pneumothorax.       Please see above.     MACRO: None   Signed by: Rajesh Godoy 5/23/2024 6:19 AM Dictation workstation:   [id number]    CT angio chest  abdomen pelvis    Result Date: 5/23/2024  Interpreted By:  Rajesh Godoy, STUDY: CT ANGIO CHEST ABDOMEN PELVIS; ;  5/23/2024 3:53 am   INDICATION: Signs/Symptoms:sob. Abdominal pain and vomiting.   COMPARISON: CT abdomen and pelvis of 07/05/2023.   ACCESSION NUMBER(S): [id number]   ORDERING CLINICIAN: EJ COFFEY   TECHNIQUE: Noncontrast CT of the chest, abdomen and pelvis was followed by CT angiography of the chest, abdomen and pelvis after IV administration of 100 cc Omnipaque 350. Multiplanar, MIP and 3D reformations.   FINDINGS: CHEST:   VESSELS: No evidence of aortic intramural hematoma or dissection. Thoracic aorta is tortuous with moderate partially calcified atherosclerotic plaque in thoracic aorta and arch vessels without significant luminal narrowing. Evaluation of the origins of the celiac axis and SMA is limited by artifact from spinal hardware. There is atherosclerotic plaque at their origins without definite hemodynamically significant narrowing. Moderate to severe focal narrowing of the proximal renal arteries. Mild narrowing of internal iliac, inferior and superior gluteal arteries. No aneurysmal dilation. No other definite hemodynamically significant narrowing of the imaged systemic arteries. Main pulmonary artery is dilated compatible with pulmonary arterial hypertension. No acute pulmonary embolus. Coronary artery calcifications are present; please note that study is not optimized for evaluation of coronary arteries. HEART: Enlarged. Mitral annular calcification. No pericardial effusion. MEDIASTINUM AND JERRICA: No pathologically enlarged thoracic lymph nodes. Circumferential mural thickening of the esophagus with submucosal edema compatible with esophagitis; underlying neoplasm is not excluded. Recommend endoscopic correlation or further evaluation nonemergent outpatient fluoroscopic barium swallow exam. A yellow alert was sent. LUNG, PLEURA, LARGE AIRWAYS: Within normal limits. CHEST WALL  AND LOWER NECK: Within normal limits.   ABDOMEN:   BONES: No acute osseous abnormality. Thoracolumbar laminectomy infusion. ABDOMINAL WALL: Within normal limits.   LIVER: Irregular hepatic surface suspicious for cirrhosis. Redemonstrated small cyst in the right lobe. BILE DUCTS: Normal caliber. GALLBLADDER: No calcified gallstones. No wall thickening. PANCREAS: Pancreatic atrophy, with similar prominence of the pancreatic duct. Otherwise, normal. SPLEEN: Within normal limits. ADRENALS: Nodular thickening of the adrenal glands most likely relates to adenomatous change. KIDNEYS: Vascular calcifications in the left kidney. Otherwise, normal. URETERS: No hydroureter.   PELVIS:   REPRODUCTIVE ORGANS: No pelvic mass or significant free pelvic fluid. BLADDER: Within normal limits.   RETROPERITONEUM: Within normal limits. BOWEL: Stomach is decompressed and partially obscured by artifact and limited for evaluation. Small bowel loops in the left upper quadrant appear normal. The terminal ileum appears normal. There are dilated loops of small bowel in the right upper quadrant and central abdomen which appears somewhat radially oriented upheld a point of central tethering, with tapering of its proximal and distal aspects and mesenteric swirling best seen on coronal imaging (series 603, images 12-81), with mesenteric edema, concerning for internal hernia with closed loop obstruction. There appears to be arterial hypoenhancement of the dilated small bowel loops, with more normal mural enhancement on more delayed imaging suggesting possible early ischemia. No definite pneumatosis intestinalis. Surgical evaluation is recommended.  Appendix is not identified with certainty. PERITONEUM: Moderate volume of ascites. No pneumoperitoneum or loculated intraperitoneal collection.       Small bowel loops in the left upper quadrant appear normal. The terminal ileum appears normal. There are dilated loops of small bowel in the right upper  "quadrant and central abdomen which appears somewhat radially oriented upheld a point of central tethering, with tapering of its proximal and distal aspects and mesenteric swirling best seen on coronal imaging (series 603, images 12-81), with mesenteric edema, concerning for internal hernia with closed loop obstruction. There appears to be arterial hypoenhancement of the dilated small bowel loops, with more normal mural enhancement on more delayed imaging suggesting possible early ischemia. No definite pneumatosis intestinalis. Surgical evaluation is recommended.   No evidence of acute abnormality of the imaged systemic arterial vasculature. No acute pulmonary embolus.   Moderate volume of ascites.   No definite evidence of acute pathology in the chest. Dilated main pulmonary artery compatible with pulmonary arterial hypertension. Cardiomegaly.   Circumferential mural thickening of the esophagus with submucosal edema compatible with esophagitis; underlying neoplasm is not excluded. Recommend endoscopic correlation or further evaluation nonemergent outpatient fluoroscopic barium swallow exam. A yellow alert was sent.     MACRO: Critical Finding:  See findings. Notification was initiated on 5/23/2024 at 4:26 am by  Rajesh Godoy.  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), s/p implantation of a surgical paddle spinal cord stimulator on March 29, 2017, followed by IPG implantation the next day admitted to the ICU for   #Septic shock  # 2/2 Bowel Ischemia, SBO  # Acute Resp Failure  # Metabolic " "encephalopathy  # Lactic Acidosis  # Hyperglycemia  # MATEUSZ, Acute on Chronic     Assessment/Plan   Principal Problem:    SBO (small bowel obstruction) (Multi)    Neuro:   -Keep intubated and sedation overnight  -pain control, PRN pain medication,  -Keep pt normothermic   -Q1 neuro check per ICU protocol  -CAM ICU score q-shift  -Sleep/wake cycle hygiene  -Delirium precaution    CV:  Shock  Hx  Aortic Stenosis, CHF . Afib ( on Eliquis)   -Continue vasopressors, MAP goal >65mmhg   -Levophed and Vasopressin with MAP goals   -Gently hydration    -Check TTE  - repeat RFP  -Hold ATC  -Continuous cardiac monitoring and hourly vital signs    -Electrolytes daily monitoring     PULM:   ARF  -continue on Vent  -Trend ABG  -SBT when tolerated and able   -supplemental oxygen to keep spo2 > 92%     GI/FEN/Endo:     -NPO,   -Daily electrolyte as replete as needed    -hold Insulin drip recent BG was 157, POCT, ISS Q4, Hypoglycemia protocol   -Lactic Improving      Renal/: MATEUSZ, acute on chronic   -Strict I&O  -Daily weights  -Renal Panel in am  -ICU electrolyte replacement protocol- keep potassium >4, magnesium >2. Trend daily BMP, mag, phos.  -Avoid Nephrotoxic Agents, NSAIDs/ Renal dose medications   -Garcia, for strict I/O      Heme/ID:     -SCDs  -pDVT- (on Eliquis  ),   -ATB: Zosyn     MSK/Integumentary:  Midline incision\" Wound Care: site Is clean no redness or drainage   Reposition, Pressure relieving measures  PT/OT-hold for now     Lines: IV, Garcia. Arterial Line , CVC                                                                                                                                                               Social/family/dispo: ICU. POA: Son is Lorenzo who lives with the patient, Spoke to Lorenzo Extensively- Wish to Keep the Code Status FULL. There is also a daughter named Raisa (  based in [place])   Code; Full.     I spent 100 minutes in the professional and overall care of this patient.      Natasha Summers, " APRN-CNP

## 2024-05-23 NOTE — CONSULTS
Reason For Consult  SBO, concern for bowel ischemia, internal hernia     History Of Present Illness  Raegan Ramos is a 88 y.o. female presenting with abdominal with associated emesis x 4 since 10pm. Patient states she had episode of emesis on Monday, that's also when she had last BM, but takes stool softeners daily, this is not uncommon. Was feeling otherwise well throughout the week, no changes in appetite until she started feeling sharp abdominal last evening. Upon presentation to the ED found to be hypothermic, temp 35.1. CT scan completed in the ED which shows concerns as listed above, for this surgery was consulted.    Of note patient has history of DVT on Eliquis last dose taken the morning of 5/22. WBC 11.1, H/Hct stable, creatinine 1.06, EGFR 51, Na 138, , lactate 0.7     Past Medical History  She has a past medical history of Anemia of renal disease (04/24/2023), Chronic diastolic congestive heart failure (Multi) (09/29/2023), Essential (primary) hypertension, Long term (current) use of non-steroidal anti-inflammatories (nsaid), Melena, Other chronic pain, Other forms of dyspnea, Other specified soft tissue disorders, Pain in right knee (11/19/2020), Pain in unspecified knee, Personal history of other diseases of the musculoskeletal system and connective tissue, Personal history of other diseases of the musculoskeletal system and connective tissue (10/11/2022), Personal history of other diseases of the respiratory system, Personal history of other diseases of the respiratory system (02/15/2018), Personal history of other endocrine, nutritional and metabolic disease, Personal history of other endocrine, nutritional and metabolic disease, and Type 2 diabetes mellitus without complications (Multi) (08/15/2018).    Surgical History  She has a past surgical history that includes Other surgical history (10/14/2016); Hysterectomy (11/16/2017); Back surgery (10/11/2022); Total knee arthroplasty  "(10/11/2022); and CT angio coronary art with heartflow if score >30% (8/7/2023). Appendectomy.      Social History  She reports that she has never smoked. She has never used smokeless tobacco. She reports that she does not drink alcohol and does not use drugs.    Family History  No family history on file.     Allergies  House dust, Hydrocodone-acetaminophen, and Mold      Physical Exam  PE:  Constitutional: A&Ox3, calm and cooperative  Eyes: clear sclera   ENMT: Moist mucous membranes  Head/Neck: Neck supple  Cardiovascular: Normal rate and regular rhythm.  Respiratory/Thorax:Good symmetric chest expansion.   Gastrointestinal: Abdomen distended, compressible, tenderness throughout abdomen with palpation, NGT placed at the bedside- brown bilous output   Genitourinary: Voiding independently, purewick in place   Musculoskeletal: ROM intact  Extremities: No peripheral edema,  Neurological: A&Ox3  Psychological: Appropriate mood and behavior  Skin: Warm and dry          Last Recorded Vitals  Blood pressure 169/85, pulse 64, temperature 35.1 °C (95.2 °F), temperature source Oral, resp. rate 18, height 1.651 m (5' 5\"), weight 75.3 kg (166 lb), SpO2 96%.    Relevant Results  Results for orders placed or performed during the hospital encounter of 05/23/24 (from the past 24 hour(s))   CBC and Auto Differential   Result Value Ref Range    WBC 11.1 4.4 - 11.3 x10*3/uL    nRBC 0.0 0.0 - 0.0 /100 WBCs    RBC 4.25 4.00 - 5.20 x10*6/uL    Hemoglobin 13.0 12.0 - 16.0 g/dL    Hematocrit 40.3 36.0 - 46.0 %    MCV 95 80 - 100 fL    MCH 30.6 26.0 - 34.0 pg    MCHC 32.3 32.0 - 36.0 g/dL    RDW 13.6 11.5 - 14.5 %    Platelets 217 150 - 450 x10*3/uL    Neutrophils % 72.2 40.0 - 80.0 %    Immature Granulocytes %, Automated 0.4 0.0 - 0.9 %    Lymphocytes % 19.1 13.0 - 44.0 %    Monocytes % 7.0 2.0 - 10.0 %    Eosinophils % 1.1 0.0 - 6.0 %    Basophils % 0.2 0.0 - 2.0 %    Neutrophils Absolute 8.01 (H) 1.60 - 5.50 x10*3/uL    Immature " Granulocytes Absolute, Automated 0.04 0.00 - 0.50 x10*3/uL    Lymphocytes Absolute 2.12 0.80 - 3.00 x10*3/uL    Monocytes Absolute 0.78 0.05 - 0.80 x10*3/uL    Eosinophils Absolute 0.12 0.00 - 0.40 x10*3/uL    Basophils Absolute 0.02 0.00 - 0.10 x10*3/uL   Comprehensive metabolic panel   Result Value Ref Range    Glucose 214 (H) 74 - 99 mg/dL    Sodium 138 136 - 145 mmol/L    Potassium 4.1 3.5 - 5.3 mmol/L    Chloride 103 98 - 107 mmol/L    Bicarbonate 22 21 - 32 mmol/L    Anion Gap 17 10 - 20 mmol/L    Urea Nitrogen 25 (H) 6 - 23 mg/dL    Creatinine 1.06 (H) 0.50 - 1.05 mg/dL    eGFR 51 (L) >60 mL/min/1.73m*2    Calcium 10.4 (H) 8.6 - 10.3 mg/dL    Albumin 4.2 3.4 - 5.0 g/dL    Alkaline Phosphatase 52 33 - 136 U/L    Total Protein 7.5 6.4 - 8.2 g/dL    AST 17 9 - 39 U/L    Bilirubin, Total 0.5 0.0 - 1.2 mg/dL    ALT 7 7 - 45 U/L   Magnesium   Result Value Ref Range    Magnesium 2.20 1.60 - 2.40 mg/dL   Lipase   Result Value Ref Range    Lipase 9 9 - 82 U/L   Troponin I, High Sensitivity   Result Value Ref Range    Troponin I, High Sensitivity 13 0 - 13 ng/L   Lactate   Result Value Ref Range    Lactate 0.7 0.4 - 2.0 mmol/L     CT angio chest abdomen pelvis   Final Result   Small bowel loops in the left upper quadrant appear normal. The   terminal ileum appears normal. There are dilated loops of small bowel   in the right upper quadrant and central abdomen which appears   somewhat radially oriented upheld a point of central tethering, with   tapering of its proximal and distal aspects and mesenteric swirling   best seen on coronal imaging (series 603, images [id number]), with   mesenteric edema, concerning for internal hernia with closed loop   obstruction. There appears to be arterial hypoenhancement of the   dilated small bowel loops, with more normal mural enhancement on more   delayed imaging suggesting possible early ischemia. No definite   pneumatosis intestinalis. Surgical evaluation is recommended.        No  evidence of acute abnormality of the imaged systemic arterial   vasculature. No acute pulmonary embolus.        Moderate volume of ascites.        No definite evidence of acute pathology in the chest. Dilated main   pulmonary artery compatible with pulmonary arterial hypertension.   Cardiomegaly.        Circumferential mural thickening of the esophagus with submucosal   edema compatible with esophagitis; underlying neoplasm is not   excluded. Recommend endoscopic correlation or further evaluation   nonemergent outpatient fluoroscopic barium swallow exam. A yellow   alert was sent.             MACRO:   Critical Finding:  See findings. Notification was initiated on   5/23/2024 at 4:26 am by  Rajesh Godoy.  (**-YCF-**) Instructions:        Signed by: Rajesh Godoy 5/23/2024 4:27 AM   Dictation workstation:   [id number]      XR chest abdomen for OG NG placement    (Results Pending)        Assessment/Plan     Raegan Ramos is a 88 y.o. female presenting with abdominal with associated emesis x 4 since 10pm. Patient states she had episode of emesis on Monday, that's also when she had last BM, but takes stool softeners daily, this is not uncommon. Was feeling otherwise well throughout the week, no changes in appetite until she started feeling sharp abdominal last evening. Upon presentation to the ED found to be hypothermic, temp 35.1. CT scan completed in the ED which shows concerns as listed above, for this surgery was consulted.    Of note patient has history of DVT on Eliquis last dose taken the morning of 5/22. WBC 11.1, H/Hct stable, creatinine 1.06, EGFR 51, Na 138, , lactate 0.7     Plan:  - admit to medicine  - NGT to [facility]  - IVF  - NPO   - PRN pain medications  - PRN antiemetics  - trend labs: CBC, lactate, BMP  - Zosyn/Vanc  - Hold Eliquis      Dispo: NPO, NGT to [facility], further evaluation from attending surgeon pending.     I spent 45 minutes in the professional and overall care of this patient.      Gabbie  TAURUS Mata, APRN-CNP

## 2024-05-23 NOTE — ANESTHESIA PROCEDURE NOTES
Arterial Line:    Date/Time: 5/23/2024 4:55 PM    Staffing  Performed: attending   Authorized by: Buddy Chavis MD    Performed by: MARIA A Rodrigez    An arterial line was placed. Procedure performed using ultrasound guidance.in the OR for the following indication(s): continuous blood pressure monitoring and blood sampling needed.    A 20 gauge (size), 1 and 3/4 inch (length), Angiocath (type) catheter was placed into the Left radial artery, secured by tape,   Seldinger technique used.  Events:  patient tolerated procedure well with no complications.

## 2024-05-23 NOTE — ANESTHESIA PROCEDURE NOTES
Peripheral IV  Date/Time: 5/23/2024 5:00 PM      Placement  Needle size: 18 G  Laterality: right  Location: hand  Local anesthetic: none  Site prep: alcohol  Technique: anatomical landmarks  Attempts: 1

## 2024-05-23 NOTE — ANESTHESIA PREPROCEDURE EVALUATION
Patient: Raegan Ramos    Procedure Information       Date: 05/23/24    Procedure: Diagnostic Laparoscopy    Location: [facility]    Surgeons: Den Brush MD          89 yo F hx SBO s/p NGT, hypothermic, ?sepsis, CHF, severe pHTN, mod aortic stenosis, Dm2 (BS 430s in ER with minimal tx), LBBB, MATEUSZ (K 5.7), asthma, GERD, hx DVT, Afib (eliquis), hx breast CA.  Last echo 5/2023 showed EF 45-50, moderate aortic stenosis (MAGALI 1.2, mean 13), RVSP 72, mod aortic stenosis, mod MR.    Relevant Problems   Cardiac   (+) Abnormal ECG   (+) Aortic valve stenosis   (+) Atypical chest pain   (+) Benign hypertension   (+) Chest pain   (+) Chronic atrial fibrillation (Multi)   (+) Chronic diastolic congestive heart failure (Multi)   (+) Congestive heart failure (Multi)   (+) Essential (primary) hypertension   (+) Heart murmur   (+) Hyperlipidemia   (+) Mitral valve insufficiency   (+) Mixed hyperlipidemia   (+) Moderate aortic valve stenosis   (+) Thoracic back pain   (+) Tricuspid valve insufficiency      Pulmonary   (+) Asthma (HHS-HCC)   (+) Dyspnea on exertion   (+) Mild asthma with acute exacerbation (HHS-HCC)      Neuro   (+) Carpal tunnel syndrome of right wrist   (+) Chronic headache disorder   (+) Chronic headaches   (+) Depression   (+) Lumbar radiculopathy      GI   (+) Dysphagia   (+) Gastroesophageal reflux disease   (+) Gastroesophageal reflux disease without esophagitis      /Renal   (+) Calculus of kidney   (+) Left nephrolithiasis      Endocrine   (+) Background diabetic retinopathy (Multi)   (+) Diabetes mellitus type 2 in nonobese (Multi)   (+) Diabetes mellitus type 2 without retinopathy (Multi)   (+) Nonproliferative diabetic retinopathy of left eye (Multi)   (+) Type 2 diabetes mellitus with mild nonproliferative diabetic retinopathy without macular edema, left eye (Multi)      Hematology   (+) Acute deep vein thrombosis (DVT) of popliteal vein of left lower extremity (Multi)   (+) Anemia   (+)  Anemia of renal disease   (+) Chronic anemia   (+) Iron deficiency anemia due to chronic blood loss      Musculoskeletal   (+) Carpal tunnel syndrome of right wrist   (+) Chronic low back pain   (+) Chronic pain syndrome   (+) OA (osteoarthritis) of knee   (+) Spinal stenosis of lumbar region      HEENT   (+) Bilateral hearing loss      GYN   (+) Adenocarcinoma, breast, right (Multi)   (+) Breast cancer (Multi)   (+) Invasive ductal carcinoma of breast, stage 1, left (Multi)   (+) Malignant neoplasm of breast (Multi)       Clinical information reviewed:    Allergies  Meds               NPO Detail:  No data recorded     PHYSICAL EXAM    Anesthesia Plan    History of general anesthesia?: yes  History of complications of general anesthesia?: no    ASA 4     intravenous induction   Postoperative administration of opioids is intended.  Anesthetic plan and risks discussed with patient.

## 2024-05-23 NOTE — OP NOTE
Diagnostic Laparoscopy Operative Note     Date: 2024  OR Location: [facility] OR    Name: Raegan Ramos, : 1935, Age: 88 y.o., MRN: [id number], Sex: female    Diagnosis  Pre-op Diagnosis     * SBO (small bowel obstruction) (Multi) [K56.609] Post-op Diagnosis     * SBO (small bowel obstruction) (Multi) Intestinal ischemia      Procedures  Diagnostic Laparoscopy  Mini Laparotomy  Partial Small Bowel Resection       Surgeons      * Den Brush - Primary    Resident/Fellow/Other Assistant:  Surgeons and Role:  * No surgeons found with a matching role *    Procedure Summary  Anesthesia: General  ASA: IV  Anesthesia Staff: Anesthesiologist: Buddy Chavis MD  C-AA: MARIA A Rodrigez; MARIA A Phillips  Estimated Blood Loss: 10 mL  Intra-op Medications:   Administrations occurring from 1630 to 1845 on 24:   Medication Name Total Dose   sodium chloride 0.9 % irrigation solution 2,000 mL   BUPivacaine HCl (Marcaine) 0.5 % (5 mg/mL) 20 mL, lidocaine PF (Xylocaine) 10 mg/mL (1 %) 20 mL syringe Cannot be calculated   sodium chloride 0.9% infusion 168.33 mL              Anesthesia Record               Intraprocedure I/O Totals          Intake    Norepinephrine Drip 0.00 mL    The total shown is the total volume documented since Anesthesia Start was filed.    Phenylephrine Drip 0.00 mL    The total shown is the total volume documented since Anesthesia Start was filed.    sodium chloride 0.9% infusion 2000.00 mL    Total Intake 2000 mL       Output    Urine 350 mL    Total Output 350 mL       Net    Net Volume 1650 mL          Specimen:   ID Type Source Tests Collected by Time   1 : SMALL INTESTINE Tissue SMALL BOWEL /INTESTINE SEGMENTAL RESECTION SURGICAL PATHOLOGY EXAM Den Brush MD 2024 1744        Staff:   Circulator: Catalina Lesterub Person: Bertha  Circulator: Nisa Boo Scrub: Bhavya         Drains and/or Catheters:   NG/OG/Feeding Tube NG - Gratiot sump 14 Fr Left nostril (Active)    Tube Status Low intermittent suction 05/23/24 1626   Placement Verification Gastric contents 05/23/24 0554   Gastric Aspirate Grassy green (gastric) 05/23/24 0554   Site Assessment Clean;Dry;Intact 05/23/24 1626   Drainage Appearance Bile;Green;Brown 05/23/24 0554   Tube Securement Taped to nostril center 05/23/24 0554       Urethral Catheter Non-latex 16 Fr. (Active)       Indications: Raegan Ramos is an 88 y.o. female who is having surgery for SBO (small bowel obstruction) (Multi) [K56.609].     The patient was seen in the preoperative area. The risks, benefits, complications, treatment options, non-operative alternatives, expected recovery and outcomes were discussed with the patient. The possibilities of reaction to medication, pulmonary aspiration, injury to surrounding structures, bleeding, recurrent infection, the need for additional procedures, failure to diagnose a condition, and creating a complication requiring transfusion or operation were discussed with the patient. The patient concurred with the proposed plan, giving informed consent.  The site of surgery was properly noted/marked if necessary per policy. The patient has been actively warmed in preoperative area. Preoperative antibiotics have been ordered and given within 1 hours of incision. Venous thrombosis prophylaxis have been ordered including bilateral sequential compression devices    Procedure Details:      Patient is a 88-year-old female with multiple medical comorbid conditions.  She comes to the hospital with 12 complaint worsening, diffuse abdominal pain.  This was quite severe.  Clinical exam  noted to have diffuse abdominal tenderness to palpation.  She underwent CT scan that shows evidence of high-grade bowel obstruction with thickened bowel loops noted in the central abdomen with a constriction suggesting an internal hernia.  Decision was made to proceed with surgery for concern of intestinal ischemia.  Risk, benefits and  alternatives were discussed preoperatively and she agrees to the operation.    Description of procedure:  Patient taken the operating room, placed supine on the operating table.  Timeout was established, general anesthesia was induced.  Resuscitative lines were placed by anesthesia.  A Garcia catheter was placed.  The abdomen was prepped and draped in a sterile fashion.  We began by making a midline incision just above the umbilicus.  We placed a 12 mm Moreno trocar.  We established insufflation.  She was noted to have some turbid ascites fluid.  there were markedly distended small bowel loops in the midportion of the abdomen that were quite thickened and showed signs of partial thickness necrosis.  Two 5 mm laparoscopic ports in the left-sided abdominal wall we were able to localize an adhesive band down in the  right lower abdomen.  We were not able to lyse this laparoscopically and elected to proceed via mini laparotomy.  We extended our periumbilical incision and placed an Jerald wound protector.  We eviscerated small bowel loops and what we found was an adhesive band of omentum tethering several loops of small bowel down to the retroperitoneum.  We freed this band.  Unfortunately large volume of small intestine was associated with obvious signs of severe ischemia.    We ran the small bowel and determined sites proximal and distal of clear demarcation between ischemic small bowel and pink and healthy small bowel.  We divided the small bowel proximal to the ischemic segment using a TASHI stapler.In a similar manner we divided small bowel distal  to the ischemic segment using a TASHI stapler.  The involved small bowel mesentery was divided using a LigaSure device.  Approximately 290 cm of ischemic small bowel was resected and this was handed off the field as a specimen.  We then fashioned a small bowel anastomosis between the proximal and distal pink/healthy small bowel segments.  This was accomplished using a TASHI  stapler.  We gathered the common opening and closed this using a TA 60 stapler.  We oversewed the staple line using 3-0 Vicryl in a running fashion.  An  unzippering stitch of 3-0 silk was placed at the crotch of the anastomosis.  We measured the  small bowel from the ligament of Treitz down to the ileocecal valve and she has at least 260 cm remaining.  We irrigated the abdomen with warm normal saline solution.  We then closed our midline fascial defect using 0 PDS sutures placed in interrupted figure-of-eight stitch pattern.  Skin was reapproximated using 3 and 4-0 subcuticular Vicryl stitches followed by Dermabond glue.  Patient tolerated procedure without difficulty and was returned to the intensive care unit in guarded condition      Complications:  None; patient tolerated the procedure well.    Disposition: ICU - intubated and critically ill.  Condition: unstable       Attending Attestation: I was present and scrubbed for the entire procedure.    Den Brush  Phone Number: [phone]

## 2024-05-23 NOTE — ED TRIAGE NOTES
Pt came in for ABD pain and vomiting pt is actively crying and clutching her ABD in pain. Pt has been throwing up for the past few days.

## 2024-05-23 NOTE — H&P
History Of Present Illness  Raegan Ramos is a 88 y.o. female presenting with abdominal pain that began last night around 10 PM. Describes subacute onset of diffuse abdominal pain, abdominal distention. At that time she rated her pain at 10/10 in severity. She ultimately had some nausea and vomiting with this.     She comes emergency department where workup includes a CT scan shows a high-grade small bowel obstruction with suggestion of internal hernia. Nasogastric tube has been placed and she feels slightly better.      Past Medical History  She has a past medical history of Anemia of renal disease (04/24/2023), Chronic diastolic congestive heart failure (Multi) (09/29/2023), Essential (primary) hypertension, Long term (current) use of non-steroidal anti-inflammatories (nsaid), Melena, Other chronic pain, Other forms of dyspnea, Other specified soft tissue disorders, Pain in right knee (11/19/2020), Pain in unspecified knee, Personal history of other diseases of the musculoskeletal system and connective tissue, Personal history of other diseases of the musculoskeletal system and connective tissue (10/11/2022), Personal history of other diseases of the respiratory system, Personal history of other diseases of the respiratory system (02/15/2018), Personal history of other endocrine, nutritional and metabolic disease, Personal history of other endocrine, nutritional and metabolic disease, and Type 2 diabetes mellitus without complications (Multi) (08/15/2018).    Surgical History  She has a past surgical history that includes Other surgical history (10/14/2016); Hysterectomy (11/16/2017); Back surgery (10/11/2022); Total knee arthroplasty (10/11/2022); and CT angio coronary art with heartflow if score >30% (8/7/2023).     Social History  She reports that she has never smoked. She has never used smokeless tobacco. She reports that she does not drink alcohol and does not use drugs.    Family History  No family history  on file.     Allergies  House dust, Hydrocodone-acetaminophen, and Mold    Review of Systems   Constitutional:  Negative for activity change, appetite change, chills and fever.   HENT:  Negative for congestion.    Eyes:  Negative for pain and discharge.   Respiratory:  Negative for cough, chest tightness and shortness of breath.    Cardiovascular:  Negative for chest pain, palpitations and leg swelling.   Gastrointestinal:  Positive for abdominal pain, nausea and vomiting.   Endocrine: Negative for cold intolerance and heat intolerance.   Genitourinary:  Negative for difficulty urinating and dysuria.   Musculoskeletal:  Negative for back pain, myalgias and neck pain.   Skin:  Negative for color change and rash.   Neurological:  Negative for dizziness, seizures, speech difficulty, numbness and headaches.   Psychiatric/Behavioral:  Negative for agitation, behavioral problems and confusion.         Physical Exam   Constitutional: A&Ox3, calm and cooperative  Eyes: clear sclera   ENMT: Moist mucous membranes  Head/Neck: Neck supple  Cardiovascular: Normal rate and regular rhythm.  Respiratory/Thorax:Good symmetric chest expansion.   Gastrointestinal: Abdomen distended, compressible, tenderness throughout abdomen with palpation, NGT placed at the bedside- brown bilous output   Genitourinary: Voiding independently, purewick in place   Musculoskeletal: ROM intact  Extremities: No peripheral edema,  Neurological: A&Ox3  Psychological: Appropriate mood and behavior  Last Recorded Vitals  /77   Pulse 71   Temp 34.7 °C (94.5 °F) (Rectal)   Resp (!) 21   Wt 75.3 kg (166 lb)   SpO2 99%     Relevant Results      Results for orders placed or performed during the hospital encounter of 05/23/24 (from the past 24 hour(s))   CBC and Auto Differential   Result Value Ref Range    WBC 11.1 4.4 - 11.3 x10*3/uL    nRBC 0.0 0.0 - 0.0 /100 WBCs    RBC 4.25 4.00 - 5.20 x10*6/uL    Hemoglobin 13.0 12.0 - 16.0 g/dL    Hematocrit 40.3  36.0 - 46.0 %    MCV 95 80 - 100 fL    MCH 30.6 26.0 - 34.0 pg    MCHC 32.3 32.0 - 36.0 g/dL    RDW 13.6 11.5 - 14.5 %    Platelets 217 150 - 450 x10*3/uL    Neutrophils % 72.2 40.0 - 80.0 %    Immature Granulocytes %, Automated 0.4 0.0 - 0.9 %    Lymphocytes % 19.1 13.0 - 44.0 %    Monocytes % 7.0 2.0 - 10.0 %    Eosinophils % 1.1 0.0 - 6.0 %    Basophils % 0.2 0.0 - 2.0 %    Neutrophils Absolute 8.01 (H) 1.60 - 5.50 x10*3/uL    Immature Granulocytes Absolute, Automated 0.04 0.00 - 0.50 x10*3/uL    Lymphocytes Absolute 2.12 0.80 - 3.00 x10*3/uL    Monocytes Absolute 0.78 0.05 - 0.80 x10*3/uL    Eosinophils Absolute 0.12 0.00 - 0.40 x10*3/uL    Basophils Absolute 0.02 0.00 - 0.10 x10*3/uL   Comprehensive metabolic panel   Result Value Ref Range    Glucose 214 (H) 74 - 99 mg/dL    Sodium 138 136 - 145 mmol/L    Potassium 4.1 3.5 - 5.3 mmol/L    Chloride 103 98 - 107 mmol/L    Bicarbonate 22 21 - 32 mmol/L    Anion Gap 17 10 - 20 mmol/L    Urea Nitrogen 25 (H) 6 - 23 mg/dL    Creatinine 1.06 (H) 0.50 - 1.05 mg/dL    eGFR 51 (L) >60 mL/min/1.73m*2    Calcium 10.4 (H) 8.6 - 10.3 mg/dL    Albumin 4.2 3.4 - 5.0 g/dL    Alkaline Phosphatase 52 33 - 136 U/L    Total Protein 7.5 6.4 - 8.2 g/dL    AST 17 9 - 39 U/L    Bilirubin, Total 0.5 0.0 - 1.2 mg/dL    ALT 7 7 - 45 U/L   Magnesium   Result Value Ref Range    Magnesium 2.20 1.60 - 2.40 mg/dL   Lipase   Result Value Ref Range    Lipase 9 9 - 82 U/L   Troponin I, High Sensitivity   Result Value Ref Range    Troponin I, High Sensitivity 13 0 - 13 ng/L   Lactate   Result Value Ref Range    Lactate 0.7 0.4 - 2.0 mmol/L   Blood Culture    Specimen: Peripheral Venipuncture; Blood culture   Result Value Ref Range    Blood Culture Loaded on Instrument - Culture in progress    Blood Culture    Specimen: Peripheral Venipuncture; Blood culture   Result Value Ref Range    Blood Culture Loaded on Instrument - Culture in progress    POCT GLUCOSE   Result Value Ref Range    POCT Glucose  105 (H) 74 - 99 mg/dL   Urinalysis with Reflex Culture and Microscopic   Result Value Ref Range    Color, Urine Colorless (N) Light-Yellow, Yellow, Dark-Yellow    Appearance, Urine Clear Clear    Specific Gravity, Urine >1.050 (N) 1.005 - 1.035    pH, Urine 8.0 5.0, 5.5, 6.0, 6.5, 7.0, 7.5, 8.0    Protein, Urine 30 (1+) (A) NEGATIVE, 10 (TRACE), 20 (TRACE) mg/dL    Glucose, Urine OVER (4+) (A) Normal mg/dL    Blood, Urine NEGATIVE NEGATIVE    Ketones, Urine 10 (1+) (A) NEGATIVE mg/dL    Bilirubin, Urine NEGATIVE NEGATIVE    Urobilinogen, Urine Normal Normal mg/dL    Nitrite, Urine NEGATIVE NEGATIVE    Leukocyte Esterase, Urine NEGATIVE NEGATIVE   Urinalysis Microscopic   Result Value Ref Range    WBC, Urine NONE 1-5, NONE /HPF    RBC, Urine NONE NONE, 1-2, 3-5 /HPF    Squamous Epithelial Cells, Urine 26-50 (1+) Reference range not established. /HPF   POCT GLUCOSE   Result Value Ref Range    POCT Glucose 406 (H) 74 - 99 mg/dL   POCT GLUCOSE   Result Value Ref Range    POCT Glucose 443 (H) 74 - 99 mg/dL   Basic metabolic panel   Result Value Ref Range    Glucose 433 (H) 74 - 99 mg/dL    Sodium 133 (L) 136 - 145 mmol/L    Potassium 5.7 (H) 3.5 - 5.3 mmol/L    Chloride 103 98 - 107 mmol/L    Bicarbonate 20 (L) 21 - 32 mmol/L    Anion Gap 16 10 - 20 mmol/L    Urea Nitrogen 30 (H) 6 - 23 mg/dL    Creatinine 1.56 (H) 0.50 - 1.05 mg/dL    eGFR 32 (L) >60 mL/min/1.73m*2    Calcium 8.5 (L) 8.6 - 10.3 mg/dL     XR chest 1 view    Result Date: 5/23/2024  Interpreted By:  Camden Romeo, STUDY: XR CHEST 1 VIEW;  5/23/2024 11:30 am   INDICATION: Signs/Symptoms:ng placement.   COMPARISON: Chest radiograph 05/23/2024   ACCESSION NUMBER(S): [id number]   ORDERING CLINICIAN: PEDRO NOLAN   FINDINGS: AP radiograph of the chest was provided.   DEVICES: Interval readjustment previously noted feeding tube with the side port now below the diaphragm and the tip in the proximal gastric body.   CARDIOMEDIASTINAL SILHOUETTE:  Cardiomediastinal silhouette is normal in size and configuration.No significant atherosclerotic calcification.   LUNGS: No focal consolidation. No pneumothorax. No pleural effusion.   BONES: Postoperative changes thoracolumbar fusion. Partially imaged stimulation device leads overlying the midline.       1.  Interval readjustment feeding tube, now with normal position.     Signed by: Camden Romeo 5/23/2024 11:35 AM Dictation workstation:   [id number]    XR chest 1 view    Result Date: 5/23/2024  Interpreted By:  Kelvin Wolf, STUDY: XR CHEST 1 VIEW;  5/23/2024 7:13 am   INDICATION: Signs/Symptoms:NGT placement.   COMPARISON: 05/23/2024 chest x-ray and CT chest   ACCESSION NUMBER(S): [id number]   ORDERING CLINICIAN: EJ COFFEY   FINDINGS: G-tube tip at the level of the left hemidiaphragm. Proximal port is in the esophagus. Recommend repositioning.       CARDIOMEDIASTINAL SILHOUETTE: Cardiomegaly stable. Tortuous aorta. Aortic size measurement is not reliable on this examination.   LUNGS: Lungs are clear.   ABDOMEN: No remarkable upper abdominal findings.   BONES: No acute osseous changes.       1.  NG tube tip at the level of the left hemidiaphragm with proximal port in the mid esophagus. Recommend advancement. Heart lungs are otherwise similar.     MACRO: None   Signed by: Kelvin Wolf 5/23/2024 8:13 AM Dictation workstation:   [id number]    XR chest abdomen for OG NG placement    Result Date: 5/23/2024  Interpreted By:  Rajesh Godoy, STUDY: XR CHEST ABDOMEN FOR OG NG PLACEMENT; ;  5/23/2024 6:31 am   INDICATION: Signs/Symptoms:NGT placement attempt #2.   COMPARISON: Chest radiography of 05/20/2024. Correlation also made to CT chest, abdomen and pelvis of 05/20/2024.   ACCESSION NUMBER(S): [id number]   ORDERING CLINICIAN: EJ COFFEY   FINDINGS: 2 AP views of the chest and abdomen.   Enteric tube approximates the tortuous course of the esophagus with tip in the region of the  diaphragmatic hiatus in a sliding hiatal hernia sac, based on correlation with the prior CT. The tube is kinked at the site of its side-port in a focal region of left lateral excursion and kinking of the tortuous esophagus in the inferior mediastinum. Advancement is needed.   Stable cardiomegaly and aortic tortuosity and atherosclerotic calcification.   Minor patchy airspace opacities in the lung bases probably relate to atelectasis. Lungs otherwise appear clear. No pleural effusion or pneumothorax.       Please see above.     MACRO: None   Signed by: Rajesh Godoy 5/23/2024 6:54 AM Dictation workstation:   [id number]    XR chest abdomen for OG NG placement    Result Date: 5/23/2024  Interpreted By:  Rajesh Godoy, STUDY: XR CHEST ABDOMEN FOR OG NG PLACEMENT; ;  5/23/2024 6:12 am   INDICATION: Signs/Symptoms:ng.   COMPARISON: 08/05/2023.   ACCESSION NUMBER(S): [id number]   ORDERING CLINICIAN: EJ COFFEY   FINDINGS: AP semi upright view of the chest.   Enteric tube approximates the proximal esophagus and is looped upon itself in the region of the distal esophagus with its distal end oriented cephalad with tip just proximal to the thoracic inlet, and side-port in the mid-esophagus. Replacement is needed.   Stably enlarged cardiac silhouette and aortic tortuosity with athero sclerotic calcification of the arch.   Patchy bibasilar airspace opacities may relate to atelectasis or pneumonia. No pleural effusion or pneumothorax.       Please see above.     MACRO: None   Signed by: Rajesh Godoy 5/23/2024 6:19 AM Dictation workstation:   [id number]    CT angio chest abdomen pelvis    Result Date: 5/23/2024  Interpreted By:  Rajesh Godoy, STUDY: CT ANGIO CHEST ABDOMEN PELVIS; ;  5/23/2024 3:53 am   INDICATION: Signs/Symptoms:sob. Abdominal pain and vomiting.   COMPARISON: CT abdomen and pelvis of 07/05/2023.   ACCESSION NUMBER(S): [id number]   ORDERING CLINICIAN: EJ COFFEY   TECHNIQUE: Noncontrast CT of the  chest, abdomen and pelvis was followed by CT angiography of the chest, abdomen and pelvis after IV administration of 100 cc Omnipaque 350. Multiplanar, MIP and 3D reformations.   FINDINGS: CHEST:   VESSELS: No evidence of aortic intramural hematoma or dissection. Thoracic aorta is tortuous with moderate partially calcified atherosclerotic plaque in thoracic aorta and arch vessels without significant luminal narrowing. Evaluation of the origins of the celiac axis and SMA is limited by artifact from spinal hardware. There is atherosclerotic plaque at their origins without definite hemodynamically significant narrowing. Moderate to severe focal narrowing of the proximal renal arteries. Mild narrowing of internal iliac, inferior and superior gluteal arteries. No aneurysmal dilation. No other definite hemodynamically significant narrowing of the imaged systemic arteries. Main pulmonary artery is dilated compatible with pulmonary arterial hypertension. No acute pulmonary embolus. Coronary artery calcifications are present; please note that study is not optimized for evaluation of coronary arteries. HEART: Enlarged. Mitral annular calcification. No pericardial effusion. MEDIASTINUM AND JERRICA: No pathologically enlarged thoracic lymph nodes. Circumferential mural thickening of the esophagus with submucosal edema compatible with esophagitis; underlying neoplasm is not excluded. Recommend endoscopic correlation or further evaluation nonemergent outpatient fluoroscopic barium swallow exam. A yellow alert was sent. LUNG, PLEURA, LARGE AIRWAYS: Within normal limits. CHEST WALL AND LOWER NECK: Within normal limits.   ABDOMEN:   BONES: No acute osseous abnormality. Thoracolumbar laminectomy infusion. ABDOMINAL WALL: Within normal limits.   LIVER: Irregular hepatic surface suspicious for cirrhosis. Redemonstrated small cyst in the right lobe. BILE DUCTS: Normal caliber. GALLBLADDER: No calcified gallstones. No wall thickening.  PANCREAS: Pancreatic atrophy, with similar prominence of the pancreatic duct. Otherwise, normal. SPLEEN: Within normal limits. ADRENALS: Nodular thickening of the adrenal glands most likely relates to adenomatous change. KIDNEYS: Vascular calcifications in the left kidney. Otherwise, normal. URETERS: No hydroureter.   PELVIS:   REPRODUCTIVE ORGANS: No pelvic mass or significant free pelvic fluid. BLADDER: Within normal limits.   RETROPERITONEUM: Within normal limits. BOWEL: Stomach is decompressed and partially obscured by artifact and limited for evaluation. Small bowel loops in the left upper quadrant appear normal. The terminal ileum appears normal. There are dilated loops of small bowel in the right upper quadrant and central abdomen which appears somewhat radially oriented upheld a point of central tethering, with tapering of its proximal and distal aspects and mesenteric swirling best seen on coronal imaging (series 603, images 12-81), with mesenteric edema, concerning for internal hernia with closed loop obstruction. There appears to be arterial hypoenhancement of the dilated small bowel loops, with more normal mural enhancement on more delayed imaging suggesting possible early ischemia. No definite pneumatosis intestinalis. Surgical evaluation is recommended.  Appendix is not identified with certainty. PERITONEUM: Moderate volume of ascites. No pneumoperitoneum or loculated intraperitoneal collection.       Small bowel loops in the left upper quadrant appear normal. The terminal ileum appears normal. There are dilated loops of small bowel in the right upper quadrant and central abdomen which appears somewhat radially oriented upheld a point of central tethering, with tapering of its proximal and distal aspects and mesenteric swirling best seen on coronal imaging (series 603, images 12-81), with mesenteric edema, concerning for internal hernia with closed loop obstruction. There appears to be arterial  hypoenhancement of the dilated small bowel loops, with more normal mural enhancement on more delayed imaging suggesting possible early ischemia. No definite pneumatosis intestinalis. Surgical evaluation is recommended.   No evidence of acute abnormality of the imaged systemic arterial vasculature. No acute pulmonary embolus.   Moderate volume of ascites.   No definite evidence of acute pathology in the chest. Dilated main pulmonary artery compatible with pulmonary arterial hypertension. Cardiomegaly.   Circumferential mural thickening of the esophagus with submucosal edema compatible with esophagitis; underlying neoplasm is not excluded. Recommend endoscopic correlation or further evaluation nonemergent outpatient fluoroscopic barium swallow exam. A yellow alert was sent.     MACRO: Critical Finding:  See findings. Notification was initiated on 5/23/2024 at 4:26 am by  Rajesh Godoy.  (**-YCF-**) Instructions:   Signed by: Rajesh Godoy 5/23/2024 4:27 AM Dictation workstation:   [id number]        Assessment/Plan   Principal Problem:    SBO (small bowel obstruction) (Multi)    # SBO  - NGT to LIWS  - IVF  - NPO   - PRN pain medications  - PRN antiemetics  -continue Zosyn  -general surgery consult  -plan for OR today  -monitor    # A fibb  -continue home meds  -hold eliquis    # HFpEF  -euvolemic  -continue home meds             Marcy Mims MD

## 2024-05-23 NOTE — PROGRESS NOTES
05/23/24 0804   [facility] Disability Status   Are you deaf or do you have serious difficulty hearing? N   Are you blind or do you have serious difficulty seeing, even when wearing glasses? N   Because of a physical, mental, or emotional condition, do you have serious difficulty concentrating, remembering, or making decisions? (5 years old or older) N   Do you have serious difficulty walking or climbing stairs? N   Do you have serious difficulty dressing or bathing? N   Because of a physical, mental, or emotional condition, do you have serious difficulty doing errands alone such as visiting the doctor? N

## 2024-05-23 NOTE — ED NOTES
Pt came to ED for c/o severe abdominal pain with N/V since 10pm last night. PT arrived in severe pain with the inability to get comfortably. IV was immediately inserted, labs drawn, and EKG completed. MD was called to bedside to assess patient. Pt was given morphine and later dilaudid with some relief. CT of abdomen was completed. At 0445 pt was seen to be sweating with the complaint of being very hot. Oral temperature was attempted and was unsuccessful due to not being able to retrieve a reading. Temporal was not successful and showing an inaccurate reading. Rectal temperature was completed and showed 92 F. MD was immediately notified. Sepsis protocol was initiated at this time. Michelle Hugger was applied. Blood cultures collected and sent. Zosyn and vanco given. 1500 LR administered including 1L NS earlier. CT showed a SBO. NG was placed 14F in Right nare. Resistance half way down was noted but stomach contents were empting through NG tube. Xray showed the NG curled in mid epigastric area. NG was reinserted a second time and showed a similar result. 3rd NG attempt by 2nd nurse in Left nare was inserted with same noted resistance senior living down. Xray ordered for verification and currently awaiting read. NG clamped at this time. Temperature retaken rectally showing 92.2. Pt remains NPO and awaiting surgeon evaluation.      Debi Ch RN  05/23/24 [id number]

## 2024-05-23 NOTE — CONSULTS
HPI  Patient is a 88-year-old female who comes in with abdominal pain that began last night around 10 PM.  Describes subacute onset of diffuse abdominal pain, abdominal distention.  At that time she rated her pain at 10/10 in severity.  She ultimately had some nausea and vomiting with this.  She comes emergency department where workup includes a CT scan shows a high-grade small bowel obstruction with suggestion of internal hernia.  Nasogastric tube has been placed and she feels slightly better.    PMH: Anemia, CKD, CHF, HTN, osteoarthritis    PSH: Hysterectomy, back surgery, total knee replacement        Well-nourished, well-developed. In no distress  Alert and oriented x 3  Lungs are clear to auscultation bilaterally.  Cardiac exam is regular rhythm and rate.  Abdomen is soft nontender nondistended.  Neurologic exam is without focal deficit.      === 05/23/24 =     CT   IMPRESSION:  Small bowel loops in the left upper quadrant appear normal. The  terminal ileum appears normal. There are dilated loops of small bowel  in the right upper quadrant and central abdomen which appears  somewhat radially oriented upheld a point of central tethering, with  tapering of its proximal and distal aspects and mesenteric swirling  best seen on coronal imaging (series 603, images 12-81), with  mesenteric edema, concerning for internal hernia with closed loop  obstruction. There appears to be arterial hypoenhancement of the  dilated small bowel loops, with more normal mural enhancement on more  delayed imaging suggesting possible early ischemia. No definite  pneumatosis intestinalis. Surgical evaluation is recommended.      No evidence of acute abnormality of the imaged systemic arterial  vasculature. No acute pulmonary embolus.      Moderate volume of ascites.      No definite evidence of acute pathology in the chest. Dilated main  pulmonary artery compatible with pulmonary arterial hypertension.  Cardiomegaly.       Circumferential mural thickening of the esophagus with submucosal  edema compatible with esophagitis; underlying neoplasm is not  excluded. Recommend endoscopic correlation or further evaluation  nonemergent outpatient fluoroscopic barium swallow exam. A yellow  alert was sent.      Lab Results   Component Value Date    GLUCOSE 214 (H) 05/23/2024     05/23/2024    K 4.1 05/23/2024     05/23/2024    CO2 22 05/23/2024    ANIONGAP 17 05/23/2024    BUN 25 (H) 05/23/2024    CREATININE 1.06 (H) 05/23/2024    EGFR 51 (L) 05/23/2024    CALCIUM 10.4 (H) 05/23/2024    ALBUMIN 4.2 05/23/2024    ALKPHOS 52 05/23/2024    PROT 7.5 05/23/2024    AST 17 05/23/2024    BILITOT 0.5 05/23/2024    ALT 7 05/23/2024     Lab Results   Component Value Date    WBC 11.1 05/23/2024    HGB 13.0 05/23/2024    HCT 40.3 05/23/2024    MCV 95 05/23/2024     05/23/2024        Impression: Patient with abdominal pain, signs of small bowel obstruction.  CT scan shows dilated loops of small bowel central abdomen with point of tethering suggesting an internal hernia.  Persistent pain and tenderness suggest possible intestinal ischemia.    I have recommended diagnostic laparoscopy, possible laparotomy.  She has been placed on the add-on schedule for today

## 2024-05-23 NOTE — ANESTHESIA PROCEDURE NOTES
Airway  Date/Time: 5/23/2024 4:49 PM  Urgency: elective    Airway not difficult    Staffing  Performed: MARIA A   Authorized by: Buddy Chavis MD    Performed by: MARIA A Rodrigez  Patient location during procedure: OR    Indications and Patient Condition  Indications for airway management: anesthesia  Spontaneous Ventilation: absent  Sedation level: deep  Preoxygenated: yes  Patient position: sniffing  MILS maintained throughout  Mask difficulty assessment: 1 - vent by mask    Final Airway Details  Final airway type: endotracheal airway      Successful airway: ETT  Cuffed: yes   Successful intubation technique: direct laryngoscopy  Blade: Evita  Blade size: #3  ETT size (mm): 7.0  Cormack-Lehane Classification: grade I - full view of glottis  Placement verified by: chest auscultation and capnometry   Measured from: lips  ETT to lips (cm): 22  Number of attempts at approach: 1    Additional Comments  RSI with cricoid

## 2024-05-23 NOTE — ED PROVIDER NOTES
HPI   Chief Complaint   Patient presents with    Abdominal Pain    Vomiting       HPI  Patient presents with severe abdominal pain and vomiting today.  She states for last week she has had lots of undigested food coming up.  The patient is with her son.  He states that she has had a small bowel pressure in the past with an ileus requiring an enema.  He states this resolved her symptoms.  Patient has not had a bowel movement since Monday.  Patient has had no proceeding diarrhea constipation or blood in the stool.  No blood in the urine or pain with urination.  Denies any fever or coughing.                  Royal Coma Scale Score: 15                     Patient History   Past Medical History:   Diagnosis Date    Anemia of renal disease 04/24/2023    Chronic diastolic congestive heart failure (Multi) 09/29/2023    Essential (primary) hypertension     Essential hypertension, benign    Long term (current) use of non-steroidal anti-inflammatories (nsaid)     NSAID long-term use    Melena     Melena    Other chronic pain     Chronic pain    Other forms of dyspnea     Dyspnea on exertion    Other specified soft tissue disorders     Soft tissue mass    Pain in right knee 11/19/2020    Chronic pain of right knee    Pain in unspecified knee     Pain in joint, lower leg    Personal history of other diseases of the musculoskeletal system and connective tissue     History of low back pain    Personal history of other diseases of the musculoskeletal system and connective tissue 10/11/2022    History of spinal stenosis    Personal history of other diseases of the respiratory system     History of allergic rhinitis    Personal history of other diseases of the respiratory system 02/15/2018    History of acute bronchitis    Personal history of other endocrine, nutritional and metabolic disease     History of type 2 diabetes mellitus    Personal history of other endocrine, nutritional and metabolic disease     History of hypokalemia     Type 2 diabetes mellitus without complications (Multi) 08/15/2018    Diabetes mellitus type 2, controlled     Past Surgical History:   Procedure Laterality Date    BACK SURGERY  10/11/2022    Back Surgery    CT ANGIO CORONARY ART WITH HEARTFLOW IF SCORE >30%  8/7/2023    CT HEART CORONARY ANGIOGRAM 8/7/2023 [facility] CT    HYSTERECTOMY  11/16/2017    Hysterectomy    OTHER SURGICAL HISTORY  10/14/2016    Dental Surgery    TOTAL KNEE ARTHROPLASTY  10/11/2022    Knee Replacement     No family history on file.  Social History     Tobacco Use    Smoking status: Never    Smokeless tobacco: Never   Vaping Use    Vaping status: Never Used   Substance Use Topics    Alcohol use: Never    Drug use: Never       Physical Exam   ED Triage Vitals [05/23/24 0123]   Temperature Heart Rate Respirations BP   35.1 °C (95.2 °F) 62 16 (!) 121/103      Pulse Ox Temp Source Heart Rate Source Patient Position   98 % Oral Monitor Sitting      BP Location FiO2 (%)     Left arm --       Physical Exam  Vitals and nursing note reviewed.   Constitutional:       General: She is not in acute distress.     Appearance: She is well-developed.   HENT:      Head: Normocephalic and atraumatic.   Eyes:      Conjunctiva/sclera: Conjunctivae normal.   Cardiovascular:      Rate and Rhythm: Normal rate and regular rhythm.      Heart sounds: No murmur heard.  Pulmonary:      Effort: Pulmonary effort is normal. No respiratory distress.      Breath sounds: Normal breath sounds.   Abdominal:      General: There is distension.      Palpations: Abdomen is soft.      Tenderness: There is generalized abdominal tenderness.   Musculoskeletal:         General: No swelling.      Cervical back: Neck supple.   Skin:     General: Skin is warm and dry.      Capillary Refill: Capillary refill takes less than 2 seconds.   Neurological:      Mental Status: She is alert.   Psychiatric:         Mood and Affect: Mood normal.         ED Course & MDM   Diagnoses as of 05/23/24 5040    SBO (small bowel obstruction) (Multi)   Hypothermia, initial encounter   Sepsis, due to unspecified organism, unspecified whether acute organ dysfunction present (Multi)       Medical Decision Making  The patient had no specific guarding or rebound but she is screaming in pain.  Concern for possible surgical abdominal problem.  The patient has somewhat dry appearing mucous membranes.  Patient was found to be extremely hypothermic make me concerned for possible sepsis.  Patient found to have small bowel obstruction.  In concert with the sepsis that the patient may have had ischemic gut or rupture.  I spoke to surgery JADEN about the severity of my concerns.  Will admit to medicine.    Procedure  Procedures     Bharathi Corbett MD  05/23/24 1087

## 2024-05-23 NOTE — PROGRESS NOTES
Pharmacy Medication History Review    Raegan Ramos is a 88 y.o. female admitted for SBO (small bowel obstruction) (Multi). Pharmacy reviewed the patient's dufjn-ag-fsqzwzejf medications and allergies for accuracy.  Patient stated that she takes vitamin b-6 once a day at home. She also states she takes protonix still.  The list below reflectives the updated PTA list. Please review each medication in order reconciliation for additional clarification and justification.  Prior to Admission Medications   Prescriptions Last Dose Informant   DULoxetine (Cymbalta) 30 mg DR capsule 5/22/2024    Sig: Take 3 capsules (90 mg) by mouth once daily.   Entresto  mg tablet 5/22/2024    Sig: Take 1 tablet by mouth 2 times a day.   OneTouch Ultra Test strip     Sig: USE TO TEST TWICE DAILY AS DIRECTED   acetaminophen (Tylenol 8 HOUR) 650 mg ER tablet 5/22/2024    Sig: Take 2 tablets (1,300 mg) by mouth every 8 hours if needed for moderate pain (4 - 6).   albuterol 90 mcg/actuation inhaler Unknown    Sig: INHALE 2 INHALATIONS BY MOUTH  EVERY 6 HOURS IF NEEDED FOR  WHEEZING   anastrozole (Arimidex) 1 mg tablet 5/22/2024    Sig: Take 1 tablet (1 mg total) by mouth once daily.  Swallow whole with a drink of water.   apixaban (Eliquis) 5 mg tablet 5/22/2024 at 9 am    Sig: TAKE 1 TABLET BY MOUTH TWICE  DAILY   calcium carbonate-vit D3-min 600 mg calcium- 400 unit tablet 5/22/2024    Sig: Take 1 tablet by mouth in the morning and 1 tablet before bedtime.   carvedilol (Coreg) 25 mg tablet 5/22/2024    Sig: Take 1 tablet (25 mg) by mouth 2 times a day.   empagliflozin (Jardiance) 10 mg 5/22/2024    Sig: Take 1 tablet (10 mg) by mouth once daily.   hydrALAZINE (Apresoline) 10 mg tablet 5/22/2024    Sig: Take 1 tablet (10 mg) by mouth 3 times a day.   isosorbide mononitrate ER (Imdur) 30 mg 24 hr tablet 5/22/2024    Sig: Take 1 tablet (30 mg) by mouth once daily.   metFORMIN (Glucophage) 500 mg tablet 5/22/2024    Sig: TAKE 1 TABLET BY  MOUTH TWICE  DAILY   pantoprazole (ProtoNix) 40 mg EC tablet Unknown    Sig: TAKE 1 TABLET BY MOUTH ONCE  DAILY   pravastatin (Pravachol) 20 mg tablet 5/22/2024    Sig: Take 1 tablet (20 mg) by mouth once daily at bedtime.   pyridoxine (Vitamin B-6) 50 mg tablet     Sig: Take 1 tablet (50 mg) by mouth once daily.   spironolactone (Aldactone) 25 mg tablet     Sig: TAKE 1 TABLET BY MOUTH TWICE  DAILY      Facility-Administered Medications: None         The list below reflectives the updated allergy list. Please review each documented allergy for additional clarification and justification.  Allergies  Reviewed by Arelis Beck, DILIA on 5/23/2024        Severity Reactions Comments    House Dust Not Specified Other, Cough     Hydrocodone-acetaminophen Not Specified Nausea Only, Nausea/vomiting     Mold Not Specified Unknown             Below are additional concerns with the patient's PTA list.      Melissa Vicente

## 2024-05-23 NOTE — PROGRESS NOTES
Home with son     05/23/24 0803   Current Planned Discharge Disposition   Current Planned Discharge Disposition Home

## 2024-05-23 NOTE — PROGRESS NOTES
05/23/24 0818   [facility] Disability Status   Are you deaf or do you have serious difficulty hearing? N   Are you blind or do you have serious difficulty seeing, even when wearing glasses? N   Because of a physical, mental, or emotional condition, do you have serious difficulty concentrating, remembering, or making decisions? (5 years old or older) N   Do you have serious difficulty walking or climbing stairs? Y  (walks with cane)   Do you have serious difficulty dressing or bathing? N   Because of a physical, mental, or emotional condition, do you have serious difficulty doing errands alone such as visiting the doctor? Y  (adult children drive to appointments)

## 2024-05-23 NOTE — PROGRESS NOTES
Transitional Care Coordination Progress Note:  Plan per Medical/Surgical team: treatment of SBO, hypothermia with IV ATB, Dilaudid, morphine, zofran, IV fluids, NPO, NGT to LIS, surgery following serial KUB's (?early ischemia)  Status: Inpatient  Payor source: [facility]  Discharge disposition: Home with yusef Starr, ?new HHC vs SNF   Potential Barriers: temp 33.4  will need PT/OT when medically stable  ADOD: 5/27/2024  LEYDA Chaudhary RN, BSN Transitional Care Coordinator ED# [phone]      05/23/24 0804   Discharge Planning   Living Arrangements Children   Support Systems Children   Assistance Needed surgery following serial KUB's   Type of Residence Private residence   Number of Stairs to Enter Residence 6   Number of Stairs Within Residence 12   Home or Post Acute Services None   Patient expects to be discharged to: Home with yusef Starr ? new HHC vs SNF   Does the patient need discharge transport arranged? Yes   RoundTrip coordination needed? Yes   Has discharge transport been arranged? No   Financial Resource Strain   How hard is it for you to pay for the very basics like food, housing, medical care, and heating? Not hard   Housing Stability   In the last 12 months, was there a time when you were not able to pay the mortgage or rent on time? N   In the last 12 months, how many places have you lived? 1   In the last 12 months, was there a time when you did not have a steady place to sleep or slept in a shelter (including now)? N   Transportation Needs   In the past 12 months, has lack of transportation kept you from medical appointments or from getting medications? no   In the past 12 months, has lack of transportation kept you from meetings, work, or from getting things needed for daily living? No

## 2024-05-24 ENCOUNTER — DOCUMENTATION (OUTPATIENT)
Dept: RESEARCH | Age: 89
End: 2024-05-24
Payer: MEDICARE

## 2024-05-24 LAB
ALBUMIN SERPL BCP-MCNC: 2.8 G/DL (ref 3.4–5)
ALP SERPL-CCNC: 45 U/L (ref 33–136)
ALT SERPL W P-5'-P-CCNC: 2941 U/L (ref 7–45)
ANION GAP BLDA CALCULATED.4IONS-SCNC: 14 MMO/L (ref 10–25)
ANION GAP BLDA CALCULATED.4IONS-SCNC: 17 MMO/L (ref 10–25)
ANION GAP SERPL CALC-SCNC: 17 MMOL/L (ref 10–20)
AST SERPL W P-5'-P-CCNC: 2683 U/L (ref 9–39)
BASE EXCESS BLDA CALC-SCNC: -10.1 MMOL/L (ref -2–3)
BASE EXCESS BLDA CALC-SCNC: -11.4 MMOL/L (ref -2–3)
BILIRUB SERPL-MCNC: 0.5 MG/DL (ref 0–1.2)
BODY TEMPERATURE: 37 DEGREES CELSIUS
BODY TEMPERATURE: 37 DEGREES CELSIUS
BUN SERPL-MCNC: 43 MG/DL (ref 6–23)
CA-I BLDA-SCNC: 1.09 MMOL/L (ref 1.1–1.33)
CA-I BLDA-SCNC: 1.09 MMOL/L (ref 1.1–1.33)
CALCIUM SERPL-MCNC: 7.6 MG/DL (ref 8.6–10.3)
CHLORIDE BLDA-SCNC: 111 MMOL/L (ref 98–107)
CHLORIDE BLDA-SCNC: 113 MMOL/L (ref 98–107)
CHLORIDE SERPL-SCNC: 112 MMOL/L (ref 98–107)
CO2 SERPL-SCNC: 16 MMOL/L (ref 21–32)
CREAT SERPL-MCNC: 2.5 MG/DL (ref 0.5–1.05)
EGFRCR SERPLBLD CKD-EPI 2021: 18 ML/MIN/1.73M*2
ERYTHROCYTE [DISTWIDTH] IN BLOOD BY AUTOMATED COUNT: 14.5 % (ref 11.5–14.5)
GLUCOSE BLD MANUAL STRIP-MCNC: 125 MG/DL (ref 74–99)
GLUCOSE BLD MANUAL STRIP-MCNC: 136 MG/DL (ref 74–99)
GLUCOSE BLD MANUAL STRIP-MCNC: 138 MG/DL (ref 74–99)
GLUCOSE BLD MANUAL STRIP-MCNC: 140 MG/DL (ref 74–99)
GLUCOSE BLD MANUAL STRIP-MCNC: 146 MG/DL (ref 74–99)
GLUCOSE BLD MANUAL STRIP-MCNC: 148 MG/DL (ref 74–99)
GLUCOSE BLDA-MCNC: 150 MG/DL (ref 74–99)
GLUCOSE BLDA-MCNC: 170 MG/DL (ref 74–99)
GLUCOSE SERPL-MCNC: 153 MG/DL (ref 74–99)
HCO3 BLDA-SCNC: 14.1 MMOL/L (ref 22–26)
HCO3 BLDA-SCNC: 15.1 MMOL/L (ref 22–26)
HCT VFR BLD AUTO: 30.9 % (ref 36–46)
HCT VFR BLD EST: 28 % (ref 36–46)
HCT VFR BLD EST: 32 % (ref 36–46)
HGB BLD-MCNC: 9.6 G/DL (ref 12–16)
HGB BLDA-MCNC: 10.6 G/DL (ref 12–16)
HGB BLDA-MCNC: 9.3 G/DL (ref 12–16)
INHALED O2 CONCENTRATION: 40 %
INHALED O2 CONCENTRATION: 40 %
LACTATE BLDA-SCNC: 1.8 MMOL/L (ref 0.4–2)
LACTATE BLDA-SCNC: 3.2 MMOL/L (ref 0.4–2)
MCH RBC QN AUTO: 31.1 PG (ref 26–34)
MCHC RBC AUTO-ENTMCNC: 31.1 G/DL (ref 32–36)
MCV RBC AUTO: 100 FL (ref 80–100)
NRBC BLD-RTO: 0 /100 WBCS (ref 0–0)
OXYHGB MFR BLDA: 97 % (ref 94–98)
OXYHGB MFR BLDA: 98 % (ref 94–98)
PCO2 BLDA: 30 MM HG (ref 38–42)
PCO2 BLDA: 30 MM HG (ref 38–42)
PH BLDA: 7.28 PH (ref 7.38–7.42)
PH BLDA: 7.31 PH (ref 7.38–7.42)
PHOSPHATE SERPL-MCNC: 5.9 MG/DL (ref 2.5–4.9)
PLATELET # BLD AUTO: 160 X10*3/UL (ref 150–450)
PO2 BLDA: 152 MM HG (ref 85–95)
PO2 BLDA: 164 MM HG (ref 85–95)
POTASSIUM BLDA-SCNC: 5 MMOL/L (ref 3.5–5.3)
POTASSIUM BLDA-SCNC: 6.6 MMOL/L (ref 3.5–5.3)
POTASSIUM SERPL-SCNC: 5.3 MMOL/L (ref 3.5–5.3)
PROT SERPL-MCNC: 4.7 G/DL (ref 6.4–8.2)
RBC # BLD AUTO: 3.09 X10*6/UL (ref 4–5.2)
SAO2 % BLDA: 100 % (ref 94–100)
SAO2 % BLDA: 100 % (ref 94–100)
SODIUM BLDA-SCNC: 135 MMOL/L (ref 136–145)
SODIUM BLDA-SCNC: 137 MMOL/L (ref 136–145)
SODIUM SERPL-SCNC: 140 MMOL/L (ref 136–145)
SPECIMEN DRAWN FROM PATIENT: ABNORMAL
WBC # BLD AUTO: 9.8 X10*3/UL (ref 4.4–11.3)

## 2024-05-24 PROCEDURE — 2500000004 HC RX 250 GENERAL PHARMACY W/ HCPCS (ALT 636 FOR OP/ED): Performed by: STUDENT IN AN ORGANIZED HEALTH CARE EDUCATION/TRAINING PROGRAM

## 2024-05-24 PROCEDURE — 82947 ASSAY GLUCOSE BLOOD QUANT: CPT | Mod: 91

## 2024-05-24 PROCEDURE — 99291 CRITICAL CARE FIRST HOUR: CPT | Performed by: SURGERY

## 2024-05-24 PROCEDURE — C9113 INJ PANTOPRAZOLE SODIUM, VIA: HCPCS | Performed by: NURSE PRACTITIONER

## 2024-05-24 PROCEDURE — 2500000005 HC RX 250 GENERAL PHARMACY W/O HCPCS: Performed by: NURSE PRACTITIONER

## 2024-05-24 PROCEDURE — 2020000001 HC ICU ROOM DAILY

## 2024-05-24 PROCEDURE — 94003 VENT MGMT INPAT SUBQ DAY: CPT

## 2024-05-24 PROCEDURE — 82947 ASSAY GLUCOSE BLOOD QUANT: CPT | Mod: 91 | Performed by: STUDENT IN AN ORGANIZED HEALTH CARE EDUCATION/TRAINING PROGRAM

## 2024-05-24 PROCEDURE — 85018 HEMOGLOBIN: CPT | Mod: 91 | Performed by: NURSE PRACTITIONER

## 2024-05-24 PROCEDURE — 94681 O2 UPTK CO2 OUTP % O2 XTRC: CPT

## 2024-05-24 PROCEDURE — 82810 BLOOD GASES O2 SAT ONLY: CPT | Mod: 91 | Performed by: SURGERY

## 2024-05-24 PROCEDURE — 2500000004 HC RX 250 GENERAL PHARMACY W/ HCPCS (ALT 636 FOR OP/ED): Performed by: SURGERY

## 2024-05-24 PROCEDURE — 82947 ASSAY GLUCOSE BLOOD QUANT: CPT | Mod: 91,MUE

## 2024-05-24 PROCEDURE — 37799 UNLISTED PX VASCULAR SURGERY: CPT | Performed by: STUDENT IN AN ORGANIZED HEALTH CARE EDUCATION/TRAINING PROGRAM

## 2024-05-24 PROCEDURE — 2500000005 HC RX 250 GENERAL PHARMACY W/O HCPCS: Performed by: SURGERY

## 2024-05-24 PROCEDURE — 2500000004 HC RX 250 GENERAL PHARMACY W/ HCPCS (ALT 636 FOR OP/ED): Performed by: NURSE PRACTITIONER

## 2024-05-24 PROCEDURE — 85027 COMPLETE CBC AUTOMATED: CPT | Performed by: STUDENT IN AN ORGANIZED HEALTH CARE EDUCATION/TRAINING PROGRAM

## 2024-05-24 RX ORDER — METFORMIN HYDROCHLORIDE 500 MG/1
500 TABLET ORAL 2 TIMES DAILY
Qty: 180 TABLET | Refills: 0 | Status: SHIPPED | OUTPATIENT
Start: 2024-05-24 | End: 2024-05-27

## 2024-05-24 RX ORDER — NOREPINEPHRINE BITARTRATE/D5W 8 MG/250ML
0-3 PLASTIC BAG, INJECTION (ML) INTRAVENOUS CONTINUOUS
Status: DISCONTINUED | OUTPATIENT
Start: 2024-05-24 | End: 2024-05-25

## 2024-05-24 RX ADMIN — PIPERACILLIN SODIUM AND TAZOBACTAM SODIUM 3.38 G: 3; .375 INJECTION, SOLUTION INTRAVENOUS at 02:50

## 2024-05-24 RX ADMIN — PROPOFOL 10 MCG/KG/MIN: 10 INJECTION, EMULSION INTRAVENOUS at 22:12

## 2024-05-24 RX ADMIN — PIPERACILLIN SODIUM AND TAZOBACTAM SODIUM 2.25 G: 2; .25 INJECTION, SOLUTION INTRAVENOUS at 15:46

## 2024-05-24 RX ADMIN — PIPERACILLIN SODIUM AND TAZOBACTAM SODIUM 3.38 G: 3; .375 INJECTION, SOLUTION INTRAVENOUS at 10:23

## 2024-05-24 RX ADMIN — SODIUM CHLORIDE 100 ML/HR: 9 INJECTION, SOLUTION INTRAVENOUS at 13:06

## 2024-05-24 RX ADMIN — FENTANYL CITRATE 25 MCG: 50 INJECTION, SOLUTION INTRAMUSCULAR; INTRAVENOUS at 01:27

## 2024-05-24 RX ADMIN — SODIUM CHLORIDE, POTASSIUM CHLORIDE, SODIUM LACTATE AND CALCIUM CHLORIDE 500 ML: 600; 310; 30; 20 INJECTION, SOLUTION INTRAVENOUS at 01:40

## 2024-05-24 RX ADMIN — SODIUM CHLORIDE 100 ML/HR: 9 INJECTION, SOLUTION INTRAVENOUS at 02:51

## 2024-05-24 RX ADMIN — NOREPINEPHRINE BITARTRATE 0.1 MCG/KG/MIN: 8 INJECTION, SOLUTION INTRAVENOUS at 17:06

## 2024-05-24 RX ADMIN — Medication 40 PERCENT: at 19:38

## 2024-05-24 RX ADMIN — PROPOFOL 20 MCG/KG/MIN: 10 INJECTION, EMULSION INTRAVENOUS at 09:47

## 2024-05-24 RX ADMIN — Medication 40 PERCENT: at 08:20

## 2024-05-24 RX ADMIN — SODIUM CHLORIDE, POTASSIUM CHLORIDE, SODIUM LACTATE AND CALCIUM CHLORIDE 1000 ML: 600; 310; 30; 20 INJECTION, SOLUTION INTRAVENOUS at 14:21

## 2024-05-24 RX ADMIN — Medication 40 PERCENT: at 03:08

## 2024-05-24 RX ADMIN — PANTOPRAZOLE SODIUM 40 MG: 40 INJECTION, POWDER, FOR SOLUTION INTRAVENOUS at 09:48

## 2024-05-24 RX ADMIN — NOREPINEPHRINE BITARTRATE 0.03 MCG/KG/MIN: 8 INJECTION, SOLUTION INTRAVENOUS at 06:14

## 2024-05-24 RX ADMIN — PROPOFOL 20 MCG/KG/MIN: 10 INJECTION, EMULSION INTRAVENOUS at 02:55

## 2024-05-24 SDOH — SOCIAL STABILITY: SOCIAL INSECURITY: ARE YOU OR HAVE YOU BEEN THREATENED OR ABUSED PHYSICALLY, EMOTIONALLY, OR SEXUALLY BY ANYONE?: UNABLE TO ASSESS

## 2024-05-24 SDOH — SOCIAL STABILITY: SOCIAL INSECURITY: HAS ANYONE EVER THREATENED TO HURT YOUR FAMILY OR YOUR PETS?: UNABLE TO ASSESS

## 2024-05-24 SDOH — SOCIAL STABILITY: SOCIAL INSECURITY: WERE YOU ABLE TO COMPLETE ALL THE BEHAVIORAL HEALTH SCREENINGS?: YES

## 2024-05-24 SDOH — SOCIAL STABILITY: SOCIAL INSECURITY: DO YOU FEEL ANYONE HAS EXPLOITED OR TAKEN ADVANTAGE OF YOU FINANCIALLY OR OF YOUR PERSONAL PROPERTY?: UNABLE TO ASSESS

## 2024-05-24 SDOH — SOCIAL STABILITY: SOCIAL INSECURITY: ARE THERE ANY APPARENT SIGNS OF INJURIES/BEHAVIORS THAT COULD BE RELATED TO ABUSE/NEGLECT?: UNABLE TO ASSESS

## 2024-05-24 SDOH — SOCIAL STABILITY: SOCIAL INSECURITY: DO YOU FEEL UNSAFE GOING BACK TO THE PLACE WHERE YOU ARE LIVING?: UNABLE TO ASSESS

## 2024-05-24 SDOH — SOCIAL STABILITY: SOCIAL INSECURITY: DOES ANYONE TRY TO KEEP YOU FROM HAVING/CONTACTING OTHER FRIENDS OR DOING THINGS OUTSIDE YOUR HOME?: UNABLE TO ASSESS

## 2024-05-24 SDOH — SOCIAL STABILITY: SOCIAL INSECURITY: HAVE YOU HAD THOUGHTS OF HARMING ANYONE ELSE?: UNABLE TO ASSESS

## 2024-05-24 SDOH — SOCIAL STABILITY: SOCIAL INSECURITY: ABUSE: ADULT

## 2024-05-24 SDOH — SOCIAL STABILITY: SOCIAL INSECURITY: HAVE YOU HAD ANY THOUGHTS OF HARMING ANYONE ELSE?: UNABLE TO ASSESS

## 2024-05-24 ASSESSMENT — PAIN - FUNCTIONAL ASSESSMENT
PAIN_FUNCTIONAL_ASSESSMENT: CPOT (CRITICAL CARE PAIN OBSERVATION TOOL)

## 2024-05-24 ASSESSMENT — ACTIVITIES OF DAILY LIVING (ADL)
GROOMING: UNABLE TO ASSESS
ASSISTIVE_DEVICE: OTHER (COMMENT)
HEARING - RIGHT EAR: UNABLE TO ASSESS
BATHING: UNABLE TO ASSESS
ADEQUATE_TO_COMPLETE_ADL: UNABLE TO ASSESS
WALKS IN HOME: UNABLE TO ASSESS
JUDGMENT_ADEQUATE_SAFELY_COMPLETE_DAILY_ACTIVITIES: UNABLE TO ASSESS
DRESSING YOURSELF: UNABLE TO ASSESS
PATIENT'S MEMORY ADEQUATE TO SAFELY COMPLETE DAILY ACTIVITIES?: UNABLE TO ASSESS
FEEDING YOURSELF: UNABLE TO ASSESS
TOILETING: UNABLE TO ASSESS
HEARING - LEFT EAR: UNABLE TO ASSESS

## 2024-05-24 ASSESSMENT — COGNITIVE AND FUNCTIONAL STATUS - GENERAL
HELP NEEDED FOR BATHING: TOTAL
CLIMB 3 TO 5 STEPS WITH RAILING: TOTAL
STANDING UP FROM CHAIR USING ARMS: TOTAL
TURNING FROM BACK TO SIDE WHILE IN FLAT BAD: TOTAL
PERSONAL GROOMING: TOTAL
DAILY ACTIVITIY SCORE: 6
EATING MEALS: TOTAL
TOILETING: TOTAL
MOVING TO AND FROM BED TO CHAIR: TOTAL
PATIENT BASELINE BEDBOUND: NO
MOBILITY SCORE: 6
WALKING IN HOSPITAL ROOM: TOTAL
MOVING FROM LYING ON BACK TO SITTING ON SIDE OF FLAT BED WITH BEDRAILS: TOTAL
DRESSING REGULAR UPPER BODY CLOTHING: TOTAL
DRESSING REGULAR LOWER BODY CLOTHING: TOTAL

## 2024-05-24 ASSESSMENT — PATIENT HEALTH QUESTIONNAIRE - PHQ9
2. FEELING DOWN, DEPRESSED OR HOPELESS: NOT AT ALL
SUM OF ALL RESPONSES TO PHQ9 QUESTIONS 1 & 2: 0
1. LITTLE INTEREST OR PLEASURE IN DOING THINGS: NOT AT ALL

## 2024-05-24 ASSESSMENT — PAIN SCALES - GENERAL
PAINLEVEL_OUTOF10: 0 - NO PAIN

## 2024-05-24 ASSESSMENT — LIFESTYLE VARIABLES
SKIP TO QUESTIONS 9-10: 0
HOW OFTEN DO YOU HAVE 6 OR MORE DRINKS ON ONE OCCASION: PATIENT UNABLE TO ANSWER
AUDIT-C TOTAL SCORE: -1
AUDIT-C TOTAL SCORE: -1
HOW MANY STANDARD DRINKS CONTAINING ALCOHOL DO YOU HAVE ON A TYPICAL DAY: PATIENT UNABLE TO ANSWER
HOW OFTEN DO YOU HAVE A DRINK CONTAINING ALCOHOL: PATIENT UNABLE TO ANSWER

## 2024-05-24 NOTE — PROCEDURES
CENTRAL LINE PLACEMENT      Indication(s):  -Inadequate IV access  -Administration of vasoactive or caustic agents  -CVP monitoring     Site:  - Right internal jugular vein       Catheter: 8.5 Fr, 4 lumen, 20 cm radiopaque polyurethane     Sedation: propofol   Patient positioned supine +/- slight Trendelenburg.  Sterility: Chlorhexidine skin prep. Hat and mask on myself and assistant(s). Antiseptic hand foam. Sterile gown, gloves and drape.  Local anesthesia: 0 mL of 1% lidocaine subcutaneously.  Ultrasound-guided insertion:  -YES (with sterile ultrasound probe cover)    Seldinger technique with 18 Ga x 2.5 in introducer needle. Guidewire thread easily through introducer needle.  -Needle position confirmed to be intravenous by fall of blood to gravity within pressure transduction tubing.  -Guidewire position confirmed to be intravenous by visualization on ultrasound in short and long axis views.  Small skin incision adjacent to guidewire with #11 scalpel. 10 Fr tissue dilator over guidewire. Catheter thread easily over guidewire and guidewire removed. All ports aspirated for complete removal of air, then flushed with sterile NS.  Catheter secured with sutures @ [] cm at skin.  -Biopatch and transparent film dressing.  -Transparent film dressing with CHG.  Sterility maintained throughout procedure. No complications. Patient tolerated well.     Chest x-ray  -done. Catheter in acceptable central venous position. No PTX. (My read).     Other details: None.     ARROW Quad-Lumen Central Venous Catheterization Kit  Lot # [id number]  Exp: 2024-02-28

## 2024-05-24 NOTE — PROGRESS NOTES
05/24/24 1621   Discharge Planning   Patient expects to be discharged to: PT/OT pending        Patient is s/p small bowel resection. She is in ICU, intubated and sedated. Patient is on pressors. She will need PT/OT ordered when appropriate. Will follow for discharge needs.

## 2024-05-24 NOTE — PROGRESS NOTES
Raegan Ramos is a 88 y.o. female on day 1 of admission presenting with SBO (small bowel obstruction) (Multi).    Subjective   HD # 1 for this 88 Year old female who is POD # 1 s/p Mini-Laparotomy, Small Bowel Resection mid jejunum with primary anastomosis (  Approximately 290 cm of ischemic small bowel was resected) resultant small bowel anastomosis between the proximal and distal limbs ) .    This 88 year old female originally presented with greater than one weeks' worth of abdominal pain, nausea emesis, constipation and distension. After the pain progressed she presented to the Hospital and a CT scan was obtained which revealed a high-grade small bowel obstruction with suggestion of internal hernia. Arterial hypo-enhancement of the dilated small bowel loops and suggestions of early ischemia. She was taken to the OR where 290 cm of ischemic small bowel was found and resected. A primary anastomosis was performed ileocecal valve was intact no colonic resection. Post op she was admitted to the   Arrived here in the ICU post procedure, ventilated on vasopressors. 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     PMH   Anemia of renal disease 04/24/2023  Chronic diastolic congestive heart failure (Multi) 09/29/2023  Essential hypertension, benign  NSAID long-term use  Melena    Chronic pain  Dyspnea on exertion  History of low back pain  History of spinal stenosis  Acute bronchitis  Type 2 diabetes mellitus    PSH  Back surgery   10/11/2022  Ct angio coronary art with heart flow if score  ">30%   8/7/2023  Ct heart coronary angiogram 8/7/2023 [facility] CT  Hysterectomy   11/16/2017  Dental Surgery  Left total knee arthroplasty   10/11/2022         SH Nonsmoker non-drinker    Objective     Physical Exam      Remains intubated sedated AC VC+ ventilation RR 20. Converted to SIMV with PSV minute ventilation maintained, symmetric chest expansion not tachycardic ABD post op changes post op appropriate tenderness. Labs reviewed plan ongoing resuscitation reduce sedation attempt WTE.  Remains oligoanuric.     Recorded Vitals  Blood pressure 116/67, pulse 62, temperature 36.2 °C (97.2 °F), temperature source Temporal, resp. rate 23, height 1.651 m (5' 5\"), weight 82.3 kg (181 lb 7 oz), SpO2 100%.  Intake/Output last 3 Shifts:  I/O last 3 completed shifts:  In: 7676.1 (93.3 mL/kg) [I.V.:3636 (44.2 mL/kg); IV Piggyback:4040.1]  Out: 620 (7.5 mL/kg) [Urine:480 (0.2 mL/kg/hr); Emesis/NG output:140]  Weight: 82.3 kg   This patient has a central line   Reason for the central line remaining today? Hemodynamic monitoring    This patient has a urinary catheter   Reason for the urinary catheter remaining today? critically ill patient who need accurate urinary output measurements    This patient is intubated   Reason for patient to remain intubated today? they have continued cardiopulmonary lability/instability             Assessment/Plan   Principal Problem:    SBO (small bowel obstruction) (Multi)    S/p SB resection for ischemic bowel  LABS:  CMP:  Results from last 7 days   Lab Units 05/24/24  0443 05/23/24 2014 05/23/24  1213 05/23/24  0158   SODIUM mmol/L 140 140 133* 138   POTASSIUM mmol/L 5.3 4.6 5.7* 4.1   CHLORIDE mmol/L 112* 113* 103 103   CO2 mmol/L 16* 19* 20* 22   ANION GAP mmol/L 17 13 16 17   BUN mg/dL 43* 36* 30* 25*   CREATININE mg/dL 2.50* 1.87* 1.56* 1.06*   EGFR mL/min/1.73m*2 18* 26* 32* 51*   MAGNESIUM mg/dL  --   --   --  2.20   ALBUMIN g/dL 2.8* 2.7*  --  4.2   ALT U/L 2,941*  --   --  7   AST U/L " "2,683*  --   --  17   BILIRUBIN TOTAL mg/dL 0.5  --   --  0.5   LIPASE U/L  --   --   --  9     CBC:  Results from last 7 days   Lab Units 05/24/24 0443 05/23/24 2014 05/23/24  0158   WBC AUTO x10*3/uL 9.8 13.4* 11.1   HEMOGLOBIN g/dL 9.6* 10.2* 13.0   HEMATOCRIT % 30.9* 33.3* 40.3   PLATELETS AUTO x10*3/uL 160 166 217   MCV fL 100 102* 95     COAG:     ABO: No results found for: \"ABO\"  HEME/ENDO:  Results from last 7 days   Lab Units 05/23/24  0811   HEMOGLOBIN A1C % 6.0*      CARDIAC:   Results from last 7 days   Lab Units 05/23/24  0158   TROPHS ng/L 13         This critically ill patient continues to be at-risk for clinically significant deterioration / failure due to the above mentioned dysfunctional, unstable organ systems.  I have personally identified and managed all complex critical care issues to prevent aforementioned clinical deterioration.  Critical care time is spent at bedside and/or the immediate area and has included, but is not limited to, the review of diagnostic tests, labs, radiographs, serial assessments of hemodynamics, respiratory status, ventilatory management, and family updates.  Time spent in procedures and teaching are reported separately.     CRITICAL CARE TIME: 45  minutes        Nael Dey MD      "

## 2024-05-24 NOTE — ANESTHESIA POSTPROCEDURE EVALUATION
Patient: Raegan Ramos    Procedure Summary       Date: 05/23/24 Room / Location: [facility] A OR 04 / Virtual U A OR    Anesthesia Start: 1638 Anesthesia Stop: 1856    Procedure: Diagnostic Laparoscopy - Converted to Open; Mini-Laparotomy; Small Bowel Resection Diagnosis:       SBO (small bowel obstruction) (Multi)      (SBO (small bowel obstruction) (Multi) [K56.609])    Surgeons: Den Brush MD Responsible Provider: Buddy Chavis MD    Anesthesia Type: general ASA Status: 4            Anesthesia Type: general    Vitals Value Taken Time   /55 05/23/24 1936   Temp 35.2 °C (95.4 °F) 05/23/24 1850   Pulse 69 05/23/24 2040   Resp 14 05/23/24 2040   SpO2 100 % 05/23/24 2040   Vitals shown include unfiled device data.    Anesthesia Post Evaluation    Patient participation: complete - patient cannot participate  Level of consciousness: sedated  Pain management: adequate  Airway patency: patent (intubated)  Cardiovascular status: acceptable (on pressors)  Respiratory status: acceptable and intubated  Hydration status: acceptable  Postoperative Nausea and Vomiting: none        No notable events documented.

## 2024-05-24 NOTE — DISCHARGE INSTRUCTIONS
Instructions After Surgery    Wound Care:  - Ice packs to wounds every hour the first day  - No baths or swimming until follow up appointment  - Keep wounds clean and dry  - Do not apply topical creams or ointments  - Call if you notice redness around wound, foul-smelling drainage, or increasing pain     Diet:   - Soft meals    Activity   - Stairs and walks are fine   - Resume activities gradually    - Ask Dr. Brush before resuming strenuous physical exertions   - No driving while on pain medication    Medications   - Pain medicine prescription attached for severe pain, if needed   - Resume your home medications unless otherwise directed   - To avoid constipation please take Colace 100mg twice a day (over the counter) or Miralax once or twice per day, if needed    Other Instructions   - Call to make appointment within 1-2 days: [phone]   - Call the doctor for the following:  Severe unrelieved pain  Fevers > 101F  Nausea/vomiting  Wound issues  Insurance/return to work forms  Shortness of breath  Chest Pain

## 2024-05-24 NOTE — RESEARCH NOTES
Artificial Intelligence Monitoring in Nursing (AIMS Nursing) Study    Principle Investigator - Dr. Tr Mathews  Research Coordinator - Jovanny Avilez RN     Patient Name - Raegan Ramos  Date - 5/24/2024 10:58 AM  Location - 4th floor ICU-[facility]    Raegan DEVEN Richard was approached by Jovanny Avilez RN to talk about participating in the AIMS Nursing Study. The patient was not able to be approached, a research coordinator will come back at a later time. Study protocol was followed and patient was given study contact information.     Joavnny Avilez RN

## 2024-05-24 NOTE — CONSULTS
"Nutrition Assessment Note  Nutrition Assessment      Reason for Assessment  Reason for Assessment: Provider consult order    RDN initially consulted for NG while pt still in ED.   Pt is s/p lap partial small bowel resection, mini laparotomy yesterday 2/2 SBO (290 cm ischemic bowel resected, with approximately 260 cm remaining small bowel per Op note).     Pt remains intubated. NG to LIS.     History:  Food and Nutrient History  Food and Nutrient History: NPO , intubated, s/p partial SBR       Dietary Orders (From admission, onward)       Start     Ordered    05/23/24 1908  NPO Diet; Effective now  Diet effective now         05/23/24 1912                      Anthropometrics:  Height: 165.1 cm (5' 5\")  Weight: 82.3 kg (181 lb 7 oz)  BMI (Calculated): 30.19    Weight Change  Weight History / % Weight Change: 75.6 kg 4/1/24, 72.1 kg 11/21/23, 74.6 kg 6/8/23  Significant Weight Loss: No     IBW/kg (Dietitian Calculated): 56.8 kg  Percent of IBW: 145 %        Wt Readings from Last 15 Encounters:   05/24/24 82.3 kg (181 lb 7 oz)   04/01/24 75.6 kg (166 lb 10.7 oz)   03/18/24 74.3 kg (163 lb 11.2 oz)   03/01/24 73.9 kg (162 lb 14.7 oz)   02/22/24 73.7 kg (162 lb 7.7 oz)   02/09/24 72.1 kg (158 lb 15.2 oz)   02/06/24 72.6 kg (160 lb)   01/18/24 75.5 kg (166 lb 7.2 oz)   11/21/23 72.1 kg (159 lb)   11/02/23 68 kg (150 lb)   10/12/23 74.2 kg (163 lb 9.3 oz)   08/21/23 71.9 kg (158 lb 9.6 oz)   07/27/23 74.4 kg (164 lb)   07/07/23 77.8 kg (171 lb 8 oz)   06/08/23 74.6 kg (164 lb 6.4 oz)     Scheduled medications  [Held by provider] apixaban, 5 mg, oral, BID  DULoxetine, 90 mg, oral, Daily  insulin lispro, 0-5 Units, subcutaneous, q4h  pantoprazole, 40 mg, intravenous, Daily  piperacillin-tazobactam, 2.25 g, intravenous, q8h  [Held by provider] polyethylene glycol, 17 g, oral, Daily  [Held by provider] pravastatin, 20 mg, oral, Nightly      Continuous medications  norepinephrine, 0-3 mcg/kg/min, Last Rate: 0.1 mcg/kg/min " "(05/24/24 1706)  propofol, 5-50 mcg/kg/min, Last Rate: 10 mcg/kg/min (05/24/24 1302)  sodium chloride 0.9%, 100 mL/hr, Last Rate: 100 mL/hr (05/24/24 1306)  vasopressin, 0.01-0.03 Units/min, Last Rate: Stopped (05/24/24 0900)      PRN medications  PRN medications: [Held by provider] acetaminophen **OR** [Held by provider] acetaminophen **OR** [Held by provider] acetaminophen, albuterol, dextrose, fentaNYL, glucagon, glucagon, insulin regular, ondansetron ODT **OR** ondansetron, oxygen    Propofol @ 4.52 ml/hr provides 119 kcal/day        Latest Reference Range & Units 05/24/24 04:43   GLUCOSE 74 - 99 mg/dL 153 (H)   SODIUM 136 - 145 mmol/L 140   POTASSIUM 3.5 - 5.3 mmol/L 5.3   CHLORIDE 98 - 107 mmol/L 112 (H)   Bicarbonate 21 - 32 mmol/L 16 (L)   Anion Gap 10 - 20 mmol/L 17   Blood Urea Nitrogen 6 - 23 mg/dL 43 (H)   Creatinine 0.50 - 1.05 mg/dL 2.50 (H)   EGFR >60 mL/min/1.73m*2 18 (L)   Calcium 8.6 - 10.3 mg/dL 7.6 (L)   PHOSPHORUS 2.5 - 4.9 mg/dL 5.9 (H)   Albumin 3.4 - 5.0 g/dL 2.8 (L)   Alkaline Phosphatase 33 - 136 U/L 45   ALT 7 - 45 U/L 2,941 (H)   AST 9 - 39 U/L 2,683 (H)   Bilirubin Total 0.0 - 1.2 mg/dL 0.5   Total Protein 6.4 - 8.2 g/dL 4.7 (L)   (H): Data is abnormally high  (L): Data is abnormally low    Energy Needs:  Calculated Energy Needs Using Equations  Height: 165.1 cm (5' 5\")  Weight Used for Equation Calculations: 82.3 kg (181 lb 7 oz)  [facility] Equation (Critically Ill Patients): 1540  Minute Ventilation (L/min): 12.8 L/min  Randolph- St. Jeor Equation (Overweight or Obese Patients): 1264  Equation Chosen to Use by RD: [facility]  Activity Factor: 1.2  Stress Factor: 1.2  Total Energy Needs: 2200  Temp: 36.2 °C (97.2 °F)         Estimated Protein Needs  Method for Estimating Needs: 55-75 g/day (1.0-1.3 g/kg IBW)    Estimated Fluid Needs  Method for Estimating Needs: per MD         Nutrition Focused Physical Findings:  Subcutaneous Fat Loss  Orbital Fat Pads: Mild-Moderate (slight dark " circles and slight hollowing) (NFPE per observation)    Muscle Wasting  Temporalis: Mild-Moderate (slight depression)  Pectoralis (Clavicular Region): Mild-Moderate (some protrusion of clavicle)  Deltoid/Trapezius: Mild-Moderate (slight protrusion of acromion process)    Edema  Edema: none         Physical Findings (Nutrition Deficiency/Toxicity)  Skin: Positive (lap sites)       Nutrition Diagnosis   Malnutrition Diagnosis  Patient has Malnutrition Diagnosis: No    Patient has Nutrition Diagnosis: Yes  Nutrition Diagnosis 1: Altered GI function  Diagnosis Status (1): New  Related to (1): phyisological cause  As Evidenced by (1): small bowel obstruction with ischemia s/p partial small bowel resection                                            Nutrition Interventions/Recommendations   Nutrition Prescription  Individualized Nutrition Prescription Provided for : if pt remains intubated, and enteral support appropriate, recommend start Vital 1.5 @ 15 ml/hr and advance by 10 ml every 6 hours as tolerated to goal rate 45 ml/hr.  - Recommend advance TF as tolerated to goal rate 45 ml/hr (1620 kcal/day, 73 g/day protein, 825 ml free water)   - water flushes per MD, or start with 60 ml every six hours and adjust as indicated   - RFP. Mg daily; replete prn   -  monitor bowel fx and TF tolerance closely     Food and/or Nutrient Delivery Interventions        Enteral Intake: Other (Comment)  Goal: enteral support to start within 48 hour if medically appropriate and bowel fx returns      Additional Interventions: nutrition plan of care to parallel overall plan of care       Coordination of Nutrition Care by a Nutrition Professional  Collaboration and Referral of Nutrition Care: Collaboration by nutrition professional with other providers  - d/w MD and RN       Education Documentation  No documentation found.      Nutrition Monitoring and Evaluation   Food and Nutrient Related History         Enteral and Parenteral Nutrition  Intake: Enteral nutrition formula/solution, Enteral nutrition intake  Criteria: as medically apporpirate      Anthropometrics: Body Composition/Growth/Weight History  Weight: Weight change  Criteria: monitor wt trend      Biochemical Data, Medical Tests and Procedures  Electrolyte and Renal Panel: Other (Comment), Phosphorus, BUN, Potassium, Sodium, Calcium, serum, Chloride, Creatinine, Magnesium  Criteria: as indicated    Gastrointestinal Profile: Other (Comment), Aspartate aminotransferase (AST), Alanine aminotransferase (ALT), Bilirubin, total  Criteria: as indicated    Glucose/Endocrine Profile: Other (Comment), Glucose, casual  Criteria: as indicated    Nutritional Anemia Profile: Other (Comment), Hematocrit, Hemoglobin, Iron, serum  Criteria: as indicated    Vitamin Profile: Other (Comment), Vitamin D, 25 hydroxy  Criteria: as indicated    Nutrition Focused Physical Findings: monitor          Follow Up  Time Spent (min): 60 minutes  Last Date of Nutrition Visit: 05/24/24  Nutrition Follow-Up Needed?: Dietitian to reassess per policy  Follow up Comment: s/p SBR, intubated; BASIA

## 2024-05-24 NOTE — PROGRESS NOTES
"Raegan Ramos is a 88 y.o. female on day 1 of admission presenting with SBO (small bowel obstruction) (Multi).    Subjective   Critically ill    Informed by her nurse that she nods appropriately after sedation is turned down    Remains on low-dose pressors       Objective     Physical Exam  Intubated, sedated  Abdomen slightly distended, incisions clean    Last Recorded Vitals  Blood pressure 116/67, pulse 65, temperature 36.2 °C (97.2 °F), temperature source Temporal, resp. rate (!) 27, height 1.651 m (5' 5\"), weight 82.3 kg (181 lb 7 oz), SpO2 100%.  Intake/Output last 3 Shifts:  I/O last 3 completed shifts:  In: 7676.1 (93.3 mL/kg) [I.V.:3636 (44.2 mL/kg); IV Piggyback:4040.1]  Out: 620 (7.5 mL/kg) [Urine:480 (0.2 mL/kg/hr); Emesis/NG output:140]  Weight: 82.3 kg     Relevant Results  Lab Results   Component Value Date    WBC 9.8 05/24/2024    HGB 9.6 (L) 05/24/2024    HCT 30.9 (L) 05/24/2024     05/24/2024     05/24/2024     Lab Results   Component Value Date    GLUCOSE 153 (H) 05/24/2024     05/24/2024    K 5.3 05/24/2024     (H) 05/24/2024    CO2 16 (L) 05/24/2024    ANIONGAP 17 05/24/2024    BUN 43 (H) 05/24/2024    CREATININE 2.50 (H) 05/24/2024    EGFR 18 (L) 05/24/2024    CALCIUM 7.6 (L) 05/24/2024    ALBUMIN 2.8 (L) 05/24/2024    ALKPHOS 45 05/24/2024    PROT 4.7 (L) 05/24/2024    AST 2,683 (H) 05/24/2024    BILITOT 0.5 05/24/2024    ALT 2,941 (H) 05/24/2024       Assessment/Plan   Principal Problem:    SBO (small bowel obstruction) (Multi)    Postoperative day #1 small bowel resection for intestinal ischemia from internal hernia    Making slow progress.    Oliguric.  Volume resuscitating    Wean vent as tolerated. Wean pressors     Continue nasogastric decompression while awaiting return of GI function     Den Brush MD      "

## 2024-05-24 NOTE — CARE PLAN
Problem: Pain  Goal: My pain/discomfort is manageable  Outcome: Progressing     Problem: Safety  Goal: Patient will be injury free during hospitalization  Outcome: Progressing  Goal: I will remain free of falls  Outcome: Progressing     Problem: Daily Care  Goal: Daily care needs are met  Outcome: Progressing     Problem: Psychosocial Needs  Goal: Demonstrates ability to cope with hospitalization/illness  Outcome: Progressing  Goal: Collaborate with me, my family, and caregiver to identify my specific goals  Outcome: Progressing     Problem: Discharge Barriers  Goal: My discharge needs are met  Outcome: Progressing     Problem: Safety - Medical Restraint  Goal: Remains free of injury from restraints (Restraint for Interference with Medical Device)  Outcome: Progressing  Goal: Free from restraint(s) (Restraint for Interference with Medical Device)  Outcome: Progressing     Problem: Knowledge Deficit  Goal: Patient/family/caregiver demonstrates understanding of disease process, treatment plan, medications, and discharge instructions  Outcome: Progressing     Problem: Mechanical Ventilation  Goal: Patient Will Maintain Patent Airway  Outcome: Progressing  Goal: Oral health is maintained or improved  Outcome: Progressing  Goal: Tracheostomy will be managed safely  Outcome: Progressing  Goal: ET tube will be managed safely  Outcome: Progressing  Goal: Ability to express needs and understand communication  Outcome: Progressing  Goal: Mobility/activity is maintained at optimum level for patient  Outcome: Progressing

## 2024-05-24 NOTE — POST-PROCEDURE NOTE
Ms. Ramos is an 88 year old female who is POD #0 from a diagnostic laparoscopy, mini laparotomy, and partial small bowel resection (approximately 290 cm of ischemic bowel was resected). Patient is intubated and sedated - subjective information unable to be obtained.     PE:  Constitutional: Sedated.   Head/Neck: Neck supple.  Cardiovascular: Normal rate and regular rhythm.   Respiratory/Thorax: Intubated and on ventilator.   Gastrointestinal: Abdomen slightly distended, soft, no peritoneal signs, laparotomy sites c/d/i, well approximated with Dermabond, no erythema or drainage. NG to LIWS with brown bilious output.  Genitourinary: Garcia catheter in place with light yellow urine in bag.   Skin: Warm and dry.    Radiology and labs reviewed. Afebrile.    Plan:   - Diet: NPO  - IVF  - Zosyn  - Continue current pain regimen   - PRN antiemetic   - DVT Proph: SCDs/ ambulate. Eliquis held.   - NG to LIWS. Monitor and record output.  - Monitor VS every hour per ICU.  - Labs ordered for AM   - IS every hour while awake when able  - Encourage ambulation / OOB as tolerated when able  - Monitor lap sites for drainage, erythema, excessive bruising   - Continue supportive ICU care  - F/u with Dr. Brush outpatient in 10-14 days once d/c from hospital     Dispo: Pain control as needed. Continue ICU care. Monitor and record NG output.

## 2024-05-25 ENCOUNTER — APPOINTMENT (OUTPATIENT)
Dept: RADIOLOGY | Facility: HOSPITAL | Age: 89
DRG: 853 | End: 2024-05-25
Payer: MEDICARE

## 2024-05-25 LAB
ALBUMIN SERPL BCP-MCNC: 2.7 G/DL (ref 3.4–5)
ALP SERPL-CCNC: 59 U/L (ref 33–136)
ALT SERPL W P-5'-P-CCNC: >5000 U/L (ref 7–45)
ANION GAP BLDA CALCULATED.4IONS-SCNC: 17 MMO/L (ref 10–25)
ANION GAP BLDA CALCULATED.4IONS-SCNC: 22 MMO/L (ref 10–25)
ANION GAP BLDA CALCULATED.4IONS-SCNC: 27 MMO/L (ref 10–25)
ANION GAP SERPL CALC-SCNC: 27 MMOL/L (ref 10–20)
ANION GAP SERPL CALC-SCNC: 27 MMOL/L (ref 10–20)
AST SERPL W P-5'-P-CCNC: 5873 U/L (ref 9–39)
ATRIAL RATE: 63 BPM
BASE EXCESS BLDA CALC-SCNC: -12.1 MMOL/L (ref -2–3)
BASE EXCESS BLDA CALC-SCNC: -17.7 MMOL/L (ref -2–3)
BASE EXCESS BLDA CALC-SCNC: -20.5 MMOL/L (ref -2–3)
BILIRUB SERPL-MCNC: 1.1 MG/DL (ref 0–1.2)
BODY TEMPERATURE: 37 DEGREES CELSIUS
BUN SERPL-MCNC: 68 MG/DL (ref 6–23)
BUN SERPL-MCNC: 72 MG/DL (ref 6–23)
BUN SERPL-MCNC: 76 MG/DL (ref 6–23)
CA-I BLDA-SCNC: 0.91 MMOL/L (ref 1.1–1.33)
CA-I BLDA-SCNC: 0.95 MMOL/L (ref 1.1–1.33)
CA-I BLDA-SCNC: 1.07 MMOL/L (ref 1.1–1.33)
CALCIUM SERPL-MCNC: 6.8 MG/DL (ref 8.6–10.3)
CALCIUM SERPL-MCNC: 6.9 MG/DL (ref 8.6–10.3)
CHLORIDE BLDA-SCNC: 109 MMOL/L (ref 98–107)
CHLORIDE BLDA-SCNC: 109 MMOL/L (ref 98–107)
CHLORIDE BLDA-SCNC: 110 MMOL/L (ref 98–107)
CHLORIDE SERPL-SCNC: 106 MMOL/L (ref 98–107)
CHLORIDE SERPL-SCNC: 110 MMOL/L (ref 98–107)
CO2 SERPL-SCNC: 11 MMOL/L (ref 21–32)
CO2 SERPL-SCNC: 9 MMOL/L (ref 21–32)
COHGB MFR BLDA: 1.7 %
CREAT SERPL-MCNC: 3.91 MG/DL (ref 0.5–1.05)
CREAT SERPL-MCNC: 4.66 MG/DL (ref 0.5–1.05)
CREAT SERPL-MCNC: 4.89 MG/DL (ref 0.5–1.05)
DO-HGB MFR BLDA: 0.3 % (ref 0–5)
EGFRCR SERPLBLD CKD-EPI 2021: 11 ML/MIN/1.73M*2
EGFRCR SERPLBLD CKD-EPI 2021: 8 ML/MIN/1.73M*2
EGFRCR SERPLBLD CKD-EPI 2021: 9 ML/MIN/1.73M*2
ERYTHROCYTE [DISTWIDTH] IN BLOOD BY AUTOMATED COUNT: 14.6 % (ref 11.5–14.5)
GLUCOSE BLD MANUAL STRIP-MCNC: 107 MG/DL (ref 74–99)
GLUCOSE BLD MANUAL STRIP-MCNC: 124 MG/DL (ref 74–99)
GLUCOSE BLD MANUAL STRIP-MCNC: 137 MG/DL (ref 74–99)
GLUCOSE BLD MANUAL STRIP-MCNC: 141 MG/DL (ref 74–99)
GLUCOSE BLD MANUAL STRIP-MCNC: 168 MG/DL (ref 74–99)
GLUCOSE BLD MANUAL STRIP-MCNC: 174 MG/DL (ref 74–99)
GLUCOSE BLD MANUAL STRIP-MCNC: 184 MG/DL (ref 74–99)
GLUCOSE BLDA-MCNC: 127 MG/DL (ref 74–99)
GLUCOSE BLDA-MCNC: 196 MG/DL (ref 74–99)
GLUCOSE BLDA-MCNC: 313 MG/DL (ref 74–99)
GLUCOSE SERPL-MCNC: 127 MG/DL (ref 74–99)
GLUCOSE SERPL-MCNC: 197 MG/DL (ref 74–99)
HCO3 BLDA-SCNC: 14.7 MMOL/L (ref 22–26)
HCO3 BLDA-SCNC: 7.5 MMOL/L (ref 22–26)
HCO3 BLDA-SCNC: 9 MMOL/L (ref 22–26)
HCT VFR BLD AUTO: 29.4 % (ref 36–46)
HCT VFR BLD EST: 26 % (ref 36–46)
HCT VFR BLD EST: 30 % (ref 36–46)
HCT VFR BLD EST: 31 % (ref 36–46)
HGB BLD-MCNC: 8.6 G/DL (ref 12–16)
HGB BLDA-MCNC: 10.1 G/DL (ref 12–16)
HGB BLDA-MCNC: 10.2 G/DL (ref 12–16)
HGB BLDA-MCNC: 10.2 G/DL (ref 12–16)
HGB BLDA-MCNC: 8.7 G/DL (ref 12–16)
HOLD SPECIMEN: NORMAL
INHALED O2 CONCENTRATION: 40 %
INHALED O2 CONCENTRATION: 40 %
LACTATE BLDA-SCNC: 4.9 MMOL/L (ref 0.4–2)
LACTATE BLDA-SCNC: 8.1 MMOL/L (ref 0.4–2)
LACTATE BLDA-SCNC: 8.6 MMOL/L (ref 0.4–2)
MCH RBC QN AUTO: 31 PG (ref 26–34)
MCHC RBC AUTO-ENTMCNC: 29.3 G/DL (ref 32–36)
MCV RBC AUTO: 106 FL (ref 80–100)
METHGB MFR BLDA: 0.5 % (ref 0–1.5)
NRBC BLD-RTO: 2.9 /100 WBCS (ref 0–0)
OXYHGB MFR BLDA: 96.3 % (ref 94–98)
OXYHGB MFR BLDA: 96.4 % (ref 94–98)
OXYHGB MFR BLDA: 97.4 % (ref 94–98)
OXYHGB MFR BLDA: 97.4 % (ref 94–98)
P AXIS: 61 DEGREES
P OFFSET: 184 MS
P ONSET: 132 MS
PCO2 BLDA: 24 MM HG (ref 38–42)
PCO2 BLDA: 24 MM HG (ref 38–42)
PCO2 BLDA: 36 MM HG (ref 38–42)
PEEP CMH2O: 5 CM H2O
PH BLDA: 7.1 PH (ref 7.38–7.42)
PH BLDA: 7.18 PH (ref 7.38–7.42)
PH BLDA: 7.22 PH (ref 7.38–7.42)
PHOSPHATE SERPL-MCNC: 9 MG/DL (ref 2.5–4.9)
PLATELET # BLD AUTO: 137 X10*3/UL (ref 150–450)
PO2 BLDA: 119 MM HG (ref 85–95)
PO2 BLDA: 125 MM HG (ref 85–95)
PO2 BLDA: 243 MM HG (ref 85–95)
POTASSIUM BLDA-SCNC: 5.7 MMOL/L (ref 3.5–5.3)
POTASSIUM BLDA-SCNC: 5.9 MMOL/L (ref 3.5–5.3)
POTASSIUM BLDA-SCNC: 7.3 MMOL/L (ref 3.5–5.3)
POTASSIUM SERPL-SCNC: 5.7 MMOL/L (ref 3.5–5.3)
POTASSIUM SERPL-SCNC: 6.8 MMOL/L (ref 3.5–5.3)
PR INTERVAL: 166 MS
PROT SERPL-MCNC: 4.6 G/DL (ref 6.4–8.2)
Q ONSET: 215 MS
QRS COUNT: 11 BEATS
QRS DURATION: 142 MS
QT INTERVAL: 462 MS
QTC CALCULATION(BAZETT): 472 MS
QTC FREDERICIA: 469 MS
R AXIS: -4 DEGREES
RBC # BLD AUTO: 2.77 X10*6/UL (ref 4–5.2)
SAO2 % BLDA: 100 % (ref 94–100)
SAO2 % BLDA: 99 % (ref 94–100)
SAO2 % BLDA: 99 % (ref 94–100)
SODIUM BLDA-SCNC: 135 MMOL/L (ref 136–145)
SODIUM BLDA-SCNC: 135 MMOL/L (ref 136–145)
SODIUM BLDA-SCNC: 136 MMOL/L (ref 136–145)
SODIUM SERPL-SCNC: 138 MMOL/L (ref 136–145)
SODIUM SERPL-SCNC: 139 MMOL/L (ref 136–145)
T AXIS: 28 DEGREES
T OFFSET: 446 MS
TIDAL VOLUME: 450 ML
VENTILATOR RATE: 12 BPM
VENTRICULAR RATE: 63 BPM
WBC # BLD AUTO: 12.4 X10*3/UL (ref 4.4–11.3)

## 2024-05-25 PROCEDURE — 2500000004 HC RX 250 GENERAL PHARMACY W/ HCPCS (ALT 636 FOR OP/ED): Performed by: INTERNAL MEDICINE

## 2024-05-25 PROCEDURE — 37799 UNLISTED PX VASCULAR SURGERY: CPT | Performed by: INTERNAL MEDICINE

## 2024-05-25 PROCEDURE — 2500000004 HC RX 250 GENERAL PHARMACY W/ HCPCS (ALT 636 FOR OP/ED): Performed by: STUDENT IN AN ORGANIZED HEALTH CARE EDUCATION/TRAINING PROGRAM

## 2024-05-25 PROCEDURE — 2500000005 HC RX 250 GENERAL PHARMACY W/O HCPCS: Performed by: NURSE PRACTITIONER

## 2024-05-25 PROCEDURE — 2020000001 HC ICU ROOM DAILY

## 2024-05-25 PROCEDURE — 82810 BLOOD GASES O2 SAT ONLY: CPT | Mod: 91

## 2024-05-25 PROCEDURE — 71045 X-RAY EXAM CHEST 1 VIEW: CPT

## 2024-05-25 PROCEDURE — 94003 VENT MGMT INPAT SUBQ DAY: CPT

## 2024-05-25 PROCEDURE — 2500000004 HC RX 250 GENERAL PHARMACY W/ HCPCS (ALT 636 FOR OP/ED): Performed by: SURGERY

## 2024-05-25 PROCEDURE — 84132 ASSAY OF SERUM POTASSIUM: CPT | Mod: 91 | Performed by: INTERNAL MEDICINE

## 2024-05-25 PROCEDURE — 99292 CRITICAL CARE ADDL 30 MIN: CPT | Performed by: SURGERY

## 2024-05-25 PROCEDURE — 36556 INSERT NON-TUNNEL CV CATH: CPT | Performed by: NURSE PRACTITIONER

## 2024-05-25 PROCEDURE — 84100 ASSAY OF PHOSPHORUS: CPT | Performed by: NURSE PRACTITIONER

## 2024-05-25 PROCEDURE — 2500000004 HC RX 250 GENERAL PHARMACY W/ HCPCS (ALT 636 FOR OP/ED): Performed by: NURSE PRACTITIONER

## 2024-05-25 PROCEDURE — 2500000002 HC RX 250 W HCPCS SELF ADMINISTERED DRUGS (ALT 637 FOR MEDICARE OP, ALT 636 FOR OP/ED): Performed by: INTERNAL MEDICINE

## 2024-05-25 PROCEDURE — 94640 AIRWAY INHALATION TREATMENT: CPT

## 2024-05-25 PROCEDURE — 84155 ASSAY OF PROTEIN SERUM: CPT | Mod: 91

## 2024-05-25 PROCEDURE — 2500000002 HC RX 250 W HCPCS SELF ADMINISTERED DRUGS (ALT 637 FOR MEDICARE OP, ALT 636 FOR OP/ED): Performed by: NURSE PRACTITIONER

## 2024-05-25 PROCEDURE — 94681 O2 UPTK CO2 OUTP % O2 XTRC: CPT

## 2024-05-25 PROCEDURE — 37799 UNLISTED PX VASCULAR SURGERY: CPT | Performed by: NURSE PRACTITIONER

## 2024-05-25 PROCEDURE — 82330 ASSAY OF CALCIUM: CPT | Mod: 91

## 2024-05-25 PROCEDURE — 82435 ASSAY OF BLOOD CHLORIDE: CPT | Mod: 91

## 2024-05-25 PROCEDURE — 82947 ASSAY GLUCOSE BLOOD QUANT: CPT | Mod: 91

## 2024-05-25 PROCEDURE — 2500000005 HC RX 250 GENERAL PHARMACY W/O HCPCS: Performed by: SURGERY

## 2024-05-25 PROCEDURE — 99291 CRITICAL CARE FIRST HOUR: CPT | Performed by: SURGERY

## 2024-05-25 PROCEDURE — 84520 ASSAY OF UREA NITROGEN: CPT | Performed by: INTERNAL MEDICINE

## 2024-05-25 PROCEDURE — 82947 ASSAY GLUCOSE BLOOD QUANT: CPT | Mod: 91 | Performed by: INTERNAL MEDICINE

## 2024-05-25 PROCEDURE — 2500000005 HC RX 250 GENERAL PHARMACY W/O HCPCS: Performed by: INTERNAL MEDICINE

## 2024-05-25 PROCEDURE — C9113 INJ PANTOPRAZOLE SODIUM, VIA: HCPCS | Performed by: NURSE PRACTITIONER

## 2024-05-25 PROCEDURE — 99292 CRITICAL CARE ADDL 30 MIN: CPT | Performed by: INTERNAL MEDICINE

## 2024-05-25 PROCEDURE — 82565 ASSAY OF CREATININE: CPT | Performed by: INTERNAL MEDICINE

## 2024-05-25 PROCEDURE — 90937 HEMODIALYSIS REPEATED EVAL: CPT

## 2024-05-25 PROCEDURE — 85027 COMPLETE CBC AUTOMATED: CPT | Performed by: NURSE PRACTITIONER

## 2024-05-25 PROCEDURE — 2500000001 HC RX 250 WO HCPCS SELF ADMINISTERED DRUGS (ALT 637 FOR MEDICARE OP): Performed by: STUDENT IN AN ORGANIZED HEALTH CARE EDUCATION/TRAINING PROGRAM

## 2024-05-25 PROCEDURE — 82247 BILIRUBIN TOTAL: CPT | Mod: 91

## 2024-05-25 PROCEDURE — 71045 X-RAY EXAM CHEST 1 VIEW: CPT | Performed by: RADIOLOGY

## 2024-05-25 RX ORDER — SODIUM BICARBONATE 1 MEQ/ML
50 SYRINGE (ML) INTRAVENOUS ONCE
Status: COMPLETED | OUTPATIENT
Start: 2024-05-25 | End: 2024-05-25

## 2024-05-25 RX ORDER — CALCIUM GLUCONATE 20 MG/ML
2 INJECTION, SOLUTION INTRAVENOUS ONCE
Status: COMPLETED | OUTPATIENT
Start: 2024-05-25 | End: 2024-05-25

## 2024-05-25 RX ORDER — DEXTROSE 50 % IN WATER (D50W) INTRAVENOUS SYRINGE
25 ONCE
Status: COMPLETED | OUTPATIENT
Start: 2024-05-25 | End: 2024-05-25

## 2024-05-25 RX ORDER — NOREPINEPHRINE BITARTRATE/D5W 8 MG/250ML
0-3 PLASTIC BAG, INJECTION (ML) INTRAVENOUS CONTINUOUS
Status: DISCONTINUED | OUTPATIENT
Start: 2024-05-25 | End: 2024-05-26 | Stop reason: HOSPADM

## 2024-05-25 RX ORDER — HEPARIN SODIUM 1000 [USP'U]/ML
1000 INJECTION, SOLUTION INTRAVENOUS; SUBCUTANEOUS AS NEEDED
Status: DISCONTINUED | OUTPATIENT
Start: 2024-05-25 | End: 2024-05-26 | Stop reason: HOSPADM

## 2024-05-25 RX ORDER — ALBUTEROL SULFATE 0.83 MG/ML
20 SOLUTION RESPIRATORY (INHALATION) ONCE
Qty: 24 ML | Refills: 0 | Status: COMPLETED | OUTPATIENT
Start: 2024-05-25 | End: 2024-05-25

## 2024-05-25 RX ADMIN — VASOPRESSIN 0.03 UNITS/MIN: 0.2 INJECTION INTRAVENOUS at 14:11

## 2024-05-25 RX ADMIN — Medication 40 PERCENT: at 15:27

## 2024-05-25 RX ADMIN — PROPOFOL 10 MCG/KG/MIN: 10 INJECTION, EMULSION INTRAVENOUS at 08:17

## 2024-05-25 RX ADMIN — SODIUM CHLORIDE 100 ML/HR: 9 INJECTION, SOLUTION INTRAVENOUS at 00:23

## 2024-05-25 RX ADMIN — Medication 40 PERCENT: at 19:18

## 2024-05-25 RX ADMIN — ALBUTEROL SULFATE 2.5 MG: 2.5 SOLUTION RESPIRATORY (INHALATION) at 06:11

## 2024-05-25 RX ADMIN — SODIUM CHLORIDE 100 ML/HR: 9 INJECTION, SOLUTION INTRAVENOUS at 16:16

## 2024-05-25 RX ADMIN — INSULIN LISPRO 1 UNITS: 100 INJECTION, SOLUTION INTRAVENOUS; SUBCUTANEOUS at 08:17

## 2024-05-25 RX ADMIN — INSULIN LISPRO 1 UNITS: 100 INJECTION, SOLUTION INTRAVENOUS; SUBCUTANEOUS at 12:06

## 2024-05-25 RX ADMIN — CALCIUM CHLORIDE, MAGNESIUM CHLORIDE, DEXTROSE MONOHYDRATE, LACTIC ACID, SODIUM CHLORIDE, SODIUM BICARBONATE AND POTASSIUM CHLORIDE 2000 ML/HR: 5.15; 2.03; 22; 5.4; 6.46; 3.09; .157 INJECTION INTRAVENOUS at 18:49

## 2024-05-25 RX ADMIN — FENTANYL CITRATE 25 MCG: 50 INJECTION, SOLUTION INTRAMUSCULAR; INTRAVENOUS at 02:26

## 2024-05-25 RX ADMIN — CALCIUM CHLORIDE, MAGNESIUM CHLORIDE, DEXTROSE MONOHYDRATE, LACTIC ACID, SODIUM CHLORIDE, SODIUM BICARBONATE AND POTASSIUM CHLORIDE 2000 ML/HR: 5.15; 2.03; 22; 5.4; 6.46; 3.09; .157 INJECTION INTRAVENOUS at 23:17

## 2024-05-25 RX ADMIN — CALCIUM CHLORIDE, MAGNESIUM CHLORIDE, DEXTROSE MONOHYDRATE, LACTIC ACID, SODIUM CHLORIDE, SODIUM BICARBONATE AND POTASSIUM CHLORIDE 2000 ML/HR: 5.15; 2.03; 22; 5.4; 6.46; 3.09; .157 INJECTION INTRAVENOUS at 14:47

## 2024-05-25 RX ADMIN — NOREPINEPHRINE BITARTRATE 0.16 MCG/KG/MIN: 8 INJECTION, SOLUTION INTRAVENOUS at 05:39

## 2024-05-25 RX ADMIN — PIPERACILLIN SODIUM AND TAZOBACTAM SODIUM 2.25 G: 2; .25 INJECTION, SOLUTION INTRAVENOUS at 08:17

## 2024-05-25 RX ADMIN — SODIUM BICARBONATE 50 MEQ: 84 INJECTION INTRAVENOUS at 05:56

## 2024-05-25 RX ADMIN — DULOXETINE HYDROCHLORIDE 90 MG: 30 CAPSULE, DELAYED RELEASE ORAL at 08:17

## 2024-05-25 RX ADMIN — SODIUM BICARBONATE 100 ML/HR: 84 INJECTION, SOLUTION INTRAVENOUS at 06:15

## 2024-05-25 RX ADMIN — PIPERACILLIN SODIUM AND TAZOBACTAM SODIUM 2.25 G: 2; .25 INJECTION, SOLUTION INTRAVENOUS at 23:16

## 2024-05-25 RX ADMIN — Medication 40 PERCENT: at 23:13

## 2024-05-25 RX ADMIN — CALCIUM GLUCONATE 2 G: 20 INJECTION, SOLUTION INTRAVENOUS at 06:15

## 2024-05-25 RX ADMIN — FENTANYL CITRATE 25 MCG: 50 INJECTION, SOLUTION INTRAMUSCULAR; INTRAVENOUS at 14:11

## 2024-05-25 RX ADMIN — PANTOPRAZOLE SODIUM 40 MG: 40 INJECTION, POWDER, FOR SOLUTION INTRAVENOUS at 08:17

## 2024-05-25 RX ADMIN — INSULIN HUMAN 10 UNITS: 100 INJECTION, SOLUTION PARENTERAL at 06:15

## 2024-05-25 RX ADMIN — CALCIUM CHLORIDE, MAGNESIUM CHLORIDE, DEXTROSE MONOHYDRATE, LACTIC ACID, SODIUM CHLORIDE, SODIUM BICARBONATE AND POTASSIUM CHLORIDE 2000 ML/HR: 5.15; 2.03; 22; 5.4; 6.46; 3.09; .157 INJECTION INTRAVENOUS at 20:34

## 2024-05-25 RX ADMIN — PIPERACILLIN SODIUM AND TAZOBACTAM SODIUM 2.25 G: 2; .25 INJECTION, SOLUTION INTRAVENOUS at 16:26

## 2024-05-25 RX ADMIN — Medication 40 PERCENT: at 08:27

## 2024-05-25 RX ADMIN — PIPERACILLIN SODIUM AND TAZOBACTAM SODIUM 2.25 G: 2; .25 INJECTION, SOLUTION INTRAVENOUS at 00:48

## 2024-05-25 RX ADMIN — INSULIN LISPRO 1 UNITS: 100 INJECTION, SOLUTION INTRAVENOUS; SUBCUTANEOUS at 16:26

## 2024-05-25 RX ADMIN — DEXTROSE MONOHYDRATE 25 G: 25 INJECTION, SOLUTION INTRAVENOUS at 06:14

## 2024-05-25 RX ADMIN — FENTANYL CITRATE 25 MCG: 50 INJECTION, SOLUTION INTRAMUSCULAR; INTRAVENOUS at 00:53

## 2024-05-25 RX ADMIN — VASOPRESSIN 0.03 UNITS/MIN: 0.2 INJECTION INTRAVENOUS at 05:39

## 2024-05-25 ASSESSMENT — PAIN - FUNCTIONAL ASSESSMENT
PAIN_FUNCTIONAL_ASSESSMENT: CPOT (CRITICAL CARE PAIN OBSERVATION TOOL)
PAIN_FUNCTIONAL_ASSESSMENT: CPOT (CRITICAL CARE PAIN OBSERVATION TOOL)

## 2024-05-25 NOTE — CONSULTS
Reason For Consult  Multiorgan failure with severe metabolic acidosis    History Of Present Illness  Raegan Ramos is a 88 y.o. female presenting with abdominal pain and vomiting, clutching her abdomen with pain, patient is without up for the past few days.  Evaluation revealed patient high-grade small bowel bowel obstruction with suggestion of internal hernia, his gastric tube is placed General surgery has been evaluating patient, diagnostic laparoscopy-converted to open mini laparotomy: 290 cm of ischemic bowel was resected between the proximal and distal pink/healthy/small bowel segment due to bowel strangulation.  Past medical history positive for diabetes mellitus, hypertension, atrial fibrillation and congestive heart failure  Patient was admitted to ICU subsequently she had multiorgan failures including respiratory failure she is on mechanical ventilation, liver failure due to shock liver with elevated liver enzymes and renal failure with severe acidosis and anuria.  Nephrology was consulted for probable CVVH, condition was discussed with the  and the medical team for poor outcomes of this condition.  ICU physicians placed left IJ dialysis catheter, will use this for CVVH today.  Past Medical History  She has a past medical history of Anemia of renal disease (04/24/2023), Chronic diastolic congestive heart failure (Multi) (09/29/2023), Essential (primary) hypertension, Long term (current) use of non-steroidal anti-inflammatories (nsaid), Melena, Other chronic pain, Other forms of dyspnea, Other specified soft tissue disorders, Pain in right knee (11/19/2020), Pain in unspecified knee, Personal history of other diseases of the musculoskeletal system and connective tissue, Personal history of other diseases of the musculoskeletal system and connective tissue (10/11/2022), Personal history of other diseases of the respiratory system, Personal history of other diseases of the respiratory system  "(02/15/2018), Personal history of other endocrine, nutritional and metabolic disease, Personal history of other endocrine, nutritional and metabolic disease, and Type 2 diabetes mellitus without complications (Multi) (08/15/2018).    Surgical History  She has a past surgical history that includes Other surgical history (10/14/2016); Hysterectomy (11/16/2017); Back surgery (10/11/2022); Total knee arthroplasty (10/11/2022); and CT angio coronary art with heartflow if score >30% (8/7/2023).     Social History  She reports that she has never smoked. She has never used smokeless tobacco. She reports that she does not drink alcohol and does not use drugs.    Family History  No family history on file.     Allergies  House dust, Hydrocodone, and Mold    Review of Systems  All systems were reviewed     Physical Exam  GEN appearance: Sedated and intubated on mechanical ventilation  Head and ENT: Left IJ dialysis catheter noted  Heart: RRR  Lungs; CTA  Abdomen: Surgical dressing noted  Extremities: No edema         I&O 24HR    Intake/Output Summary (Last 24 hours) at 5/25/2024 1405  Last data filed at 5/25/2024 0900  Gross per 24 hour   Intake 250 ml   Output 260 ml   Net -10 ml       Vitals 24HR  Heart Rate:  [62-70]   Temp:  [35.6 °C (96.1 °F)-36.2 °C (97.2 °F)]   Resp:  [19-34]   BP: (96)/(41)   Height:  [165.1 cm (5' 5\")]   Weight:  [82.3 kg (181 lb 7 oz)-84.7 kg (186 lb 11.7 oz)]   SpO2:  [96 %-100 %]         Relevant Results  Results from last 7 days   Lab Units 05/25/24  1124 05/25/24  0448 05/24/24  0443   SODIUM mmol/L 138 139 140   POTASSIUM mmol/L 5.7* 6.8* 5.3   CHLORIDE mmol/L 106 110* 112*   CO2 mmol/L 11* 9* 16*   BUN mg/dL 72* 68* 43*   CREATININE mg/dL 4.66* 3.91* 2.50*   GLUCOSE mg/dL 197* 127* 153*   CALCIUM mg/dL 6.9* 6.8* 7.6*      Results from last 7 days   Lab Units 05/25/24  0448 05/24/24  0443 05/23/24 2014   WBC AUTO x10*3/uL 12.4* 9.8 13.4*   HEMOGLOBIN g/dL 8.6* 9.6* 10.2*   HEMATOCRIT % 29.4* " 30.9* 33.3*   PLATELETS AUTO x10*3/uL 137* 160 166    Electrocardiogram, 12-lead PRN ACS symptoms    Result Date: 5/25/2024  Normal sinus rhythm with sinus arrhythmia Left ventricular hypertrophy with QRS widening ( R in aVL , Yeison product , Romhilt-Nuñez ) Cannot rule out Septal infarct (cited on or before 23-MAY-2024) Abnormal ECG When compared with ECG of 05-AUG-2023 17:07, Previous ECG has undetermined rhythm, needs review Serial changes of Septal infarct Present See ED provider note for full interpretation and clinical correlation Confirmed by Mini Gomez ([id number]) on 5/25/2024 10:52:56 AM    XR chest abdomen for OG NG placement    Result Date: 5/23/2024  Interpreted By:  Lance Howard, STUDY: XR CHEST ABDOMEN FOR OG NG PLACEMENT; ;  5/23/2024 9:54 pm   INDICATION: Signs/Symptoms:CVC line.   COMPARISON: None.   ACCESSION NUMBER(S): [id number]   ORDERING CLINICIAN: ESCOBAR TOLEDO   FINDINGS: Right IJ central venous catheter joints over the distal SVC. No pneumothorax. The heart is enlarged. The lungs are clear.   There is an NG/OG tube coursing below the diaphragm terminating over the expected location of the distal stomach.   There is an ET tube terminating approximately 4.5 cm from the barber. There is spinal stimulator leads and extensive thoracolumbar spinal fusion hardware.   No dilated bowel loops.       Lines and tubes as above.   No evidence of pneumothorax.   MACRO: None   Signed by: Lance Howard 5/23/2024 11:24 PM Dictation workstation:   [id number]    XR chest 1 view    Result Date: 5/23/2024  Interpreted By:  Camden Romeo, STUDY: XR CHEST 1 VIEW;  5/23/2024 11:30 am   INDICATION: Signs/Symptoms:ng placement.   COMPARISON: Chest radiograph 05/23/2024   ACCESSION NUMBER(S): [id number]   ORDERING CLINICIAN: PEDRO NOLAN   FINDINGS: AP radiograph of the chest was provided.   DEVICES: Interval readjustment previously noted feeding tube with the side port now below the  diaphragm and the tip in the proximal gastric body.   CARDIOMEDIASTINAL SILHOUETTE: Cardiomediastinal silhouette is normal in size and configuration.No significant atherosclerotic calcification.   LUNGS: No focal consolidation. No pneumothorax. No pleural effusion.   BONES: Postoperative changes thoracolumbar fusion. Partially imaged stimulation device leads overlying the midline.       1.  Interval readjustment feeding tube, now with normal position.     Signed by: Camden Romeo 5/23/2024 11:35 AM Dictation workstation:   [id number]    XR chest 1 view    Result Date: 5/23/2024  Interpreted By:  Kelvin Wolf, STUDY: XR CHEST 1 VIEW;  5/23/2024 7:13 am   INDICATION: Signs/Symptoms:NGT placement.   COMPARISON: 05/23/2024 chest x-ray and CT chest   ACCESSION NUMBER(S): [id number]   ORDERING CLINICIAN: EJ COFFEY   FINDINGS: G-tube tip at the level of the left hemidiaphragm. Proximal port is in the esophagus. Recommend repositioning.       CARDIOMEDIASTINAL SILHOUETTE: Cardiomegaly stable. Tortuous aorta. Aortic size measurement is not reliable on this examination.   LUNGS: Lungs are clear.   ABDOMEN: No remarkable upper abdominal findings.   BONES: No acute osseous changes.       1.  NG tube tip at the level of the left hemidiaphragm with proximal port in the mid esophagus. Recommend advancement. Heart lungs are otherwise similar.     MACRO: None   Signed by: Kelvin Wolf 5/23/2024 8:13 AM Dictation workstation:   [id number]    XR chest abdomen for OG NG placement    Result Date: 5/23/2024  Interpreted By:  Rajesh Godoy, STUDY: XR CHEST ABDOMEN FOR OG NG PLACEMENT; ;  5/23/2024 6:31 am   INDICATION: Signs/Symptoms:NGT placement attempt #2.   COMPARISON: Chest radiography of 05/20/2024. Correlation also made to CT chest, abdomen and pelvis of 05/20/2024.   ACCESSION NUMBER(S): [id number]   ORDERING CLINICIAN: EJ COFFEY   FINDINGS: 2 AP views of the chest and abdomen.   Enteric tube  approximates the tortuous course of the esophagus with tip in the region of the diaphragmatic hiatus in a sliding hiatal hernia sac, based on correlation with the prior CT. The tube is kinked at the site of its side-port in a focal region of left lateral excursion and kinking of the tortuous esophagus in the inferior mediastinum. Advancement is needed.   Stable cardiomegaly and aortic tortuosity and atherosclerotic calcification.   Minor patchy airspace opacities in the lung bases probably relate to atelectasis. Lungs otherwise appear clear. No pleural effusion or pneumothorax.       Please see above.     MACRO: None   Signed by: Rajesh Godoy 5/23/2024 6:54 AM Dictation workstation:   [id number]    XR chest abdomen for OG NG placement    Result Date: 5/23/2024  Interpreted By:  Rajesh Godoy, STUDY: XR CHEST ABDOMEN FOR OG NG PLACEMENT; ;  5/23/2024 6:12 am   INDICATION: Signs/Symptoms:ng.   COMPARISON: 08/05/2023.   ACCESSION NUMBER(S): [id number]   ORDERING CLINICIAN: EJ COFFEY   FINDINGS: AP semi upright view of the chest.   Enteric tube approximates the proximal esophagus and is looped upon itself in the region of the distal esophagus with its distal end oriented cephalad with tip just proximal to the thoracic inlet, and side-port in the mid-esophagus. Replacement is needed.   Stably enlarged cardiac silhouette and aortic tortuosity with athero sclerotic calcification of the arch.   Patchy bibasilar airspace opacities may relate to atelectasis or pneumonia. No pleural effusion or pneumothorax.       Please see above.     MACRO: None   Signed by: Rajesh Godoy 5/23/2024 6:19 AM Dictation workstation:   [id number]    CT angio chest abdomen pelvis    Result Date: 5/23/2024  Interpreted By:  Rajesh Godoy, STUDY: CT ANGIO CHEST ABDOMEN PELVIS; ;  5/23/2024 3:53 am   INDICATION: Signs/Symptoms:sob. Abdominal pain and vomiting.   COMPARISON: CT abdomen and pelvis of 07/05/2023.   ACCESSION NUMBER(S):  [id number]   ORDERING CLINICIAN: EJ COFFEY   TECHNIQUE: Noncontrast CT of the chest, abdomen and pelvis was followed by CT angiography of the chest, abdomen and pelvis after IV administration of 100 cc Omnipaque 350. Multiplanar, MIP and 3D reformations.   FINDINGS: CHEST:   VESSELS: No evidence of aortic intramural hematoma or dissection. Thoracic aorta is tortuous with moderate partially calcified atherosclerotic plaque in thoracic aorta and arch vessels without significant luminal narrowing. Evaluation of the origins of the celiac axis and SMA is limited by artifact from spinal hardware. There is atherosclerotic plaque at their origins without definite hemodynamically significant narrowing. Moderate to severe focal narrowing of the proximal renal arteries. Mild narrowing of internal iliac, inferior and superior gluteal arteries. No aneurysmal dilation. No other definite hemodynamically significant narrowing of the imaged systemic arteries. Main pulmonary artery is dilated compatible with pulmonary arterial hypertension. No acute pulmonary embolus. Coronary artery calcifications are present; please note that study is not optimized for evaluation of coronary arteries. HEART: Enlarged. Mitral annular calcification. No pericardial effusion. MEDIASTINUM AND JERRICA: No pathologically enlarged thoracic lymph nodes. Circumferential mural thickening of the esophagus with submucosal edema compatible with esophagitis; underlying neoplasm is not excluded. Recommend endoscopic correlation or further evaluation nonemergent outpatient fluoroscopic barium swallow exam. A yellow alert was sent. LUNG, PLEURA, LARGE AIRWAYS: Within normal limits. CHEST WALL AND LOWER NECK: Within normal limits.   ABDOMEN:   BONES: No acute osseous abnormality. Thoracolumbar laminectomy infusion. ABDOMINAL WALL: Within normal limits.   LIVER: Irregular hepatic surface suspicious for cirrhosis. Redemonstrated small cyst in the right lobe. BILE  DUCTS: Normal caliber. GALLBLADDER: No calcified gallstones. No wall thickening. PANCREAS: Pancreatic atrophy, with similar prominence of the pancreatic duct. Otherwise, normal. SPLEEN: Within normal limits. ADRENALS: Nodular thickening of the adrenal glands most likely relates to adenomatous change. KIDNEYS: Vascular calcifications in the left kidney. Otherwise, normal. URETERS: No hydroureter.   PELVIS:   REPRODUCTIVE ORGANS: No pelvic mass or significant free pelvic fluid. BLADDER: Within normal limits.   RETROPERITONEUM: Within normal limits. BOWEL: Stomach is decompressed and partially obscured by artifact and limited for evaluation. Small bowel loops in the left upper quadrant appear normal. The terminal ileum appears normal. There are dilated loops of small bowel in the right upper quadrant and central abdomen which appears somewhat radially oriented upheld a point of central tethering, with tapering of its proximal and distal aspects and mesenteric swirling best seen on coronal imaging (series 603, images 12-81), with mesenteric edema, concerning for internal hernia with closed loop obstruction. There appears to be arterial hypoenhancement of the dilated small bowel loops, with more normal mural enhancement on more delayed imaging suggesting possible early ischemia. No definite pneumatosis intestinalis. Surgical evaluation is recommended.  Appendix is not identified with certainty. PERITONEUM: Moderate volume of ascites. No pneumoperitoneum or loculated intraperitoneal collection.       Small bowel loops in the left upper quadrant appear normal. The terminal ileum appears normal. There are dilated loops of small bowel in the right upper quadrant and central abdomen which appears somewhat radially oriented upheld a point of central tethering, with tapering of its proximal and distal aspects and mesenteric swirling best seen on coronal imaging (series 603, images 12-81), with mesenteric edema, concerning for  internal hernia with closed loop obstruction. There appears to be arterial hypoenhancement of the dilated small bowel loops, with more normal mural enhancement on more delayed imaging suggesting possible early ischemia. No definite pneumatosis intestinalis. Surgical evaluation is recommended.   No evidence of acute abnormality of the imaged systemic arterial vasculature. No acute pulmonary embolus.   Moderate volume of ascites.   No definite evidence of acute pathology in the chest. Dilated main pulmonary artery compatible with pulmonary arterial hypertension. Cardiomegaly.   Circumferential mural thickening of the esophagus with submucosal edema compatible with esophagitis; underlying neoplasm is not excluded. Recommend endoscopic correlation or further evaluation nonemergent outpatient fluoroscopic barium swallow exam. A yellow alert was sent.     MACRO: Critical Finding:  See findings. Notification was initiated on 5/23/2024 at 4:26 am by  Rajesh Godoy.  (**-YCF-**) Instructions:   Signed by: Rajesh Godoy 5/23/2024 4:27 AM Dictation workstation:   [id number]       Assessment/Plan   1.  MATEUSZ with severe metabolic acidosis due to ischemic bowel disease  Will initiate CVVH today, without taking of net fluids  2.  Multiorgan failure including respiratory/hepatic/renal    Poor prognosis was stated, will continue CVVH and monitor outcomes.        Principal Problem:    SBO (small bowel obstruction) (Multi)      I spent 54 minutes in the professional and overall care of this patient.      Anthony Arango MD

## 2024-05-25 NOTE — PROCEDURES
Central Line    Date/Time: 5/25/2024 12:45 PM    Performed by: MAGDALENA Melissa  Authorized by: MAGDALENA Melissa    Consent:     Consent obtained:  Verbal    Consent given by:  Guardian (Javier Starr)    Risks, benefits, and alternatives were discussed: yes      Risks discussed:  Arterial puncture, bleeding, infection, pneumothorax and incorrect placement  Universal protocol:     Procedure explained and questions answered to patient or proxy's satisfaction: yes      Test results available: yes      Imaging studies available: yes      Immediately prior to procedure, a time out was called: yes      Patient identity confirmed:  Hospital-assigned identification number and arm band  Pre-procedure details:     Indication(s): central venous access      Indication(s) comment:  Dialysis    Hand hygiene: Hand hygiene performed prior to insertion      Sterile barrier technique: All elements of maximal sterile technique followed      Skin preparation:  Chlorhexidine    Skin preparation agent: Skin preparation agent completely dried prior to procedure    Sedation:     Sedation type: intubated Propofol.  Anesthesia:     Anesthesia method:  Local infiltration    Local anesthetic:  Lidocaine 1% w/o epi  Procedure details:     Location:  L internal jugular    Patient position:  Supine    Procedural supplies:  Triple lumen (Trialysis)    Landmarks identified: yes      Ultrasound guidance: yes      Ultrasound guidance timing: prior to insertion and real time      Sterile ultrasound techniques: Sterile gel and sterile probe covers were used      Number of attempts:  1    Successful placement: yes    Post-procedure details:     Post-procedure:  Dressing applied and line sutured    Assessment:  Blood return through all ports, no pneumothorax on x-ray, placement verified by x-ray and free fluid flow    Procedure completion:  Tolerated well, no immediate complications  Comments:      Procedure was performed separate  from initial face to face encounter.    40 minutes Critical Care Time

## 2024-05-25 NOTE — CARE PLAN
Problem: Pain  Goal: My pain/discomfort is manageable  Outcome: Progressing     Problem: Safety  Goal: Patient will be injury free during hospitalization  Outcome: Progressing     Problem: Safety - Medical Restraint  Goal: Remains free of injury from restraints (Restraint for Interference with Medical Device)  Outcome: Progressing     Problem: Mechanical Ventilation  Goal: Patient Will Maintain Patent Airway  Outcome: Progressing  Goal: Oral health is maintained or improved  Outcome: Progressing  Goal: Mobility/activity is maintained at optimum level for patient  Outcome: Progressing    The clinical goals for the shift include Patient will be heamodynamically stable    Over the shift, patient remains on pressors. Sedation vacation per orders done. CRRT initiated and patient is tolerating within ordered parameters.

## 2024-05-25 NOTE — PROGRESS NOTES
Raegan Ramos is a 88 y.o. female on day 2 of admission presenting with SBO (small bowel obstruction) (Multi).    Subjective   HD # 2 for this 88 Year old female who is POD # 2 s/p Mini-Laparotomy, Small Bowel Resection mid jejunum with primary anastomosis (  Approximately 290 cm of ischemic small bowel was resected) resultant small bowel anastomosis between the proximal and distal limbs ) .     This 88 year old female originally presented with greater than one weeks' worth of abdominal pain, nausea emesis, constipation and distension. After the pain progressed she presented to the Hospital and a CT scan was obtained which revealed a high-grade small bowel obstruction with suggestion of internal hernia. Arterial hypo-enhancement of the dilated small bowel loops and suggestions of early ischemia. She was taken to the OR where 290 cm of ischemic small bowel was found and resected. A primary anastomosis was performed ileocecal valve was intact no colonic resection. Post op she was admitted to the   Arrived here in the ICU post procedure, ventilated on vasopressors. 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         PMH   Anemia of renal disease          04/24/2023  Chronic diastolic congestive heart failure (Multi)       09/29/2023  Essential hypertension, benign  NSAID long-term use  Melena    Chronic pain  Dyspnea on exertion  History of low back pain  History of spinal stenosis  Acute bronchitis  Type 2 diabetes mellitus     PSH  Back surgery                10/11/2022  Ct angio  coronary art with heart flow if score >30%              8/7/2023  Ct heart coronary angiogram 8/7/2023 [facility] CT  Hysterectomy              11/16/2017  Dental Surgery  Left total knee arthroplasty                 10/11/2022                   SH Nonsmoker non-drinker          Objective     Physical Exam  She remains intubated sedated noted restarting of bicarbonate drip preceded by bolus early this AM vent returned to AC VC+ ventilation RR 20. Minute ventilation maintained, symmetric chest expansion not tachycardic ABD post op changes   no guarding nor rigidity, post op appropriate tenderness. She remains anuric. Labs reviewed. Remains  normonatremic, K 5.7, Bicarbonate serum now down to  11 ( after NaHCO3 infusion reinstituted yesterday note value yesterday serum bicarb 16 ) , Creatinine now 4.89 . Most recent ABG 7.18/24/125/99% Lactate 8.1 in face of ventilator changes persistent metabolic acidosis. Noted General Surgery input. Lengthy discussion with son Matty ( in person)   and daughter Mirta over telephone   .the desperate nature of their mother's critical illness wsa outlined. Multiorgan failure 1) Encephalopathy, 2) Respiratory Failure, 3) Circulatory Failure, 4) Renal Failure, 5) Shock Liver metabolic acidosis was outlined. The negative impact of sequential organ failure on a favorable outcome was also reviewed all questions were answered. The family was allowed to repeat concepts and asked appropriate questions signifying their understanding.  They were noticeably saddened but did request ongoing support. Nephrology was contacted and a HD trialysis line was placed and CVVHD was initiated which initially was poorly tolerated, but with increase in vasopressors was abl to proceed,  Plans were made to reevaluate her condition in 24 hour increments until then to remain a Full code with no limitation of therapeutic or resuscitative measures.   Last Recorded Vitals  Blood pressure (!) 96/41, pulse  "70, temperature 36.2 °C (97.2 °F), temperature source Esophageal, resp. rate (!) 29, height 1.651 m (5' 5\"), weight 84.1 kg (185 lb 6.5 oz), SpO2 100%.  Intake/Output last 3 Shifts:  I/O last 3 completed shifts:  In: 2326.1 (27.5 mL/kg) [I.V.:1336 (15.8 mL/kg); IV Piggyback:990.1]  Out: 620 (7.3 mL/kg) [Urine:280 (0.1 mL/kg/hr); Emesis/NG output:340]  Weight: 84.7 kg            Assessment/Plan   Principal Problem:    SBO (small bowel obstruction) (Multi)  Ischemic small bowel s/p resection  Shock Liver  Encephalopathy   Acute Renal failure  Pressor dependent Shock  LABS:  CMP:  Results from last 7 days   Lab Units 05/25/24  1446 05/25/24  1124 05/25/24  0448 05/24/24  0443 05/23/24 2014 05/23/24  1213 05/23/24  0158   SODIUM mmol/L  --  138 139 140 140 133* 138   POTASSIUM mmol/L  --  5.7* 6.8* 5.3 4.6 5.7* 4.1   CHLORIDE mmol/L  --  106 110* 112* 113* 103 103   CO2 mmol/L  --  11* 9* 16* 19* 20* 22   ANION GAP mmol/L  --  27* 27* 17 13 16 17   BUN mg/dL 76* 72* 68* 43* 36* 30* 25*   CREATININE mg/dL 4.89* 4.66* 3.91* 2.50* 1.87* 1.56* 1.06*   EGFR mL/min/1.73m*2 8* 9* 11* 18* 26* 32* 51*   MAGNESIUM mg/dL  --   --   --   --   --   --  2.20   ALBUMIN g/dL  --   --  2.7* 2.8* 2.7*  --  4.2   ALT U/L  --   --  >5,000* 2,941*  --   --  7   AST U/L  --   --  5,873* 2,683*  --   --  17   BILIRUBIN TOTAL mg/dL  --   --  1.1 0.5  --   --  0.5   LIPASE U/L  --   --   --   --   --   --  9     CBC:  Results from last 7 days   Lab Units 05/25/24 0448 05/24/24 0443 05/23/24 2014 05/23/24 0158   WBC AUTO x10*3/uL 12.4* 9.8 13.4* 11.1   HEMOGLOBIN g/dL 8.6* 9.6* 10.2* 13.0   HEMATOCRIT % 29.4* 30.9* 33.3* 40.3   PLATELETS AUTO x10*3/uL 137* 160 166 217   MCV fL 106* 100 102* 95     COAG:     ABO: No results found for: \"ABO\"  HEME/ENDO:  Results from last 7 days   Lab Units 05/23/24  0811   HEMOGLOBIN A1C % 6.0*      CARDIAC:   Results from last 7 days   Lab Units 05/23/24  0158   TROPHS ng/L 13         This critically ill " patient continues to be at-risk for clinically significant deterioration / failure due to the above mentioned dysfunctional, unstable organ systems.  I have personally identified and managed all complex critical care issues to prevent aforementioned clinical deterioration.  Critical care time is spent at bedside and/or the immediate area and has included, but is not limited to, the review of diagnostic tests, labs, radiographs, serial assessments of hemodynamics, respiratory status, ventilatory management, and family updates.  Time spent in procedures and teaching are reported separately.     Desperately critically ill. Multiorgan failure 1) Encephalopathy, 2) Respiratory Failure, 3) Circulatory Failure, 4) Renal Failure, 5) Shock Liver 6) metabolic acidosis was outlined. The negative impact of sequential organ failure on a favorable outcome was also reviewed all questions were answered. The family was allowed to repeat concepts and asked appropriate questions signifying their understanding.  They were noticeably saddened but did request ongoing support. Nephrology was contacted and a HD trialysis line was placed and CVVHD was initiated which initially was poorly tolerated, but with increase in vasopressors was abl to proceed,  Plans were made to reevaluate her condition in 24 hour increments until then to remain a Full code with no limitation of therapeutic or resuscitative measures.     CRITICAL CARE TIME: 105 minutes    Nael Dey MD

## 2024-05-25 NOTE — PROGRESS NOTES
"Raegan Ramos is a 88 y.o. female on day 2 of admission presenting with SBO (small bowel obstruction) (Multi).    Assessment/Plan   Patient with multiple organ failure following recent surgery for strangulated internal hernia.  Unfortunately it looks like her liver function tests have skyrocketed suggestive of shock liver syndrome.  Prognosis is poor at this point.  Supportive care will continue as per ICU.  Surgery to follow    Subjective   Patient still intubated and on a couple of pressors.  Not very interactive today while sedated.       Objective     Physical Exam  Intubated, sedated  Regular rate in the 70s  On 40% of oxygen  Abdomen soft but she does wince with deep palpation.  Surgical sites are clean      Last Recorded Vitals  Blood pressure (!) 96/41, pulse 70, temperature 35.8 °C (96.4 °F), temperature source Esophageal, resp. rate 22, height 1.651 m (5' 5\"), weight 84.7 kg (186 lb 11.7 oz), SpO2 100%.  Intake/Output last 3 Shifts:  I/O last 3 completed shifts:  In: 2326.1 (27.5 mL/kg) [I.V.:1336 (15.8 mL/kg); IV Piggyback:990.1]  Out: 620 (7.3 mL/kg) [Urine:280 (0.1 mL/kg/hr); Emesis/NG output:340]  Weight: 84.7 kg     Relevant Results    Scheduled medications  [Held by provider] apixaban, 5 mg, oral, BID  DULoxetine, 90 mg, oral, Daily  insulin lispro, 0-5 Units, subcutaneous, q4h  pantoprazole, 40 mg, intravenous, Daily  piperacillin-tazobactam, 2.25 g, intravenous, q8h  [Held by provider] polyethylene glycol, 17 g, oral, Daily  [Held by provider] pravastatin, 20 mg, oral, Nightly      Continuous medications  norepinephrine, 0-3 mcg/kg/min, Last Rate: 0.05 mcg/kg/min (05/25/24 0900)  propofol, 5-50 mcg/kg/min, Last Rate: 10 mcg/kg/min (05/25/24 0817)  sodium bicarbonate, 100 mL/hr, Last Rate: 100 mL/hr (05/25/24 0615)  sodium chloride 0.9%, 100 mL/hr, Last Rate: 100 mL/hr (05/25/24 0053)  vasopressin, 0.01-0.03 Units/min, Last Rate: 0.03 Units/min (05/25/24 0539)      PRN medications  PRN " medications: [Held by provider] acetaminophen **OR** [Held by provider] acetaminophen **OR** [Held by provider] acetaminophen, albuterol, dextrose, fentaNYL, glucagon, glucagon, insulin regular, ondansetron ODT **OR** ondansetron, oxygen    Results for orders placed or performed during the hospital encounter of 05/23/24 (from the past 24 hour(s))   BLOOD GAS ARTERIAL FULL PANEL   Result Value Ref Range    POCT pH, Arterial 7.28 (L) 7.38 - 7.42 pH    POCT pCO2, Arterial 30 (L) 38 - 42 mm Hg    POCT pO2, Arterial 152 (H) 85 - 95 mm Hg    POCT SO2, Arterial 100 94 - 100 %    POCT Oxy Hemoglobin, Arterial 97.0 94.0 - 98.0 %    POCT Hematocrit Calculated, Arterial 32.0 (L) 36.0 - 46.0 %    POCT Sodium, Arterial 135 (L) 136 - 145 mmol/L    POCT Potassium, Arterial 6.6 (HH) 3.5 - 5.3 mmol/L    POCT Chloride, Arterial 111 (H) 98 - 107 mmol/L    POCT Ionized Calcium, Arterial 1.09 (L) 1.10 - 1.33 mmol/L    POCT Glucose, Arterial 170 (H) 74 - 99 mg/dL    POCT Lactate, Arterial 3.2 (H) 0.4 - 2.0 mmol/L    POCT Base Excess, Arterial -11.4 (L) -2.0 - 3.0 mmol/L    POCT HCO3 Calculated, Arterial 14.1 (L) 22.0 - 26.0 mmol/L    POCT Hemoglobin, Arterial 10.6 (L) 12.0 - 16.0 g/dL    POCT Anion Gap, Arterial 17 10 - 25 mmo/L    Patient Temperature 37.0 degrees Celsius    FiO2 40 %    Site of Arterial Puncture Arterial Line    POCT GLUCOSE   Result Value Ref Range    POCT Glucose 148 (H) 74 - 99 mg/dL   POCT GLUCOSE   Result Value Ref Range    POCT Glucose 138 (H) 74 - 99 mg/dL   POCT GLUCOSE   Result Value Ref Range    POCT Glucose 125 (H) 74 - 99 mg/dL   POCT GLUCOSE   Result Value Ref Range    POCT Glucose 124 (H) 74 - 99 mg/dL   POCT GLUCOSE   Result Value Ref Range    POCT Glucose 141 (H) 74 - 99 mg/dL   CBC   Result Value Ref Range    WBC 12.4 (H) 4.4 - 11.3 x10*3/uL    nRBC 2.9 (H) 0.0 - 0.0 /100 WBCs    RBC 2.77 (L) 4.00 - 5.20 x10*6/uL    Hemoglobin 8.6 (L) 12.0 - 16.0 g/dL    Hematocrit 29.4 (L) 36.0 - 46.0 %     (H)  80 - 100 fL    MCH 31.0 26.0 - 34.0 pg    MCHC 29.3 (L) 32.0 - 36.0 g/dL    RDW 14.6 (H) 11.5 - 14.5 %    Platelets 137 (L) 150 - 450 x10*3/uL   Comprehensive Metabolic Panel   Result Value Ref Range    Glucose 127 (H) 74 - 99 mg/dL    Sodium 139 136 - 145 mmol/L    Potassium 6.8 (HH) 3.5 - 5.3 mmol/L    Chloride 110 (H) 98 - 107 mmol/L    Bicarbonate 9 (LL) 21 - 32 mmol/L    Anion Gap 27 (H) 10 - 20 mmol/L    Urea Nitrogen 68 (H) 6 - 23 mg/dL    Creatinine 3.91 (H) 0.50 - 1.05 mg/dL    eGFR 11 (L) >60 mL/min/1.73m*2    Calcium 6.8 (L) 8.6 - 10.3 mg/dL    Albumin 2.7 (L) 3.4 - 5.0 g/dL    Alkaline Phosphatase 59 33 - 136 U/L    Total Protein 4.6 (L) 6.4 - 8.2 g/dL    AST 5,873 (H) 9 - 39 U/L    Bilirubin, Total 1.1 0.0 - 1.2 mg/dL    ALT >5,000 (H) 7 - 45 U/L   Phosphorus   Result Value Ref Range    Phosphorus 9.0 (H) 2.5 - 4.9 mg/dL   BLOOD GAS ARTERIAL FULL PANEL   Result Value Ref Range    POCT pH, Arterial 7.10 (LL) 7.38 - 7.42 pH    POCT pCO2, Arterial 24 (L) 38 - 42 mm Hg    POCT pO2, Arterial 119 (H) 85 - 95 mm Hg    POCT SO2, Arterial 99 94 - 100 %    POCT Oxy Hemoglobin, Arterial 96.4 94.0 - 98.0 %    POCT Hematocrit Calculated, Arterial 26.0 (L) 36.0 - 46.0 %    POCT Sodium, Arterial 136 136 - 145 mmol/L    POCT Potassium, Arterial 7.3 (HH) 3.5 - 5.3 mmol/L    POCT Chloride, Arterial 109 (H) 98 - 107 mmol/L    POCT Ionized Calcium, Arterial 0.95 (L) 1.10 - 1.33 mmol/L    POCT Glucose, Arterial 127 (H) 74 - 99 mg/dL    POCT Lactate, Arterial 8.6 (HH) 0.4 - 2.0 mmol/L    POCT Base Excess, Arterial -20.5 (L) -2.0 - 3.0 mmol/L    POCT HCO3 Calculated, Arterial 7.5 (L) 22.0 - 26.0 mmol/L    POCT Hemoglobin, Arterial 8.7 (L) 12.0 - 16.0 g/dL    POCT Anion Gap, Arterial 27 (H) 10 - 25 mmo/L    Patient Temperature 37.0 degrees Celsius    FiO2 40 %    Ventilator Rate 12 bpm    Tidal Volume 450 mL    Peep CHM2O 5.0 cm H2O   POCT GLUCOSE   Result Value Ref Range    POCT Glucose 168 (H) 74 - 99 mg/dL           I spent  25 minutes in the professional and overall care of this patient.      Jasson Gonzalez MD

## 2024-05-26 VITALS
WEIGHT: 189.6 LBS | BODY MASS INDEX: 31.59 KG/M2 | SYSTOLIC BLOOD PRESSURE: 96 MMHG | TEMPERATURE: 98.8 F | RESPIRATION RATE: 24 BRPM | DIASTOLIC BLOOD PRESSURE: 41 MMHG | OXYGEN SATURATION: 92 % | HEIGHT: 65 IN

## 2024-05-26 LAB
ALBUMIN SERPL BCP-MCNC: 2.4 G/DL (ref 3.4–5)
ALBUMIN SERPL BCP-MCNC: 2.7 G/DL (ref 3.4–5)
ALP SERPL-CCNC: 93 U/L (ref 33–136)
ALP SERPL-CCNC: 93 U/L (ref 33–136)
ALT SERPL W P-5'-P-CCNC: >5000 U/L (ref 7–45)
ALT SERPL W P-5'-P-CCNC: >5000 U/L (ref 7–45)
ANION GAP BLDA CALCULATED.4IONS-SCNC: 27 MMO/L (ref 10–25)
ANION GAP SERPL CALC-SCNC: 28 MMOL/L (ref 10–20)
ANION GAP SERPL CALC-SCNC: 29 MMOL/L (ref 10–20)
AST SERPL W P-5'-P-CCNC: 6107 U/L (ref 9–39)
AST SERPL W P-5'-P-CCNC: 6936 U/L (ref 9–39)
BASE EXCESS BLDA CALC-SCNC: -19.8 MMOL/L (ref -2–3)
BASOPHILS # BLD MANUAL: 0 X10*3/UL (ref 0–0.1)
BASOPHILS NFR BLD MANUAL: 0 %
BILIRUB DIRECT SERPL-MCNC: 1.6 MG/DL (ref 0–0.3)
BILIRUB DIRECT SERPL-MCNC: 1.6 MG/DL (ref 0–0.3)
BILIRUB SERPL-MCNC: 2.7 MG/DL (ref 0–1.2)
BILIRUB SERPL-MCNC: 2.7 MG/DL (ref 0–1.2)
BODY TEMPERATURE: 37 DEGREES CELSIUS
BUN SERPL-MCNC: 59 MG/DL (ref 6–23)
BUN SERPL-MCNC: 60 MG/DL (ref 6–23)
BURR CELLS BLD QL SMEAR: ABNORMAL
CA-I BLD-SCNC: 0.9 MMOL/L (ref 1.1–1.33)
CA-I BLDA-SCNC: 1.06 MMOL/L (ref 1.1–1.33)
CALCIUM SERPL-MCNC: 7.6 MG/DL (ref 8.6–10.3)
CHLORIDE BLDA-SCNC: 105 MMOL/L (ref 98–107)
CHLORIDE SERPL-SCNC: 104 MMOL/L (ref 98–107)
CHLORIDE SERPL-SCNC: 105 MMOL/L (ref 98–107)
CO2 SERPL-SCNC: 10 MMOL/L (ref 21–32)
CO2 SERPL-SCNC: 9 MMOL/L (ref 21–32)
CREAT SERPL-MCNC: 3.9 MG/DL (ref 0.5–1.05)
CREAT SERPL-MCNC: 4.04 MG/DL (ref 0.5–1.05)
EGFRCR SERPLBLD CKD-EPI 2021: 10 ML/MIN/1.73M*2
EGFRCR SERPLBLD CKD-EPI 2021: 11 ML/MIN/1.73M*2
EOSINOPHIL # BLD MANUAL: 0 X10*3/UL (ref 0–0.4)
EOSINOPHIL NFR BLD MANUAL: 0 %
ERYTHROCYTE [DISTWIDTH] IN BLOOD BY AUTOMATED COUNT: 15 % (ref 11.5–14.5)
GLUCOSE BLD MANUAL STRIP-MCNC: 101 MG/DL (ref 74–99)
GLUCOSE BLD MANUAL STRIP-MCNC: 27 MG/DL (ref 74–99)
GLUCOSE BLD MANUAL STRIP-MCNC: 32 MG/DL (ref 74–99)
GLUCOSE BLD MANUAL STRIP-MCNC: 38 MG/DL (ref 74–99)
GLUCOSE BLD MANUAL STRIP-MCNC: 41 MG/DL (ref 74–99)
GLUCOSE BLD MANUAL STRIP-MCNC: 43 MG/DL (ref 74–99)
GLUCOSE BLD MANUAL STRIP-MCNC: 43 MG/DL (ref 74–99)
GLUCOSE BLD MANUAL STRIP-MCNC: 49 MG/DL (ref 74–99)
GLUCOSE BLD MANUAL STRIP-MCNC: 78 MG/DL (ref 74–99)
GLUCOSE BLD MANUAL STRIP-MCNC: 97 MG/DL (ref 74–99)
GLUCOSE BLDA-MCNC: 43 MG/DL (ref 74–99)
GLUCOSE SERPL-MCNC: 43 MG/DL (ref 74–99)
HCO3 BLDA-SCNC: 7.5 MMOL/L (ref 22–26)
HCT VFR BLD AUTO: 26.7 % (ref 36–46)
HCT VFR BLD EST: 23 % (ref 36–46)
HGB BLD-MCNC: 8.1 G/DL (ref 12–16)
HGB BLDA-MCNC: 7.7 G/DL (ref 12–16)
HOLD SPECIMEN: NORMAL
IMM GRANULOCYTES # BLD AUTO: 0.18 X10*3/UL (ref 0–0.5)
IMM GRANULOCYTES NFR BLD AUTO: 1.7 % (ref 0–0.9)
INHALED O2 CONCENTRATION: 40 %
LACTATE BLDA-SCNC: 12.7 MMOL/L (ref 0.4–2)
LYMPHOCYTES # BLD MANUAL: 0.82 X10*3/UL (ref 0.8–3)
LYMPHOCYTES NFR BLD MANUAL: 8 %
MAGNESIUM SERPL-MCNC: 2 MG/DL (ref 1.6–2.4)
MAGNESIUM SERPL-MCNC: 2 MG/DL (ref 1.6–2.4)
MCH RBC QN AUTO: 31 PG (ref 26–34)
MCHC RBC AUTO-ENTMCNC: 30.3 G/DL (ref 32–36)
MCV RBC AUTO: 102 FL (ref 80–100)
MONOCYTES # BLD MANUAL: 0.82 X10*3/UL (ref 0.05–0.8)
MONOCYTES NFR BLD MANUAL: 8 %
NEUTROPHILS # BLD MANUAL: 8.14 X10*3/UL (ref 1.6–5.5)
NEUTS BAND # BLD MANUAL: 0.21 X10*3/UL (ref 0–0.5)
NEUTS BAND NFR BLD MANUAL: 2 %
NEUTS SEG # BLD MANUAL: 7.93 X10*3/UL (ref 1.6–5)
NEUTS SEG NFR BLD MANUAL: 77 %
NRBC BLD-RTO: 67.2 /100 WBCS (ref 0–0)
OVALOCYTES BLD QL SMEAR: ABNORMAL
OXYHGB MFR BLDA: 93.6 % (ref 94–98)
PCO2 BLDA: 22 MM HG (ref 38–42)
PH BLDA: 7.14 PH (ref 7.38–7.42)
PHOSPHATE SERPL-MCNC: 7.1 MG/DL (ref 2.5–4.9)
PHOSPHATE SERPL-MCNC: 8.5 MG/DL (ref 2.5–4.9)
PLATELET # BLD AUTO: 100 X10*3/UL (ref 150–450)
PO2 BLDA: 147 MM HG (ref 85–95)
POTASSIUM BLDA-SCNC: 6.2 MMOL/L (ref 3.5–5.3)
POTASSIUM SERPL-SCNC: 5.7 MMOL/L (ref 3.5–5.3)
POTASSIUM SERPL-SCNC: 6 MMOL/L (ref 3.5–5.3)
PROT SERPL-MCNC: 4.1 G/DL (ref 6.4–8.2)
PROT SERPL-MCNC: 4.6 G/DL (ref 6.4–8.2)
RBC # BLD AUTO: 2.61 X10*6/UL (ref 4–5.2)
RBC MORPH BLD: ABNORMAL
SAO2 % BLDA: 100 % (ref 94–100)
SODIUM BLDA-SCNC: 133 MMOL/L (ref 136–145)
SODIUM SERPL-SCNC: 136 MMOL/L (ref 136–145)
SODIUM SERPL-SCNC: 137 MMOL/L (ref 136–145)
TOTAL CELLS COUNTED BLD: 100
VARIANT LYMPHS # BLD MANUAL: 0.52 X10*3/UL (ref 0–0.3)
VARIANT LYMPHS NFR BLD: 5 %
WBC # BLD AUTO: 10.3 X10*3/UL (ref 4.4–11.3)

## 2024-05-26 PROCEDURE — 82947 ASSAY GLUCOSE BLOOD QUANT: CPT | Mod: 91,MUE

## 2024-05-26 PROCEDURE — C9113 INJ PANTOPRAZOLE SODIUM, VIA: HCPCS | Performed by: NURSE PRACTITIONER

## 2024-05-26 PROCEDURE — 84075 ASSAY ALKALINE PHOSPHATASE: CPT | Mod: 91 | Performed by: INTERNAL MEDICINE

## 2024-05-26 PROCEDURE — 82435 ASSAY OF BLOOD CHLORIDE: CPT | Performed by: NURSE PRACTITIONER

## 2024-05-26 PROCEDURE — 80069 RENAL FUNCTION PANEL: CPT | Performed by: INTERNAL MEDICINE

## 2024-05-26 PROCEDURE — 99291 CRITICAL CARE FIRST HOUR: CPT | Performed by: SURGERY

## 2024-05-26 PROCEDURE — 37799 UNLISTED PX VASCULAR SURGERY: CPT | Performed by: INTERNAL MEDICINE

## 2024-05-26 PROCEDURE — 82565 ASSAY OF CREATININE: CPT | Performed by: INTERNAL MEDICINE

## 2024-05-26 PROCEDURE — 84520 ASSAY OF UREA NITROGEN: CPT | Performed by: INTERNAL MEDICINE

## 2024-05-26 PROCEDURE — 85018 HEMOGLOBIN: CPT | Mod: 91

## 2024-05-26 PROCEDURE — 2500000005 HC RX 250 GENERAL PHARMACY W/O HCPCS: Performed by: INTERNAL MEDICINE

## 2024-05-26 PROCEDURE — 85027 COMPLETE CBC AUTOMATED: CPT | Performed by: INTERNAL MEDICINE

## 2024-05-26 PROCEDURE — 2500000004 HC RX 250 GENERAL PHARMACY W/ HCPCS (ALT 636 FOR OP/ED): Performed by: INTERNAL MEDICINE

## 2024-05-26 PROCEDURE — 83735 ASSAY OF MAGNESIUM: CPT | Mod: 91 | Performed by: INTERNAL MEDICINE

## 2024-05-26 PROCEDURE — 2500000004 HC RX 250 GENERAL PHARMACY W/ HCPCS (ALT 636 FOR OP/ED): Performed by: NURSE PRACTITIONER

## 2024-05-26 PROCEDURE — 37799 UNLISTED PX VASCULAR SURGERY: CPT | Performed by: NURSE PRACTITIONER

## 2024-05-26 PROCEDURE — 2500000004 HC RX 250 GENERAL PHARMACY W/ HCPCS (ALT 636 FOR OP/ED): Mod: JZ

## 2024-05-26 PROCEDURE — 94681 O2 UPTK CO2 OUTP % O2 XTRC: CPT | Mod: MUE

## 2024-05-26 PROCEDURE — 85007 BL SMEAR W/DIFF WBC COUNT: CPT | Performed by: INTERNAL MEDICINE

## 2024-05-26 PROCEDURE — 2500000005 HC RX 250 GENERAL PHARMACY W/O HCPCS: Performed by: NURSE PRACTITIONER

## 2024-05-26 PROCEDURE — 2500000004 HC RX 250 GENERAL PHARMACY W/ HCPCS (ALT 636 FOR OP/ED): Performed by: SURGERY

## 2024-05-26 PROCEDURE — 94003 VENT MGMT INPAT SUBQ DAY: CPT

## 2024-05-26 PROCEDURE — 2500000004 HC RX 250 GENERAL PHARMACY W/ HCPCS (ALT 636 FOR OP/ED): Performed by: STUDENT IN AN ORGANIZED HEALTH CARE EDUCATION/TRAINING PROGRAM

## 2024-05-26 PROCEDURE — 80047 BASIC METABLC PNL IONIZED CA: CPT | Performed by: INTERNAL MEDICINE

## 2024-05-26 PROCEDURE — 84155 ASSAY OF PROTEIN SERUM: CPT | Mod: 91,MUE | Performed by: INTERNAL MEDICINE

## 2024-05-26 PROCEDURE — 84100 ASSAY OF PHOSPHORUS: CPT | Performed by: INTERNAL MEDICINE

## 2024-05-26 RX ORDER — CALCIUM GLUCONATE 20 MG/ML
1 INJECTION, SOLUTION INTRAVENOUS ONCE
Status: COMPLETED | OUTPATIENT
Start: 2024-05-26 | End: 2024-05-26

## 2024-05-26 RX ORDER — CALCIUM GLUCONATE 20 MG/ML
2 INJECTION, SOLUTION INTRAVENOUS ONCE
Status: COMPLETED | OUTPATIENT
Start: 2024-05-26 | End: 2024-05-26

## 2024-05-26 RX ADMIN — CALCIUM GLUCONATE 2 G: 20 INJECTION, SOLUTION INTRAVENOUS at 04:04

## 2024-05-26 RX ADMIN — CALCIUM CHLORIDE, MAGNESIUM CHLORIDE, DEXTROSE MONOHYDRATE, LACTIC ACID, SODIUM CHLORIDE, SODIUM BICARBONATE AND POTASSIUM CHLORIDE 2000 ML/HR: 5.15; 2.03; 22; 5.4; 6.46; 3.09; .157 INJECTION INTRAVENOUS at 01:56

## 2024-05-26 RX ADMIN — SODIUM CHLORIDE 100 ML/HR: 9 INJECTION, SOLUTION INTRAVENOUS at 00:36

## 2024-05-26 RX ADMIN — Medication 40 PERCENT: at 07:42

## 2024-05-26 RX ADMIN — VASOPRESSIN 0.03 UNITS/MIN: 0.2 INJECTION INTRAVENOUS at 00:30

## 2024-05-26 RX ADMIN — CALCIUM GLUCONATE 1 G: 20 INJECTION, SOLUTION INTRAVENOUS at 06:33

## 2024-05-26 RX ADMIN — Medication 40 PERCENT: at 11:28

## 2024-05-26 RX ADMIN — DEXTROSE MONOHYDRATE 12.5 G: 25 INJECTION, SOLUTION INTRAVENOUS at 05:20

## 2024-05-26 RX ADMIN — NOREPINEPHRINE BITARTRATE 0.35 MCG/KG/MIN: 8 INJECTION, SOLUTION INTRAVENOUS at 11:33

## 2024-05-26 RX ADMIN — NOREPINEPHRINE BITARTRATE 0.06 MCG/KG/MIN: 8 INJECTION, SOLUTION INTRAVENOUS at 00:29

## 2024-05-26 RX ADMIN — VASOPRESSIN 0.03 UNITS/MIN: 0.2 INJECTION INTRAVENOUS at 11:33

## 2024-05-26 RX ADMIN — DEXTROSE MONOHYDRATE 12.5 G: 25 INJECTION, SOLUTION INTRAVENOUS at 11:49

## 2024-05-26 RX ADMIN — Medication 40 PERCENT: at 02:59

## 2024-05-26 RX ADMIN — PROPOFOL 10 MCG/KG/MIN: 10 INJECTION, EMULSION INTRAVENOUS at 00:29

## 2024-05-26 RX ADMIN — PANTOPRAZOLE SODIUM 40 MG: 40 INJECTION, POWDER, FOR SOLUTION INTRAVENOUS at 08:05

## 2024-05-26 RX ADMIN — DEXTROSE MONOHYDRATE 12.5 G: 25 INJECTION, SOLUTION INTRAVENOUS at 08:05

## 2024-05-26 RX ADMIN — PIPERACILLIN SODIUM AND TAZOBACTAM SODIUM 2.25 G: 2; .25 INJECTION, SOLUTION INTRAVENOUS at 08:05

## 2024-05-26 ASSESSMENT — PAIN - FUNCTIONAL ASSESSMENT: PAIN_FUNCTIONAL_ASSESSMENT: CPOT (CRITICAL CARE PAIN OBSERVATION TOOL)

## 2024-05-26 NOTE — NURSING NOTE
Pt's  catheter will not draw back blood.  Spoke with Dr. Dey regarding issue.  Dr. Dey told me to turn the trialysis catheter and raise the patients legs.  Both were unsuccessful.  Dr. Dey told me to not reconnect the patient to CRRT and that he will be in the room to speak with the patient's family.

## 2024-05-26 NOTE — CARE PLAN
Problem: Pain  Goal: My pain/discomfort is manageable  Outcome: Progressing     Problem: Safety  Goal: Patient will be injury free during hospitalization  Outcome: Progressing     Problem: Mechanical Ventilation  Goal: Patient Will Maintain Patent Airway  Outcome: Progressing     Problem: Skin  Goal: Prevent/manage excess moisture  Outcome: Progressing  Goal: Prevent/minimize sheer/friction injuries  Outcome: Progressing  Flowsheets (Taken 5/26/2024 2938)  Prevent/minimize sheer/friction injuries:   Turn/reposition every 2 hours/use positioning/transfer devices   HOB 30 degrees or less

## 2024-05-26 NOTE — PROGRESS NOTES
Raegan Ramos is a 88 y.o. female on day 3 of admission presenting with SBO (small bowel obstruction) (Multi).      Subjective   Overall doing poorly, discussed with family numbers in the room the clinical situation.         Objective        Vitals 24HR  Heart Rate:  [67-79]   Temp:  [34.5 °C (94.1 °F)-37 °C (98.6 °F)]   Resp:  [20-32]   Weight:  [84.1 kg (185 lb 6.5 oz)-85.6 kg (188 lb 11.4 oz)]   SpO2:  [89 %-100 %]     Intake/Output last 3 Shifts:    Intake/Output Summary (Last 24 hours) at 5/26/2024 1314  Last data filed at 5/26/2024 0835  Gross per 24 hour   Intake 2804.77 ml   Output 1040 ml   Net 1764.77 ml       Physical Exam    GEN appearance: Sedated and intubated on mechanical ventilation  Head and ENT: Left IJ dialysis catheter noted  Heart: RRR  Lungs; CTA  Abdomen: Surgical dressing noted  Extremities: No edema       Relevant Results     Results from last 7 days   Lab Units 05/26/24  0117 05/25/24  0448 05/24/24  0443   WBC AUTO x10*3/uL 10.3 12.4* 9.8   HEMOGLOBIN g/dL 8.1* 8.6* 9.6*   HEMATOCRIT % 26.7* 29.4* 30.9*   PLATELETS AUTO x10*3/uL 100* 137* 160      Results from last 7 days   Lab Units 05/26/24  0510 05/26/24  0117 05/25/24  1446 05/25/24  1124 05/25/24  0448   SODIUM mmol/L 137 136  --  138 139   POTASSIUM mmol/L 6.0* 5.7*  --  5.7* 6.8*   CHLORIDE mmol/L 105 104  --  106 110*   CO2 mmol/L 9* 10*  --  11* 9*   BUN mg/dL 60* 59* 76* 72* 68*   CREATININE mg/dL 4.04* 3.90* 4.89* 4.66* 3.91*   GLUCOSE mg/dL 43*  --   --  197* 127*   CALCIUM mg/dL 7.6*  --   --  6.9* 6.8*        Current Facility-Administered Medications:     [Held by provider] acetaminophen (Tylenol) tablet 650 mg, 650 mg, oral, q4h PRN **OR** [Held by provider] acetaminophen (Tylenol) oral liquid 650 mg, 650 mg, nasogastric tube, q4h PRN **OR** [Held by provider] acetaminophen (Tylenol) suppository 650 mg, 650 mg, rectal, q4h PRN, Natasha Summers, APRN-CNP    albuterol 2.5 mg /3 mL (0.083 %) nebulizer solution 2.5 mg, 2.5  mg, nebulization, q2h PRN, Estephania Dotson MD    [Held by provider] apixaban (Eliquis) tablet 5 mg, 5 mg, oral, BID, Estephania Dotson MD    dextrose 50 % injection 12.5 g, 12.5 g, intravenous, q15 min PRN, Bretzyl M Liscoe, APRN-CNP, 12.5 g at 05/26/24 1149    DULoxetine (Cymbalta) DR capsule 90 mg, 90 mg, oral, Daily, Estephania Dotson MD, 90 mg at 05/25/24 0817    fentaNYL PF (Sublimaze) injection 25 mcg, 25 mcg, intravenous, q1h PRN, Bretzyl M Liscoe, APRN-CNP, 25 mcg at 05/25/24 1411    glucagon (Glucagen) injection 1 mg, 1 mg, intramuscular, q15 min PRN, Estephania Dotson MD    glucagon (Glucagen) injection 1 mg, 1 mg, intramuscular, q15 min PRN, Hermelindayl M Liscoe, APRN-CNP    heparin 1,000 unit/mL injection 1,000 Units, 1,000 Units, intra-catheter, PRN, Anthony Arango MD    heparin 1,000 unit/mL injection 1,000 Units, 1,000 Units, intra-catheter, PRN, Anthony Arango MD    insulin lispro (HumaLOG) injection 0-5 Units, 0-5 Units, subcutaneous, q4h, Bretzyl M Liscoe, APRN-CNP, 1 Units at 05/25/24 1626    insulin regular (HumuLIN, NovoLIN) bolus from bag 2-6 Units, 2-6 Units, intravenous, PRN, Bretzyl M Liscoe, APRN-CNP    norepinephrine (Levophed) 8 mg in dextrose 5% 250 mL (0.032 mg/mL) infusion (premix), 0-3 mcg/kg/min, intravenous, Continuous, Godfrey Leggett MD, Last Rate: 42.4 mL/hr at 05/26/24 1200, 0.3 mcg/kg/min at 05/26/24 1200    ondansetron ODT (Zofran-ODT) disintegrating tablet 4 mg, 4 mg, oral, q8h PRN **OR** ondansetron (Zofran) injection 4 mg, 4 mg, intravenous, q8h PRN, Estephania Dotson MD    oxygen (O2) therapy, , inhalation, Continuous PRN - O2/gases, MAGDALENA Carlson, 40 percent at 05/26/24 1128    pantoprazole (ProtoNix) injection 40 mg, 40 mg, intravenous, Daily, MAGDALENA Carlson, 40 mg at 05/26/24 0805    piperacillin-tazobactam-dextrose (Zosyn) IV 2.25 g, 2.25 g, intravenous, q8h, Nael Dey MD, Stopped at 05/26/24 0835     [Held by provider] polyethylene glycol (Glycolax, Miralax) packet 17 g, 17 g, oral, Daily, Natasha Summers APRN-CNP    [Held by provider] pravastatin (Pravachol) tablet 20 mg, 20 mg, oral, Nightly, Estephania Dotson MD, 20 mg at 05/23/24 2145    PrismaSol BGK 2/3.5 CRRT solution, 2,000 mL/hr, CRRT, Continuous, Anthony Arango MD, Last Rate: 2,000 mL/hr at 05/26/24 0156, 2,000 mL/hr at 05/26/24 0156    propofol (Diprivan) infusion, 5-50 mcg/kg/min, intravenous, Continuous, MEGHAN Carlson-CNP, Stopped at 05/26/24 0730    vasopressin (Vasostrict) 0.2 unit/mL infusion, 0.01-0.03 Units/min, intravenous, Continuous, MEGHAN Carlson-CNP, Last Rate: 9 mL/hr at 05/26/24 1133, 0.03 Units/min at 05/26/24 1133           Assessment/Plan   This patient currently has cardiac telemetry ordered; if you would like to modify or discontinue the telemetry order, click here to go to the orders activity to modify/discontinue the order.    1.  MATEUSZ with severe metabolic acidosis due to ischemic bowel disease  Will initiate CVVH today, without taking of net fluids  CVVH had to be discontinued later last night due to overall stability.  2.  Multiorgan failure including respiratory/hepatic/renal     Poor prognosis was stated, will continue CVVH and monitor outcomes.  ICU attending discussed with the family members about the situation with DNR status status established I discussed also overall condition pending thank members doing full attendance.       I spent 35 minutes in the professional and overall care of this patient.      Anthony Arango MD

## 2024-05-26 NOTE — PROGRESS NOTES
"Raegan Ramos is a 88 y.o. female on day 3 of admission presenting with SBO (small bowel obstruction) (Multi).    Diagnosis   SBO (small bowel obstruction)  Septic Shock  Multi-Organ Faliure   Encephalopathy of sepsis  Respiratory Failure   Renal Failure   SBO (small bowel obstruction)  Septic Shock  Multi-Organ Faliure   Encephalopathy of sepsis  Respiratory Failure   Renal Failure    Last Recorded Vitals  Blood pressure (!) 96/41, pulse 68, temperature 37 °C (98.6 °F), temperature source Esophageal, resp. rate (!) 29, height 1.651 m (5' 5\"), weight 85.6 kg (188 lb 11.4 oz), SpO2 92%.  Intake/Output last 3 Shifts:  I/O last 3 completed shifts:  In: 3004.8 (35.1 mL/kg) [I.V.:2654.8 (31 mL/kg); IV Piggyback:350]  Out: 1287 (15 mL/kg) [Urine:64 (0 mL/kg/hr); Emesis/NG output:300; Other:923]  Weight: 85.6 kg   HD # 3 called to bedside to evaluate absence of electrical activity and absence of pulses for this 88 Year old female who is POD # 3 s/p Mini-Laparotomy, Small Bowel Resection mid jejunum with primary anastomosis (  Approximately 290 cm of ischemic small bowel was resected) resultant small bowel anastomosis between the proximal and distal limbs ) .     This 88 year old female originally presented with greater than one weeks' worth of abdominal pain, nausea emesis, constipation and distension. After the pain progressed she presented to the Hospital and a CT scan was obtained which revealed a high-grade small bowel obstruction with suggestion of internal hernia. Arterial hypo-enhancement of the dilated small bowel loops and suggestions of early ischemia. She was taken to the OR where 290 cm of ischemic small bowel was found and resected. A primary anastomosis was performed ileocecal valve was intact no colonic resection. Post op she was admitted to the   Arrived here in the ICU post procedure, ventilated on vasopressors. 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      PMH   Anemia of renal disease          04/24/2023  Chronic diastolic congestive heart failure (Multi)       09/29/2023  Essential hypertension, benign  NSAID long-term use  Melena    Chronic pain  Dyspnea on exertion  History of low back pain  History of spinal stenosis  Acute bronchitis  Type 2 diabetes mellitus     PSH  Back surgery                10/11/2022  Ct angio coronary art with heart flow if score >30%              8/7/2023  Ct heart coronary angiogram 8/7/2023 [facility] CT  Hysterectomy              11/16/2017  Dental Surgery  Left total knee arthroplasty                 10/11/2022                   SH Nonsmoker non-drinker        Evaluated at bedside no activity (flat line)  on EKG monitor . NO palpable pulses carotid nor detectable heart tomes. Ventilator had switched to back up mode no spontaneous respirations. Time to death 1330 hrs. May 25th, 2024. Condolences offered to daughter at bedside. all questions answered. She will notify remainder of family Matty ( brother) her second brother  is travelling from [place]  via air. Discharge summary completed      Nael Dey MD

## 2024-05-26 NOTE — DISCHARGE SUMMARY
Discharge Diagnosis  SBO (small bowel obstruction)  Septic Shock  Multi-Organ Faliure   Encephalopathy of sepsis  Respiratory Failure   Renal Failure       Issues Requiring Follow-Up  None    Test Results Pending At Discharge  Pending Labs       Order Current Status    Surgical Pathology Exam In process    Blood Culture Preliminary result    Blood Culture Preliminary result            Hospital Course   HD # 3 called to bedside to evaluate absence of electrical activity and absence of pulses for this 88 Year old female who is POD # 3 s/p Mini-Laparotomy, Small Bowel Resection mid jejunum with primary anastomosis (  Approximately 290 cm of ischemic small bowel was resected) resultant small bowel anastomosis between the proximal and distal limbs ) .     This 88 year old female originally presented with greater than one weeks' worth of abdominal pain, nausea emesis, constipation and distension. After the pain progressed she presented to the Hospital and a CT scan was obtained which revealed a high-grade small bowel obstruction with suggestion of internal hernia. Arterial hypo-enhancement of the dilated small bowel loops and suggestions of early ischemia. She was taken to the OR where 290 cm of ischemic small bowel was found and resected. A primary anastomosis was performed ileocecal valve was intact no colonic resection. Post op she was admitted to the   Arrived here in the ICU post procedure, ventilated on vasopressors. 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      PMH   Anemia of  renal disease          2023  Chronic diastolic congestive heart failure (Multi)       2023  Essential hypertension, benign  NSAID long-term use  Melena    Chronic pain  Dyspnea on exertion  History of low back pain  History of spinal stenosis  Acute bronchitis  Type 2 diabetes mellitus     PSH  Back surgery                10/11/2022  Ct angio coronary art with heart flow if score >30%              2023  Ct heart coronary angiogram 2023 [facility] CT  Hysterectomy              2017  Dental Surgery  Left total knee arthroplasty                 10/11/2022                   SH Nonsmoker non-drinker        Evaluated at bedside no activity (flat line)  on EKG monitor . NO palpable pulses carotid nor detectable heart tomes. Ventilator had switched to back up mode no spontaneous respirations. Time to death 1330 hrs. May 25th, 2024. Condolences offered to daughter at bedside. all questions answered. She will notify remainder of family Matty ( brother) her second brother  is travelling from [place]  via air.  Cause of Death:   Septic Shock with resultant Multi-Organ Faliure   Encephalopathy of sepsis  Respiratory Failure   Renal Failure    Pertinent Physical Exam At Time of Discharge  Physical Exam   aystole, apneic    Home Medications  Not Required     Outpatient Follow-Up  Not Required     Nael Dey MD

## 2024-05-26 NOTE — NURSING NOTE
Patient went to asystole at 1330; Dr. Dey at bedside. Patient got declared dead at this time. Family at bedside available. Moral support given. Dead body provided per protocoals. Patient got transferred to the  home(mentioned in mortality checklist)at 1730.

## 2024-05-26 NOTE — PROGRESS NOTES
Raegan Ramos is a 88 y.o. female on day 3 of admission presenting with SBO (small bowel obstruction) (Multi).    Subjective   HD # 3 for this 88 Year old female who is POD # 2 s/p Mini-Laparotomy, Small Bowel Resection mid jejunum with primary anastomosis (  Approximately 290 cm of ischemic small bowel was resected) resultant small bowel anastomosis between the proximal and distal limbs ) .   This 88 year old female originally presented with greater than one weeks' worth of abdominal pain, nausea emesis, constipation and distension. After the pain progressed she presented to the Hospital and a CT scan was obtained which revealed a high-grade small bowel obstruction with suggestion of internal hernia. Arterial hypo-enhancement of the dilated small bowel loops and suggestions of early ischemia. She was taken to the OR where 290 cm of ischemic small bowel was found and resected. A primary anastomosis was performed ileocecal valve was intact no colonic resection. Post op she was admitted to the [facility] post procedure, ventilated on vasopressors.     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                                            Physical Exam  She remains intubated unresponsive off sedation, remains on vasopressor agents Levo and Vasopressin CVVHD circuit had clotted and poor blood return was obtained after multiple additional measures. Remains ventilated AC VC+ ventilation RR 20. Minute ventilation maintained, symmetric chest expansion not tachycardic ABD post op changes   no  "guarding nor rigidity, post op appropriate tenderness. She remains anuric CVVHD halted, Discussed with daughter at bedside as well as with consultant nephrologist. Mirta her daughter agreed that further attempts to obtain dialysis access would be non-beneficial. The desperate nature of their mother's critical illness was outlined. Multiorgan failure 1) Encephalopathy, 2) Respiratory Failure, 3) Circulatory Failure, 4) Renal Failure, 5) Shock Liver metabolic acidosis was outlined. The negative impact of sequential organ failure on a favorable outcome was also reviewed all questions were answered. A third child son is arriving from his home in [place] this evening. Remains DNAR CCA       Objective   Last Recorded Vitals  Blood pressure (!) 96/41, pulse 68, temperature 37 °C (98.6 °F), temperature source Esophageal, resp. rate (!) 29, height 1.651 m (5' 5\"), weight 85.6 kg (188 lb 11.4 oz), SpO2 90%.  Intake/Output last 3 Shifts:  I/O last 3 completed shifts:  In: 3004.8 (35.1 mL/kg) [I.V.:2654.8 (31 mL/kg); IV Piggyback:350]  Out: 1287 (15 mL/kg) [Urine:64 (0 mL/kg/hr); Emesis/NG output:300; Other:923]  Weight: 85.6 kg LABS:  CMP:  Results from last 7 days   Lab Units 05/26/24  0510 05/26/24  0117 05/25/24  1446 05/25/24  1124 05/25/24  0448 05/24/24  0443 05/23/24 2014 05/23/24  1213 05/23/24  0158   SODIUM mmol/L 137 136  --  138 139 140 140 133* 138   POTASSIUM mmol/L 6.0* 5.7*  --  5.7* 6.8* 5.3 4.6 5.7* 4.1   CHLORIDE mmol/L 105 104  --  106 110* 112* 113* 103 103   CO2 mmol/L 9* 10*  --  11* 9* 16* 19* 20* 22   ANION GAP mmol/L 29* 28*  --  27* 27* 17 13 16 17   BUN mg/dL 60* 59* 76* 72* 68* 43* 36* 30* 25*   CREATININE mg/dL 4.04* 3.90* 4.89* 4.66* 3.91* 2.50* 1.87* 1.56* 1.06*   EGFR mL/min/1.73m*2 10* 11* 8* 9* 11* 18* 26* 32* 51*   MAGNESIUM mg/dL 2.00 2.00  --   --   --   --   --   --  2.20   ALBUMIN g/dL 2.4* 2.7*  --   --  2.7* 2.8* 2.7*  --  4.2   ALT U/L >5,000* >5,000*  --   --  >5,000* 2,941*  " "--   --  7   AST U/L 6,107* 6,936*  --   --  5,873* 2,683*  --   --  17   BILIRUBIN TOTAL mg/dL 2.7* 2.7*  --   --  1.1 0.5  --   --  0.5   LIPASE U/L  --   --   --   --   --   --   --   --  9     CBC:  Results from last 7 days   Lab Units 05/26/24  0117 05/25/24 0448 05/24/24 0443 05/23/24 2014 05/23/24  0158   WBC AUTO x10*3/uL 10.3 12.4* 9.8 13.4* 11.1   HEMOGLOBIN g/dL 8.1* 8.6* 9.6* 10.2* 13.0   HEMATOCRIT % 26.7* 29.4* 30.9* 33.3* 40.3   PLATELETS AUTO x10*3/uL 100* 137* 160 166 217   MCV fL 102* 106* 100 102* 95     COAG:     ABO: No results found for: \"ABO\"  HEME/ENDO:  Results from last 7 days   Lab Units 05/23/24  0811   HEMOGLOBIN A1C % 6.0*      CARDIAC:   Results from last 7 days   Lab Units 05/23/24  0158   TROPHS ng/L 13         This critically ill patient continues to be at-risk for clinically significant deterioration / failure due to the above mentioned dysfunctional, unstable organ systems.  I have personally identified and managed all complex critical care issues to prevent aforementioned clinical deterioration.  Critical care time is spent at bedside and/or the immediate area and has included, but is not limited to, the review of diagnostic tests, labs, radiographs, serial assessments of hemodynamics, respiratory status, ventilatory management, and family updates.  Time spent in procedures and teaching are reported separately.     CRITICAL CARE TIME: 35  minutes    This patient has a central line   Reason for the central line remaining today? Vasoactive agents    This patient has a urinary catheter   Reason for the urinary catheter remaining today? end-of-life care    This patient is intubated   Reason for patient to remain intubated today? they are unable to protect their airway          Assessment/Plan   Multi system organ failure     Nael Dey MD      "

## 2024-05-26 NOTE — SIGNIFICANT EVENT
Counseling regarding advance care directives and goals of care    I had extensive discussion with the patient daughter Mirta who was assigned by the patient's son to make preliminary medical decisions.  [place] of [place] and I updated overall condition of the patient at bedside.  Patient ICU nurse and 2 other family members were in the room at the point of family meeting.  Explained about shock requiring vasopressors, acute kidney injury, shock liver, acute encephalopathy, hyperkalemia and severe metabolic acidosis.  Explained about the small bowel obstruction and removal of the small bowel.  Explained difference between full code versus DNR versus DNR comfort care only. Patient daughter Mirta wants Raegan to be DNR (based on the patient wishes communicated to her in the past) but okay with intubation and all other measures for now while waiting to have a family meeting with the other 2 siblings tomorrow.  As per the patient wishes communicated to the patient daughter in the past, changed CODE STATUS to DNR.     I spent 30 minutes of critical care time.

## 2024-05-27 LAB
BACTERIA BLD CULT: NORMAL
BACTERIA BLD CULT: NORMAL

## 2024-05-28 NOTE — SIGNIFICANT EVENT
"\"Restraint: Death within 24 hours of utilization of soft wrist restraints. Logged on 5-26-24 as per CMS regulations\".  Time of death 1330 on 5-26-24  Soft wrist Restraint removal at 0900 5-26-24  "

## 2024-05-31 ENCOUNTER — APPOINTMENT (OUTPATIENT)
Dept: HEMATOLOGY/ONCOLOGY | Facility: CLINIC | Age: 89
End: 2024-05-31
Payer: MEDICARE

## 2024-05-31 LAB
LABORATORY COMMENT REPORT: NORMAL
PATH REPORT.FINAL DX SPEC: NORMAL
PATH REPORT.GROSS SPEC: NORMAL
PATH REPORT.RELEVANT HX SPEC: NORMAL
PATH REPORT.TOTAL CANCER: NORMAL
RESIDENT REVIEW: NORMAL

## 2024-08-06 ENCOUNTER — APPOINTMENT (OUTPATIENT)
Dept: CARDIOLOGY | Facility: CLINIC | Age: 89
End: 2024-08-06
Payer: MEDICARE

## 2024-08-12 ENCOUNTER — APPOINTMENT (OUTPATIENT)
Dept: CARDIOLOGY | Facility: CLINIC | Age: 89
End: 2024-08-12
Payer: MEDICARE

## 2024-08-13 ENCOUNTER — APPOINTMENT (OUTPATIENT)
Dept: CARDIOLOGY | Facility: CLINIC | Age: 89
End: 2024-08-13
Payer: MEDICARE

## 2024-09-30 ENCOUNTER — APPOINTMENT (OUTPATIENT)
Dept: HEMATOLOGY/ONCOLOGY | Facility: CLINIC | Age: 89
End: 2024-09-30
Payer: MEDICARE

## 2025-01-09 ENCOUNTER — APPOINTMENT (OUTPATIENT)
Dept: AUDIOLOGY | Facility: CLINIC | Age: 89
End: 2025-01-09
Payer: MEDICARE

## (undated) DEVICE — TROCAR SYSTEM, BALLOON, KII GELPORT, 12 X 100MM

## (undated) DEVICE — SHEARS, CURVED 5MM, ENDOPATH

## (undated) DEVICE — ADHESIVE, SKIN, LIQUIBAND EXCEED

## (undated) DEVICE — SHEAR, W/UNIPOLAR CAUTERY, ENDOSHEAR, 5 MM

## (undated) DEVICE — Device

## (undated) DEVICE — PUMP, STRYKERFLOW 2 & HANDPIECE W/10FT. IRRIGATION TUBING

## (undated) DEVICE — GLOVE, SURGICAL, PROTEXIS PI BLUE W/NEUTHERA, 8.0, PF, LF

## (undated) DEVICE — CORD, MONOPOLAR HIGH FREQUENCY, 8MM PLUG, 300CM

## (undated) DEVICE — CUTTER, PROXIMATE LINEAR RELOAD, 75MM, BLUE

## (undated) DEVICE — SUTURE, VICRYL, 4-0, 18 IN, PS2, UNDYED

## (undated) DEVICE — TUBING, SUCTION, NON-CONDUCTIVE, W/CONNECT,.25 IN X 12 FT, STERILE, LF

## (undated) DEVICE — SOLUTION, IRRIGATION, SODIUM CHLORIDE 0.9%, 1000 ML, POUR BOTTLE

## (undated) DEVICE — HANDPIECE, POOLE SUCTION, W/O TUBING

## (undated) DEVICE — ELECTRODE, ELECTROSURGICAL, BLADE EXT 4 INCH, INSULATED

## (undated) DEVICE — SUTURE, SILK, 3-0, 30 IN, SH, CONTROL RELEASE, MULTIPACK, BLACK

## (undated) DEVICE — SYRINGE, 60 CC, IRRIGATION, BULB, CONTRO-BULB, PAPER POUCH

## (undated) DEVICE — RETRACTOR SYSTEM, ALEXIS, MEDIUM

## (undated) DEVICE — STAPLER, LINEAR, 3.5 60MM, RELOADABLE, BLUE

## (undated) DEVICE — MARKER, SKIN, DUAL TIP INK W/9 LABEL AND REMOVABLE TIME OUT SLEEVE

## (undated) DEVICE — SUTURE, VICRYL, 3-0, 27 IN, SH, VIOLET

## (undated) DEVICE — SPONGE, LAP, XRAY DECT, 18IN X 18IN, W/MASTER DMT, STERILE

## (undated) DEVICE — SUTURE, PDS II, 0, 27 IN, CT1, VIOLET

## (undated) DEVICE — CUTTER, PROX LINEAR, 75MM, REG TISSUE, W/ SAFETY LOCK OUT

## (undated) DEVICE — COUNTER, NEEDLE, FOAM BLOCK, POP-N-COUNT, W/BLADEGUARD, W/ADHESIVE 40 COUNT, RED

## (undated) DEVICE — LIGASURE IMPACT, 18CM

## (undated) DEVICE — SYSTEM, TROCAR LAP, 5X100MM, SHIELD BLADED, KII ADVANCED FIX ABDOMINAL

## (undated) DEVICE — SCOPE WARMER, LAPAROSCOPE, BAG ONLY, LF

## (undated) DEVICE — TROCAR, OPTICAL, BLADELESS, KII FIOS, 5 X 100MM, THREADED

## (undated) DEVICE — SUTURE, VICRYL, 0, 27 IN, UR-6, VIOLET

## (undated) DEVICE — TUBE SET, PNEUMOLAR HEATED, SMOKE EVACU, HIGH-FLOW

## (undated) DEVICE — TIP, SUCTION, YANKAUER, BULB, ADULT